# Patient Record
Sex: FEMALE | Race: WHITE | HISPANIC OR LATINO | Employment: OTHER | URBAN - METROPOLITAN AREA
[De-identification: names, ages, dates, MRNs, and addresses within clinical notes are randomized per-mention and may not be internally consistent; named-entity substitution may affect disease eponyms.]

---

## 2019-12-02 ENCOUNTER — OFFICE VISIT CONVERTED (OUTPATIENT)
Dept: FAMILY MEDICINE CLINIC | Facility: CLINIC | Age: 54
End: 2019-12-02
Attending: NURSE PRACTITIONER

## 2019-12-02 ENCOUNTER — HOSPITAL ENCOUNTER (OUTPATIENT)
Dept: LAB | Facility: HOSPITAL | Age: 54
Discharge: HOME | End: 2019-12-02
Attending: NURSE PRACTITIONER

## 2019-12-02 ENCOUNTER — CONVERSION ENCOUNTER (OUTPATIENT)
Dept: FAMILY MEDICINE CLINIC | Facility: CLINIC | Age: 54
End: 2019-12-02

## 2019-12-02 LAB
EST. AVERAGE GLUCOSE BLD GHB EST-MCNC: 128 MG/DL
HBA1C MFR BLD: 6.1 % (ref 3.5–5.7)
VIT B12 SERPL-MCNC: 1150 PG/ML (ref 211–911)

## 2019-12-03 LAB
25(OH)D3 SERPL-MCNC: 43.1 NG/ML (ref 30–100)
ALBUMIN SERPL-MCNC: 4.9 G/DL (ref 3.5–5)
ALBUMIN/GLOB SERPL: 1.6 {RATIO} (ref 1.4–2.6)
ALP SERPL-CCNC: 98 U/L (ref 53–141)
ALT SERPL-CCNC: 27 U/L (ref 10–40)
ANION GAP SERPL CALC-SCNC: 19 MMOL/L (ref 8–19)
AST SERPL-CCNC: 26 U/L (ref 15–50)
BASOPHILS # BLD AUTO: 0.04 10*3/UL (ref 0–0.2)
BASOPHILS NFR BLD AUTO: 0.6 % (ref 0–3)
BILIRUB SERPL-MCNC: 0.33 MG/DL (ref 0.2–1.3)
BUN SERPL-MCNC: 14 MG/DL (ref 5–25)
BUN/CREAT SERPL: 16 {RATIO} (ref 6–20)
CALCIUM SERPL-MCNC: 10.1 MG/DL (ref 8.7–10.4)
CANCER AG125 SERPL-ACNC: 6.2 U/ML (ref 0–38.1)
CHLORIDE SERPL-SCNC: 100 MMOL/L (ref 99–111)
CHOLEST SERPL-MCNC: 191 MG/DL (ref 107–200)
CHOLEST/HDLC SERPL: 2.9 {RATIO} (ref 3–6)
CONV ABS IMM GRAN: 0.01 10*3/UL (ref 0–0.2)
CONV CO2: 26 MMOL/L (ref 22–32)
CONV IMMATURE GRAN: 0.1 % (ref 0–1.8)
CONV TOTAL PROTEIN: 8 G/DL (ref 6.3–8.2)
CREAT UR-MCNC: 0.88 MG/DL (ref 0.5–0.9)
DEPRECATED RDW RBC AUTO: 43.3 FL (ref 36.4–46.3)
EOSINOPHIL # BLD AUTO: 0.27 10*3/UL (ref 0–0.7)
EOSINOPHIL # BLD AUTO: 3.9 % (ref 0–7)
ERYTHROCYTE [DISTWIDTH] IN BLOOD BY AUTOMATED COUNT: 13.1 % (ref 11.7–14.4)
GFR SERPLBLD BASED ON 1.73 SQ M-ARVRAT: >60 ML/MIN/{1.73_M2}
GLOBULIN UR ELPH-MCNC: 3.1 G/DL (ref 2–3.5)
GLUCOSE SERPL-MCNC: 90 MG/DL (ref 65–99)
HCT VFR BLD AUTO: 41.9 % (ref 37–47)
HDLC SERPL-MCNC: 65 MG/DL (ref 40–60)
HGB BLD-MCNC: 13.4 G/DL (ref 12–16)
LDLC SERPL CALC-MCNC: 107 MG/DL (ref 70–100)
LYMPHOCYTES # BLD AUTO: 1.72 10*3/UL (ref 1–5)
LYMPHOCYTES NFR BLD AUTO: 25 % (ref 20–45)
MCH RBC QN AUTO: 28.5 PG (ref 27–31)
MCHC RBC AUTO-ENTMCNC: 32 G/DL (ref 33–37)
MCV RBC AUTO: 89.1 FL (ref 81–99)
MONOCYTES # BLD AUTO: 0.55 10*3/UL (ref 0.2–1.2)
MONOCYTES NFR BLD AUTO: 8 % (ref 3–10)
NEUTROPHILS # BLD AUTO: 4.29 10*3/UL (ref 2–8)
NEUTROPHILS NFR BLD AUTO: 62.4 % (ref 30–85)
NRBC CBCN: 0 % (ref 0–0.7)
OSMOLALITY SERPL CALC.SUM OF ELEC: 292 MOSM/KG (ref 273–304)
PLATELET # BLD AUTO: 331 10*3/UL (ref 130–400)
PMV BLD AUTO: 10.5 FL (ref 9.4–12.3)
POTASSIUM SERPL-SCNC: 3.7 MMOL/L (ref 3.5–5.3)
RBC # BLD AUTO: 4.7 10*6/UL (ref 4.2–5.4)
SODIUM SERPL-SCNC: 141 MMOL/L (ref 135–147)
TRIGL SERPL-MCNC: 93 MG/DL (ref 40–150)
TSH SERPL-ACNC: 4.72 M[IU]/L (ref 0.27–4.2)
VLDLC SERPL-MCNC: 19 MG/DL (ref 5–37)
WBC # BLD AUTO: 6.88 10*3/UL (ref 4.8–10.8)

## 2019-12-04 ENCOUNTER — HOSPITAL ENCOUNTER (OUTPATIENT)
Dept: GENERAL RADIOLOGY | Facility: HOSPITAL | Age: 54
Discharge: HOME | End: 2019-12-04
Attending: NURSE PRACTITIONER

## 2019-12-20 ENCOUNTER — OFFICE VISIT CONVERTED (OUTPATIENT)
Dept: ORTHOPEDIC SURGERY | Facility: CLINIC | Age: 54
End: 2019-12-20
Attending: ORTHOPAEDIC SURGERY

## 2020-01-06 ENCOUNTER — CONVERSION ENCOUNTER (OUTPATIENT)
Dept: FAMILY MEDICINE CLINIC | Facility: CLINIC | Age: 55
End: 2020-01-06

## 2020-01-06 ENCOUNTER — OFFICE VISIT CONVERTED (OUTPATIENT)
Dept: FAMILY MEDICINE CLINIC | Facility: CLINIC | Age: 55
End: 2020-01-06
Attending: NURSE PRACTITIONER

## 2020-01-10 ENCOUNTER — HOSPITAL ENCOUNTER (OUTPATIENT)
Dept: FAMILY MEDICINE CLINIC | Facility: CLINIC | Age: 55
Discharge: HOME | End: 2020-01-10
Attending: NURSE PRACTITIONER

## 2020-01-10 ENCOUNTER — HOSPITAL ENCOUNTER (OUTPATIENT)
Dept: GENERAL RADIOLOGY | Facility: HOSPITAL | Age: 55
Discharge: HOME | End: 2020-01-10
Attending: NURSE PRACTITIONER

## 2020-01-10 ENCOUNTER — OFFICE VISIT CONVERTED (OUTPATIENT)
Dept: FAMILY MEDICINE CLINIC | Facility: CLINIC | Age: 55
End: 2020-01-10
Attending: NURSE PRACTITIONER

## 2020-01-15 LAB
CONV LAST MENSTURAL PERIOD: NORMAL
PATHOLOGIST REVIEW: NORMAL
SPECIMEN SOURCE: NORMAL
SPECIMEN SOURCE: NORMAL
THIN PREP CVX: NORMAL

## 2020-01-20 LAB — HPV HYBRID CAPTURE HIGH RISK: NEGATIVE

## 2020-01-23 ENCOUNTER — HOSPITAL ENCOUNTER (OUTPATIENT)
Dept: PERIOP | Facility: HOSPITAL | Age: 55
Setting detail: HOSPITAL OUTPATIENT SURGERY
Discharge: HOME | End: 2020-01-23
Attending: ORTHOPAEDIC SURGERY

## 2020-02-05 ENCOUNTER — CONVERSION ENCOUNTER (OUTPATIENT)
Dept: ORTHOPEDIC SURGERY | Facility: CLINIC | Age: 55
End: 2020-02-05

## 2020-02-05 ENCOUNTER — OFFICE VISIT CONVERTED (OUTPATIENT)
Dept: ORTHOPEDIC SURGERY | Facility: CLINIC | Age: 55
End: 2020-02-05
Attending: PHYSICIAN ASSISTANT

## 2020-02-06 ENCOUNTER — OFFICE VISIT CONVERTED (OUTPATIENT)
Dept: FAMILY MEDICINE CLINIC | Facility: CLINIC | Age: 55
End: 2020-02-06
Attending: NURSE PRACTITIONER

## 2020-02-06 ENCOUNTER — HOSPITAL ENCOUNTER (OUTPATIENT)
Dept: LAB | Facility: HOSPITAL | Age: 55
Discharge: HOME | End: 2020-02-06
Attending: NURSE PRACTITIONER

## 2020-02-06 ENCOUNTER — CONVERSION ENCOUNTER (OUTPATIENT)
Dept: FAMILY MEDICINE CLINIC | Facility: CLINIC | Age: 55
End: 2020-02-06

## 2020-02-06 ENCOUNTER — HOSPITAL ENCOUNTER (OUTPATIENT)
Dept: GENERAL RADIOLOGY | Facility: HOSPITAL | Age: 55
Discharge: HOME | End: 2020-02-06
Attending: NURSE PRACTITIONER

## 2020-02-06 LAB — TSH SERPL-ACNC: 6.1 M[IU]/L (ref 0.27–4.2)

## 2020-02-27 ENCOUNTER — HOSPITAL ENCOUNTER (OUTPATIENT)
Dept: OTHER | Facility: HOSPITAL | Age: 55
Discharge: HOME | End: 2020-02-27
Attending: OBSTETRICS & GYNECOLOGY

## 2020-03-04 ENCOUNTER — CONVERSION ENCOUNTER (OUTPATIENT)
Dept: ORTHOPEDIC SURGERY | Facility: CLINIC | Age: 55
End: 2020-03-04

## 2020-03-04 ENCOUNTER — OFFICE VISIT CONVERTED (OUTPATIENT)
Dept: ORTHOPEDIC SURGERY | Facility: CLINIC | Age: 55
End: 2020-03-04
Attending: PHYSICIAN ASSISTANT

## 2020-06-19 ENCOUNTER — HOSPITAL ENCOUNTER (OUTPATIENT)
Dept: LAB | Facility: HOSPITAL | Age: 55
Discharge: HOME | End: 2020-06-19
Attending: NURSE PRACTITIONER

## 2020-06-19 ENCOUNTER — OFFICE VISIT CONVERTED (OUTPATIENT)
Dept: FAMILY MEDICINE CLINIC | Facility: CLINIC | Age: 55
End: 2020-06-19
Attending: NURSE PRACTITIONER

## 2020-06-19 ENCOUNTER — CONVERSION ENCOUNTER (OUTPATIENT)
Dept: FAMILY MEDICINE CLINIC | Facility: CLINIC | Age: 55
End: 2020-06-19

## 2020-06-19 LAB — TSH SERPL-ACNC: 2.75 M[IU]/L (ref 0.27–4.2)

## 2020-07-31 ENCOUNTER — OFFICE VISIT CONVERTED (OUTPATIENT)
Dept: FAMILY MEDICINE CLINIC | Facility: CLINIC | Age: 55
End: 2020-07-31
Attending: NURSE PRACTITIONER

## 2020-07-31 ENCOUNTER — CONVERSION ENCOUNTER (OUTPATIENT)
Dept: FAMILY MEDICINE CLINIC | Facility: CLINIC | Age: 55
End: 2020-07-31

## 2020-11-02 ENCOUNTER — OFFICE VISIT CONVERTED (OUTPATIENT)
Dept: FAMILY MEDICINE CLINIC | Facility: CLINIC | Age: 55
End: 2020-11-02
Attending: NURSE PRACTITIONER

## 2020-12-02 ENCOUNTER — HOSPITAL ENCOUNTER (OUTPATIENT)
Dept: MRI IMAGING | Facility: HOSPITAL | Age: 55
Discharge: HOME | End: 2020-12-02
Attending: NURSE PRACTITIONER

## 2021-02-03 ENCOUNTER — OFFICE VISIT CONVERTED (OUTPATIENT)
Dept: FAMILY MEDICINE CLINIC | Facility: CLINIC | Age: 56
End: 2021-02-03
Attending: NURSE PRACTITIONER

## 2021-04-08 ENCOUNTER — HOSPITAL ENCOUNTER (OUTPATIENT)
Dept: VACCINE CLINIC | Facility: HOSPITAL | Age: 56
Discharge: HOME | End: 2021-04-08
Attending: INTERNAL MEDICINE

## 2021-04-08 ENCOUNTER — HOSPITAL ENCOUNTER (OUTPATIENT)
Dept: LAB | Facility: HOSPITAL | Age: 56
Discharge: HOME | End: 2021-04-08
Attending: NURSE PRACTITIONER

## 2021-04-08 LAB
25(OH)D3 SERPL-MCNC: 35.2 NG/ML (ref 30–100)
ALBUMIN SERPL-MCNC: 4.4 G/DL (ref 3.5–5)
ALBUMIN/GLOB SERPL: 1.5 {RATIO} (ref 1.4–2.6)
ALP SERPL-CCNC: 104 U/L (ref 53–141)
ALT SERPL-CCNC: 21 U/L (ref 10–40)
ANION GAP SERPL CALC-SCNC: 14 MMOL/L (ref 8–19)
AST SERPL-CCNC: 19 U/L (ref 15–50)
BASOPHILS # BLD AUTO: 0.05 10*3/UL (ref 0–0.2)
BASOPHILS NFR BLD AUTO: 0.8 % (ref 0–3)
BILIRUB SERPL-MCNC: 0.31 MG/DL (ref 0.2–1.3)
BUN SERPL-MCNC: 16 MG/DL (ref 5–25)
BUN/CREAT SERPL: 18 {RATIO} (ref 6–20)
CALCIUM SERPL-MCNC: 9.3 MG/DL (ref 8.7–10.4)
CHLORIDE SERPL-SCNC: 105 MMOL/L (ref 99–111)
CHOLEST SERPL-MCNC: 202 MG/DL (ref 107–200)
CHOLEST/HDLC SERPL: 2.7 {RATIO} (ref 3–6)
CONV ABS IMM GRAN: 0 10*3/UL (ref 0–0.2)
CONV CO2: 27 MMOL/L (ref 22–32)
CONV IMMATURE GRAN: 0 % (ref 0–1.8)
CONV TOTAL PROTEIN: 7.3 G/DL (ref 6.3–8.2)
CREAT UR-MCNC: 0.91 MG/DL (ref 0.5–0.9)
DEPRECATED RDW RBC AUTO: 42.8 FL (ref 36.4–46.3)
EOSINOPHIL # BLD AUTO: 0.43 10*3/UL (ref 0–0.7)
EOSINOPHIL # BLD AUTO: 6.8 % (ref 0–7)
ERYTHROCYTE [DISTWIDTH] IN BLOOD BY AUTOMATED COUNT: 13.2 % (ref 11.7–14.4)
GFR SERPLBLD BASED ON 1.73 SQ M-ARVRAT: >60 ML/MIN/{1.73_M2}
GLOBULIN UR ELPH-MCNC: 2.9 G/DL (ref 2–3.5)
GLUCOSE SERPL-MCNC: 102 MG/DL (ref 65–99)
HCT VFR BLD AUTO: 42.9 % (ref 37–47)
HDLC SERPL-MCNC: 75 MG/DL (ref 40–60)
HGB BLD-MCNC: 13.6 G/DL (ref 12–16)
LDLC SERPL CALC-MCNC: 109 MG/DL (ref 70–100)
LYMPHOCYTES # BLD AUTO: 1.41 10*3/UL (ref 1–5)
LYMPHOCYTES NFR BLD AUTO: 22.2 % (ref 20–45)
MCH RBC QN AUTO: 27.7 PG (ref 27–31)
MCHC RBC AUTO-ENTMCNC: 31.7 G/DL (ref 33–37)
MCV RBC AUTO: 87.4 FL (ref 81–99)
MONOCYTES # BLD AUTO: 0.35 10*3/UL (ref 0.2–1.2)
MONOCYTES NFR BLD AUTO: 5.5 % (ref 3–10)
NEUTROPHILS # BLD AUTO: 4.1 10*3/UL (ref 2–8)
NEUTROPHILS NFR BLD AUTO: 64.7 % (ref 30–85)
NRBC CBCN: 0 % (ref 0–0.7)
OSMOLALITY SERPL CALC.SUM OF ELEC: 295 MOSM/KG (ref 273–304)
PLATELET # BLD AUTO: 335 10*3/UL (ref 130–400)
PMV BLD AUTO: 10.2 FL (ref 9.4–12.3)
POTASSIUM SERPL-SCNC: 4 MMOL/L (ref 3.5–5.3)
RBC # BLD AUTO: 4.91 10*6/UL (ref 4.2–5.4)
SODIUM SERPL-SCNC: 142 MMOL/L (ref 135–147)
TRIGL SERPL-MCNC: 88 MG/DL (ref 40–150)
TSH SERPL-ACNC: 5.55 M[IU]/L (ref 0.27–4.2)
VIT B12 SERPL-MCNC: 1282 PG/ML (ref 211–911)
VLDLC SERPL-MCNC: 18 MG/DL (ref 5–37)
WBC # BLD AUTO: 6.34 10*3/UL (ref 4.8–10.8)

## 2021-04-29 ENCOUNTER — HOSPITAL ENCOUNTER (OUTPATIENT)
Dept: VACCINE CLINIC | Facility: HOSPITAL | Age: 56
Discharge: HOME | End: 2021-04-29
Attending: INTERNAL MEDICINE

## 2021-05-13 NOTE — PROGRESS NOTES
Progress Note      Patient Name: Dorina Basilio   Patient ID: [id number]   Sex: Female   YOB: 1965    Primary Care Provider: Rebecca SANDERS   Referring Provider: Rebecca SANDERS    Visit Date: July 31, 2020    Provider: MARILYN Santillan   Location: [facility]   Location Address: [address] [place] [address], [address]  [place], [place]  [id number]   Location Phone: [phone]          Chief Complaint  · 1 MONTH F/U      History Of Present Illness  Dorina Basilio is a 55 year old /White,  or  female who presents for evaluation and treatment of:      Patient is here today for 1 month follow up on migraine and depression.    Says Aimovig have been working for her migraines. She tried Ubrelvy and it didn't work-she thought the first time it did work but the next time it did not work. Admits throbbing in the left temporal upon waking. Admits light sensitivity and nausea. She is down to one cup of coffee.    depression-reports her mood is stable. Says Trintellix make her itchy but it works and she trying to see if itching will go away.    allergies-she has been off her allegra for awhile. She is taking otc Flonase and Claritin as needed.    reports she is going to start exercising and would like to go back to work.    [id number] at [facility]    hx of kidney stone 2008-she was given a strainer but never saw any stone. States the pain dwindled away. Admits the pain is back. Admits pain radiated on the left side. States she has been drinking cranberry juice for the last couple months. Denies urinary urgency or burning. Admits freq but admits she drinks a lot of fluid.       Past Medical History  Disease Name Date Onset Notes   Bladder Disorder --  --    Bone Density Screening NEVER --    Pap smear for cervical cancer screening 11/2018 NORMAL   Reflux --  --    Screening Mammogram 12/2018 NORMAL   Seasonal allergies --  --    Thyroid disorder --  --          Past  Surgical History  Procedure Name Date Notes   Ablation 2008 --    Carpal tunnel surgery 2016 --    Chromoendoscopy of upper or lower gastrointestinal tract 1992 --    Colonoscopy 2016 NORMAL   Cyst Removal 1979 throat   Gallbladder --  --    Gallbladder removal 2003 --    Ganglion cyst excision 2013 --    Tonsilectomy 1969 --    Tonsillectomy --  --    Tooth extraction, surgical 1978 --    Trigger Finger 2016 --    Tubal ligation 2004 --    Tuboplasty 1995 --    umbilical hernia repair 2008 --    Vulva surgery 1997 --          Medication List  Name Date Started Instructions   Aimovig Autoinjector 140 mg/mL subcutaneous auto-injector 07/31/2020 inject 140 mg by subcutaneous route once a month in the abdomen, thigh, or outer area of upper arm for 90 days   azelastine 0.05 % ophthalmic (eye) drops 06/19/2020 instill 1 drop into affected eye(s) by ophthalmic route 2 times per day   clindamycin phosphate/benzoyl perox 1.2 %/5% 06/19/2020 apply clindamycin phosphate/benzoyl peroxide bid   folic acid 400 mcg oral tablet 12/09/2019 take 1 tablet (0.4 mg) by oral route once daily   omeprazole 20 mg oral capsule,delayed release(DR/EC) 07/28/2020 TAKE 1 CAPSULE(20 MG) BY MOUTH EVERY DAY BEFORE A MEAL   Trintellix 10 mg oral tablet 07/21/2020 TAKE 1 TABLET(10 MG) BY MOUTH AT THE SAME TIME EVERY DAY         Allergy List  Allergen Name Date Reaction Notes   Feldene --  --  --          Family Medical History  Disease Name Relative/Age Notes   Stomach Neoplasm, Malignant Uncle/   --    Cancer, Unspecified Mother/   Mother   Diabetes, unspecified type Brother/  Mother/  Sister/   Mother; Sister; Brother   Ovarian Cancer, Family History Mother/   --    Diabetes Aunt/  Brother/  Grandfather (maternal)/  Grandmother (maternal)/  Mother/  Sister/  Uncle/   --    Osteoporosis Mother/   Mother   Family history of Arthritis Brother/  Mother/   Mother; Brother         Social History  Finding Status Start/Stop Quantity Notes   Alcohol Use --  " --/-- --  07/31/2020 - 06/19/2020 - 02/06/2020 - 01/10/2020 - rarely drinks, less than 1 drink per day   lives with spouse --  --/-- --  --     --  --/-- --  --    Recreational Drug Use Never --/-- --  no   Retired. --  --/-- --  --    Tobacco Never --/-- --  never smoker         Immunizations  NameDate Admin Mfg Trade Name Lot Number Route Inj VIS Given VIS Publication   Hepatitis A09/14/1998 NE Not Entered  NE NE 12/02/2019    Comments:    Hepatitis A03/12/1998 NE Not Entered  NE NE 12/02/2019    Comments:    Fbjhvkkdp15/02/2019 [facility] Fluzone Quadrivalent [id number] IM RD 12/02/2019    Comments: PATIENT TOLERATED WELL.   Hpnacpdo73/14/2014 NE Not Entered  NE NE 12/02/2019    Comments:    Tdap12/02/2019 [facility] BOOSTRIX [id number] IM LD 12/02/2019    Comments: PATIENT TOLERATED WELL.         Review of Systems  · Constitutional  o Denies  o : fever, fatigue, weight loss, weight gain  · HENT  o Admits  o : headaches  · Cardiovascular  o Denies  o : lower extremity edema, claudication, chest pressure, palpitations  · Respiratory  o Denies  o : shortness of breath, wheezing, cough, hemoptysis, dyspnea on exertion  · Gastrointestinal  o Denies  o : nausea, vomiting, diarrhea, constipation, abdominal pain  · Genitourinary  o Admits  o : frequency  o Denies  o : urgency, dysuria  · Musculoskeletal  o Admits  o : back pain      Vitals  Date Time BP Position Site L\R Cuff Size HR RR TEMP (F) WT  HT  BMI kg/m2 BSA m2 O2 Sat        07/31/2020 02:19 /69 Sitting    70 - R   188lbs 0oz 5'  2\" 34.39 1.93 98 %          Physical Examination  · Constitutional  o Appearance  o : alert, in no acute distress, well developed, well-nourished  · Neck  o Thyroid  o : no thyomegaly, no palpabale masses   · Respiratory  o Auscultation of Lungs  o : normal breath sounds throughout, no wheeze, rhonchi, or crackles  · Cardiovascular  o Heart  o : Regular rate and rhythm, Normal S1,S2 , No cardiac murmers, No S3 or S4 gallop or rubs  · Skin " and Subcutaneous Tissue  o General Inspection  o : no rashes, normal skin color, warm and dry  o Digits and Nails  o : no clubbing, cyanosis, deformities or edema present, normal appearing nails  · Neurologic  o Mental Status Examination  o : alert and oriented to time, place, and person. Gait and Station: normal gait, able to stand without difficulty  · Psychiatric  o Judgement and Insight  o : judgment and insight intact  o Mood and Affect  o : normal mood and affect  · Back  o Inspection  o : no gross deformities  o Palpation  o : no tenderness to palpation, no swelling  o Range of Motion  o : normal range of motion  o Reflexes  o : normal reflexes  o Gait  o : normal gait  o Special Tests  o : negative straight leg raise              Assessment  · Depression     311/F32.9  mood is stable on Trintellix. Complaint of side effect of pruritus but report is manageable and wishes to continue taking. Refill today.  · Migraines     346.90/G43.909  Admits decreased migraine since starting Aimovig. Admits throbbing in the left temporal upon waking. Admits light sensitivity and nausea. She has decreased her caffeine intake. Prescribed Nurtec for abortive therapy.   · Trigger finger, right     727.03/M65.30  right middle finger-referred to ortho.  · Seasonal allergies     477.9/J30.2  taking otc Flonase and Claritin as needed.  · History of kidney stones     V13.01/Z87.442  hx of kidney stone 2008-states the pain resolved on it own-she never passes a stone. Th e same symptoms have returned. Admits left sided radiating pain to the groin. States she has been drinking cranberry juice for the last couple months. Denies urinary urgency or burning. Admits freq but unchanged. Ordered U/A today.      Plan  · Orders  o ACO-39: Current medications updated and reviewed () - - 07/31/2020  o ACO-14: Influenza immunization administered or previously received () - - 07/31/2020  o ORTHOPEDIC CONSULTATION (ORTHO) - 727.03/M65.30 -  07/31/2020  · Medications  o Nurtec ODT 75 mg oral tablet,disintegrating   SIG: take 1 tablet (75 mg) and place on top of tongue, allow to dissolve then swallow by oral route once as needed for migraine; max 1 dose/24 hrs   DISP: (1) Box with 0 refills  Prescribed on 08/09/2020     o Aimovig Autoinjector 140 mg/mL subcutaneous auto-injector   SIG: inject 140 mg by subcutaneous route once a month in the abdomen, thigh, or outer area of upper arm for 90 days   DISP: (3) Auto-injectors with 2 refills  Adjusted on 07/31/2020     o Medications have been Reconciled  o Transition of Care or Provider Policy  · Instructions  o Take all medications as prescribed/directed.  o Patient was educated/instructed on their diagnosis, treatment and medications prior to discharge from the clinic today.  o Patient instructed to seek medical attention urgently for new or worsening symptoms.  o Call the office with any concerns or questions.  o Discussed Covid-19 precautions including, but not limited to, social distancing, avoid touching your face, and hand washing.   o 3 month follow up  · Disposition  o Call or Return if symptoms worsen or persist.            Electronically Signed by: MARILYN Santillan -Author on August 17, 2020 12:17:20 AM

## 2021-05-13 NOTE — PROGRESS NOTES
Progress Note      Patient Name: Dorina Basilio   Patient ID: [id number]   Sex: Female   YOB: 1965    Primary Care Provider: Rebecca SANDERS   Referring Provider: Rebecca SANDERS    Visit Date: November 2, 2020    Provider: MARILYN Santillan   Location: [facility]   Location Address: [address] [place] [address], [address]  [place], [place]  [id number]   Location Phone: [phone]          Chief Complaint  · 3 MONTHS F/U      History Of Present Illness  Dorina Basilio is a 55 year old /White,  or  female who presents for evaluation and treatment of:      Patient is here today for 3 months follow up.    Says she still has migraines every day. Wake up with the headache on one side of head and sometimes it is go away and most times it don't. Reports the pain is on the left side. Denies blurred vision normally but yesterday she did have light sensitivity, n/v and blurred vision yesterday w/migraine. She cut back her caffeine and is watching sodium, msg She took her aimovig on Halloween. Admits since starting the Aimovig her migraines are not as intense. She has had them for over 20 yrs-her  quit smoking which has helped because the cigarette smoke was a trigger. States the Nurtec has helped since starting-she states it worked within 2 hours.    depression-reports doing better on Trintellix-c/o itching initially but it has resolved.            Past Medical History  Disease Name Date Onset Notes   Bladder Disorder --  --    Bone Density Screening NEVER --    Pap smear for cervical cancer screening 01/2020 NORMAL   Reflux --  --    Screening Mammogram 01/2020 NORMAL   Seasonal allergies --  --    Thyroid disorder --  --          Past Surgical History  Procedure Name Date Notes   Ablation 2008 --    Carpal tunnel surgery 2016 --    Chromoendoscopy of upper or lower gastrointestinal tract 1992 --    Colonoscopy 2016 NORMAL   Cyst Removal 1979 throat    Gallbladder --  --    Gallbladder removal 2003 --    Ganglion cyst excision 2013 --    Tonsilectomy 1969 --    Tonsillectomy --  --    Tooth extraction, surgical 1978 --    Trigger Finger 2016 --    Tubal ligation 2004 --    Tuboplasty 1995 --    umbilical hernia repair 2008 --    Vulva surgery 1997 --          Medication List  Name Date Started Instructions   Aimovig Autoinjector 140 mg/mL subcutaneous auto-injector 11/02/2020 INJECT 140 MG UNDER THE SKIN ONCE A MONTH IN THE ABDOMEN, THIGH OR OUTER AREA OF UPPER ARM   azelastine 0.05 % ophthalmic (eye) drops 10/05/2020 INSTILL 1 DROP IN AFFECTED EYE(S) TWICE DAILY   clindamycin phosphate/benzoyl perox 1.2 %/5% 06/19/2020 apply clindamycin phosphate/benzoyl peroxide bid   folic acid 400 mcg oral tablet 12/09/2019 take 1 tablet (0.4 mg) by oral route once daily   Nurtec ODT 75 mg oral tablet,disintegrating 11/02/2020 take 1 tab(75 mg) on top of tongue, allow to dissolve then swallow PO once as needed for migraine; max 1 dose/24 hrs   omeprazole 20 mg oral capsule,delayed release(DR/EC) 07/28/2020 TAKE 1 CAPSULE(20 MG) BY MOUTH EVERY DAY BEFORE A MEAL   Trintellix 10 mg oral tablet 10/05/2020 TAKE 1 TABLET(10 MG) BY MOUTH AT THE SAME TIME EVERY DAY         Allergy List  Allergen Name Date Reaction Notes   Feldene --  --  --        Allergies Reconciled  Family Medical History  Disease Name Relative/Age Notes   Stomach Neoplasm, Malignant Uncle/   --    Cancer, Unspecified Mother/   Mother   Diabetes, unspecified type Brother/  Mother/  Sister/   Mother; Sister; Brother   Ovarian Cancer, Family History Mother/   --    Diabetes Aunt/  Brother/  Grandfather (maternal)/  Grandmother (maternal)/  Mother/  Sister/  Uncle/   --    Osteoporosis Mother/   Mother   Family history of Arthritis Brother/  Mother/   Mother; Brother         Social History  Finding Status Start/Stop Quantity Notes   Alcohol Use --  --/-- --  11/05/2020 - 11/02/2020 - 07/31/2020 - 06/19/2020 -  "02/06/2020 - 01/10/2020 - rarely drinks, less than 1 drink per day   lives with spouse --  --/-- --  --     --  --/-- --  --    Recreational Drug Use Never --/-- --  no   Retired. --  --/-- --  --    Tobacco Never --/-- --  never smoker         Immunizations  NameDate Admin Mfg Trade Name Lot Number Route Inj VIS Given VIS Publication   Hepatitis A09/14/1998 NE Not Entered  NE NE 12/02/2019    Comments:    Hepatitis A03/12/1998 NE Not Entered  NE NE 12/02/2019    Comments:    Jppddmgeb34/23/2020 [facility] Fluzone Quadrivalent [id number] IM LD 10/23/2020 08/15/2019   Comments: PATIENT TOLERATED WELL   Lowodwnr35/14/2014 NE Not Entered  NE NE 12/02/2019    Comments:    Tdap12/02/2019 [facility] BOOSTRIX [id number] IM LD 12/02/2019    Comments: PATIENT TOLERATED WELL.         Review of Systems  · Constitutional  o Denies  o : fever, fatigue, weight loss, weight gain  · HENT  o Admits  o : headaches  · Cardiovascular  o Denies  o : lower extremity edema, claudication, chest pressure, palpitations  · Respiratory  o Denies  o : shortness of breath, wheezing, cough, hemoptysis, dyspnea on exertion  · Gastrointestinal  o Denies  o : nausea, vomiting, diarrhea, constipation, abdominal pain  · Psychiatric  o Admits  o : depression      Vitals  Date Time BP Position Site L\R Cuff Size HR RR TEMP (F) WT  HT  BMI kg/m2 BSA m2 O2 Sat FR L/min FiO2        11/02/2020 01:52 /59 Sitting    72 - R   186lbs 4oz 5'  2\" 34.07 1.92 98 %            Physical Examination  · Constitutional  o Appearance  o : alert, in no acute distress, well developed, well-nourished  · Neck  o Thyroid  o : no thyomegaly, no palpabale masses   · Respiratory  o Auscultation of Lungs  o : normal breath sounds throughout, no wheeze, rhonchi, or crackles  · Cardiovascular  o Heart  o : Regular rate and rhythm, Normal S1,S2 , No cardiac murmers, No S3 or S4 gallop or rubs  · Skin and Subcutaneous Tissue  o General Inspection  o : no rashes, normal skin color, warm and " dry  o Digits and Nails  o : no clubbing, cyanosis, deformities or edema present, normal appearing nails  · Neurologic  o Mental Status Examination  o : alert and oriented to time, place, and person. Gait and Station: normal gait, able to stand without difficulty  · Psychiatric  o Judgement and Insight  o : judgment and insight intact  o Mood and Affect  o : normal mood and affect          Assessment  · Depression     311/F32.9  · Migraines     346.90/G43.909  c/o daily migraines-uncontrolled on current regimen. She request MRI brain-ordered today      Plan  · Orders  o ACO-39: Current medications updated and reviewed (1159F, ) - - 11/02/2020  o ACO-14: Influenza immunization administered or previously received [facility] () - - 11/02/2020  o MRI brain w/ contrast (30063) - - 11/02/2020  · Medications  o Flonase Allergy Relief 50 mcg/actuation nasal spray,suspension   SIG: spray 1 - 2 sprays (50 - 100 mcg) in each nostril by intranasal route once daily as needed for 90 days   DISP: (3) Puff with 2 refills  Prescribed on 11/02/2020     o Aimovig Autoinjector 140 mg/mL subcutaneous auto-injector   SIG: INJECT 140 MG UNDER THE SKIN ONCE A MONTH IN THE ABDOMEN, THIGH OR OUTER AREA OF UPPER ARM   DISP: (4) Pen Needle with 2 refills  Adjusted on 11/02/2020     o Nurtec ODT 75 mg oral tablet,disintegrating   SIG: take 1 tab(75 mg) on top of tongue, allow to dissolve then swallow PO once as needed for migraine; max 1 dose/24 hrs   DISP: (30) Box with 0 refills  Adjusted on 11/02/2020     o Medications have been Reconciled  o Transition of Care or Provider Policy  · Instructions  o Take all medications as prescribed/directed.  o Patient was educated/instructed on their diagnosis, treatment and medications prior to discharge from the clinic today.  o Patient instructed to seek medical attention urgently for new or worsening symptoms.  o Call the office with any concerns or questions.  o 3 month follow up  o Electronically  Identified Patient Education Materials Provided Electronically  · Disposition  o Call or Return if symptoms worsen or persist.            Electronically Signed by: MARILYN Santillan -Author on November 8, 2020 04:51:38 PM

## 2021-05-13 NOTE — PROGRESS NOTES
Progress Note      Patient Name: Dorina Basilio   Patient ID: [id number]   Sex: Female   YOB: 1965    Primary Care Provider: Rebecca SANDERS   Referring Provider: Rebecca SANDERS    Visit Date: June 19, 2020    Provider: MARILYN Santillan   Location: [facility]   Location Address: [address] [place] [address], [address]  [place], [place]  [id number]   Location Phone: [phone]          Chief Complaint  · MEDICATION F/U      History Of Present Illness  Dorina Basilio is a 54 year old /White,  or  female who presents for evaluation and treatment of:      Patient is here today for medication follow up.    Stopped taking all her medications due to COVID19. Would like to discuss headaches, her GERD, and would like to get referral to Dr. López for trigger R middle finger.    migraines-reports she is still having daily migraines. She is taking aimovig 70mg she would like to try the higher dose.    GERD-she has noticed an increase in GERD symptoms-admits she is getting burning in her throat. Admits to taking tums freq.    trigger finger right middle finger. She has had surgery on the 4th digit for trigger finger and carpal tunnel release. She wears a finger splint, warms under water, and massage but it is not helping. She would like a referral to .    depression-reports she tried Effexor but it did not help her symptoms.    hypothyroid-reports she did not do her last tsh-she has not been taking her Synthroid.       Past Medical History  Disease Name Date Onset Notes   Bladder Disorder --  --    Bone Density Screening NEVER --    Pap smear for cervical cancer screening 11/2018 NORMAL   Reflux --  --    Screening Mammogram 12/2018 NORMAL   Seasonal allergies --  --    Thyroid disorder --  --          Past Surgical History  Procedure Name Date Notes   Ablation 2008 --    Carpal tunnel surgery 2016 --    Chromoendoscopy of upper or lower gastrointestinal tract 1992 --     Colonoscopy 2016 NORMAL   Cyst Removal 1979 throat   Gallbladder --  --    Gallbladder removal 2003 --    Ganglion cyst excision 2013 --    Tonsilectomy 1969 --    Tonsillectomy --  --    Tooth extraction, surgical 1978 --    Trigger Finger 2016 --    Tubal ligation 2004 --    Tuboplasty 1995 --    umbilical hernia repair 2008 --    Vulva surgery 1997 --          Medication List  Name Date Started Instructions   azelastine 0.05 % ophthalmic (eye) drops 06/19/2020 instill 1 drop into affected eye(s) by ophthalmic route 2 times per day   Effexor XR 75 mg oral capsule,extended release 24hr 03/30/2020 take 1 capsule (75 mg) by oral route once daily with food for 90 days   folic acid 400 mcg oral tablet 12/09/2019 take 1 tablet (0.4 mg) by oral route once daily         Allergy List  Allergen Name Date Reaction Notes   Feldene --  --  --        Allergies Reconciled  Family Medical History  Disease Name Relative/Age Notes   Stomach Neoplasm, Malignant Uncle/   --    Cancer, Unspecified Mother/   Mother   Diabetes, unspecified type Brother/  Mother/  Sister/   Mother; Sister; Brother   Ovarian Cancer, Family History Mother/   --    Diabetes Aunt/  Brother/  Grandfather (maternal)/  Grandmother (maternal)/  Mother/  Sister/  Uncle/   --    Osteoporosis Mother/   Mother   Family history of Arthritis Brother/  Mother/   Mother; Brother         Social History  Finding Status Start/Stop Quantity Notes   Alcohol Use --  --/-- --  06/19/2020 - 02/06/2020 - 01/10/2020 - rarely drinks, less than 1 drink per day   lives with spouse --  --/-- --  --     --  --/-- --  --    Recreational Drug Use Never --/-- --  no   Retired. --  --/-- --  --    Tobacco Never --/-- --  never smoker         Immunizations  NameDate Admin Mfg Trade Name Lot Number Route Inj VIS Given VIS Publication   Hepatitis A09/14/1998 NE Not Entered  NE NE 12/02/2019    Comments:    Hepatitis A03/12/1998 NE Not Entered  NE NE 12/02/2019    Comments:     Hqrimvdbr67/02/2019 [facility] Fluzone Quadrivalent [id number] IM RD 12/02/2019    Comments: PATIENT TOLERATED WELL.   Xnikeiqo07/14/2014 NE Not Entered  NE NE 12/02/2019    Comments:    Tdap12/02/2019 [facility] BOOSTRIX [id number] IM LD 12/02/2019    Comments: PATIENT TOLERATED WELL.         Review of Systems  · Constitutional  o Denies  o : fever, fatigue, weight loss, weight gain  · Eyes  o Admits  o : discharge from eye, itchy, watery  · HENT  o Admits  o : headaches  · Cardiovascular  o Denies  o : lower extremity edema, claudication, chest pressure, palpitations  · Respiratory  o Denies  o : shortness of breath, wheezing, cough, hemoptysis, dyspnea on exertion  · Gastrointestinal  o Admits  o : GERD  o Denies  o : nausea, vomiting, diarrhea, constipation, abdominal pain  · Musculoskeletal  o Admits  o : limitation of motion, right 3rd digit trigger finger  · Psychiatric  o Admits  o : depression      Vitals  Date Time BP Position Site L\R Cuff Size HR RR TEMP (F) WT  HT  BMI kg/m2 BSA m2 O2 Sat        06/19/2020 02:09 /64 Sitting    69 - R   188lbs 0oz    98 %          Physical Examination  · Constitutional  o Appearance  o : alert, in no acute distress, well developed, well-nourished  · Neck  o Thyroid  o : no thyomegaly, no palpabale masses   · Respiratory  o Auscultation of Lungs  o : normal breath sounds throughout, no wheeze, rhonchi, or crackles  · Cardiovascular  o Heart  o : Regular rate and rhythm, Normal S1,S2 , No cardiac murmers, No S3 or S4 gallop or rubs  · Skin and Subcutaneous Tissue  o General Inspection  o : no rashes, normal skin color, warm and dry  o Digits and Nails  o : no clubbing, cyanosis, deformities or edema present, normal appearing nails  · Neurologic  o Mental Status Examination  o : alert and oriented to time, place, and person. Gait and Station: normal gait, able to stand without difficulty  · Psychiatric  o Judgement and Insight  o : judgment and insight intact  o Mood and Affect  o :  normal mood and affect          Assessment  · Allergic rhinitis due to allergen     477.9/J30.9  c/o eyes itching and watering due to allergies-prescribed Pataday.  · Depression     311/F32.9  tried and failed Effexor-prescribed Trintellix 10mg qd.  · GERD (gastroesophageal reflux disease)     530.81/K21.9  GERD symptoms increased-Admits to taking tums freq. Ordered omeprazole 20mg qd.  · Hypothyroidism     244.9/E03.9  reports she did get her lab to check her tsh-she has not been taking her Synthroid. Ordered tsh today.  · Trigger finger of right hand     727.03/M65.30  c/o trigger finger right middle finger. Hx of trigger finger release 4th digit and carpal tunnel release. She wears a finger splint, warms under water, and massage but it is not helping. Referral placed for .  · Migraines     346.90/G43.909  migraines uncontrolled on current dose of Aimovig-increased to 140mg sub q monthly.      Plan  · Orders  o TSH [facility] ([address]47) - 244.9/E03.9 - 06/19/2020  o ACO-39: Current medications updated and reviewed () - - 06/19/2020  o ACO-14: Influenza immunization administered or previously received () - - 06/19/2020  o ORTHOPEDIC CONSULTATION (ORTHO) - 727.03/M65.30 - 06/19/2020  · Medications  o Pataday 0.2 % ophthalmic (eye) drops   SIG: instill 1 drop into affected eye(s) by ophthalmic route once daily   DISP: (1) 2.5 ml drop btl with 0 refills  Prescribed on 06/19/2020     o Aimovig Autoinjector 140 mg/mL subcutaneous auto-injector   SIG: inject 140 mg by subcutaneous route once a month in the abdomen, thigh, or outer area of upper arm   DISP: (1) Auto-injector with 0 refills  Prescribed on 06/19/2020     o omeprazole 20 mg oral capsule,delayed release(DR/EC)   SIG: take 1 capsule (20 mg) by oral route once daily before a meal   DISP: (30) capsules with 0 refills  Prescribed on 06/19/2020     o Trintellix 10 mg oral tablet   SIG: take 1 tablet (10 mg) by oral route once daily at the same time each day    DISP: (30) tablets with 0 refills  Prescribed on 06/19/2020     o clindamycin phosphate/benzoyl perox 1.2 %/5%   SIG: apply clindamycin phosphate/benzoyl peroxide bid   DISP: (1) 45g with 0 refills  Prescribed on 06/19/2020     o Aimovig Autoinjector 70 mg/mL subcutaneous auto-injector   SIG: inject 1 milliliter (70 mg) by subcutaneous route once a month in the abdomen, thigh, or outer area of upper arm   DISP: (1) Auto-injector with 0 refills  Discontinued on 06/19/2020     o Pataday 0.2 % ophthalmic (eye) drops   SIG: instill 1 drop into affected eye(s) by ophthalmic route once daily   DISP: (3) 2.5 ml drop btl with 1 refills  Discontinued on 06/19/2020     o Medications have been Reconciled  o Transition of Care or Provider Policy  · Instructions  o Maintain a healthy weight. Avoid tight fitting clothes. Avoid fried, fatty foods, tomato sauce, chocolate, mint, garlic, onion, alcohol. caffeine. Eat smaller meals, dont lie down after a meal, dont smoke. Elevate the head of your bed 6-9 inches.  o Take all medications as prescribed/directed.  o Patient was educated/instructed on their diagnosis, treatment and medications prior to discharge from the clinic today.  o Patient instructed to seek medical attention urgently for new or worsening symptoms.  o Call the office with any concerns or questions.  o 1 month follow up  · Disposition  o Call or Return if symptoms worsen or persist.            Electronically Signed by: MARILYN Santillan -Author on June 23, 2020 10:11:00 PM

## 2021-05-14 VITALS
SYSTOLIC BLOOD PRESSURE: 128 MMHG | HEART RATE: 78 BPM | DIASTOLIC BLOOD PRESSURE: 79 MMHG | WEIGHT: 190 LBS | HEIGHT: 62 IN | BODY MASS INDEX: 34.96 KG/M2 | OXYGEN SATURATION: 97 %

## 2021-05-14 VITALS
DIASTOLIC BLOOD PRESSURE: 59 MMHG | HEART RATE: 72 BPM | WEIGHT: 186.25 LBS | OXYGEN SATURATION: 98 % | SYSTOLIC BLOOD PRESSURE: 138 MMHG | BODY MASS INDEX: 34.27 KG/M2 | HEIGHT: 62 IN

## 2021-05-14 NOTE — PROGRESS NOTES
Progress Note      Patient Name: Dorina Basilio   Patient ID: [id number]   Sex: Female   YOB: 1965    Primary Care Provider: Rebecca SANDERS   Referring Provider: Rebecca SANDERS    Visit Date: February 3, 2021    Provider: MARILYN Santillan   Location: [facility]   Location Address: [address] [place] [address], [address]  [place], [place]  [id number]   Location Phone: [phone]          Chief Complaint  · 3 MONTHS F/U      History Of Present Illness  Dorina Basilio is a 55 year old /White,  or  female who presents for evaluation and treatment of:      Patient is here today for 3 months follow up.    Says she doing good would like RX for Claritin for her allergies says it works the best for her itchy eyes-prescribed.     she had an abnormal pap here referred to -she performed an endometrial bx-States she has follow up with Dr. Steinberg tomorrow since she had biopsy 6 months ago it was normal.    Would like  referral to Dermatology for facial acne-referral placed.     Would like to schedule mammogram-scheduled today.    depression-states Trintellix is working well for her depression symptoms.    migraines-stable w/Aimovig.       Past Medical History  Disease Name Date Onset Notes   Bladder Disorder --  --    Bone Density Screening NEVER --    Pap smear for cervical cancer screening 01/2020 NORMAL   Reflux --  --    Screening Mammogram 01/2020 NORMAL   Seasonal allergies --  --    Thyroid disorder --  --          Past Surgical History  Procedure Name Date Notes   Ablation 2008 --    Carpal tunnel surgery 2016 --    Chromoendoscopy of upper or lower gastrointestinal tract 1992 --    Colonoscopy 2016 NORMAL   Cyst Removal 1979 throat   Gallbladder --  --    Gallbladder removal 2003 --    Ganglion cyst excision 2013 --    Tonsilectomy 1969 --    Tonsillectomy --  --    Tooth extraction, surgical 1978 --    Trigger Finger 2016 --    Tubal ligation  2004 --    Tuboplasty 1995 --    umbilical hernia repair 2008 --    Vulva surgery 1997 --          Medication List  Name Date Started Instructions   Aimovig Autoinjector 140 mg/mL subcutaneous auto-injector 11/02/2020 INJECT 140 MG UNDER THE SKIN ONCE A MONTH IN THE ABDOMEN, THIGH OR OUTER AREA OF UPPER ARM   azelastine 0.05 % ophthalmic (eye) drops 10/05/2020 INSTILL 1 DROP IN AFFECTED EYE(S) TWICE DAILY   Claritin 10 mg oral tablet 02/03/2021 take 1 tablet (10 mg) by oral route once daily for 90 days   Flonase Allergy Relief 50 mcg/actuation nasal spray,suspension 11/02/2020 spray 1 - 2 sprays (50 - 100 mcg) in each nostril by intranasal route once daily as needed for 90 days   folic acid 400 mcg oral tablet 02/03/2021 take 1 tablet (0.4 mg) by oral route once daily for 90 days   Nurtec ODT 75 mg oral tablet,disintegrating 02/03/2021 take 1 tab(75 mg) on top of tongue, allow to dissolve then swallow PO once as needed for migraine; max 1 dose/24 hrs   OMEPRAZOLE 20MG CAPSULES 12/04/2020 TAKE 1 CAPSULE(20 MG) BY MOUTH EVERY DAY BEFORE A MEAL   Trintellix 10 mg oral tablet 10/05/2020 TAKE 1 TABLET(10 MG) BY MOUTH AT THE SAME TIME EVERY DAY         Allergy List  Allergen Name Date Reaction Notes   Feldene --  --  --          Family Medical History  Disease Name Relative/Age Notes   Stomach Neoplasm, Malignant Uncle/   --    Cancer, Unspecified Mother/   Mother   Diabetes, unspecified type Brother/  Mother/  Sister/   Mother; Sister; Brother   Ovarian Cancer, Family History Mother/   --    Diabetes Aunt/  Brother/  Grandfather (maternal)/  Grandmother (maternal)/  Mother/  Sister/  Uncle/   --    Osteoporosis Mother/   Mother   Family history of Arthritis Brother/  Mother/   Mother; Brother         Social History  Finding Status Start/Stop Quantity Notes   Alcohol Use --  --/-- --  02/03/2021 - 11/05/2020 - 11/02/2020 - 07/31/2020 - 06/19/2020 - 02/06/2020 - 01/10/2020 - rarely drinks, less than 1 drink per day   lives  "with spouse --  --/-- --  --     --  --/-- --  --    Recreational Drug Use Never --/-- --  no   Retired. --  --/-- --  --    Tobacco Never --/-- --  never smoker         Immunizations  NameDate Admin Mfg Trade Name Lot Number Route Inj VIS Given VIS Publication   Hepatitis A09/14/1998 NE Not Entered  NE NE 12/02/2019    Comments:    Hepatitis A03/12/1998 NE Not Entered  NE NE 12/02/2019    Comments:    Grupoimoz16/03/2021 [facility] Fluzone Quadrivalent [id number] IM RD 02/03/2021 08/15/2019   Comments:    Hertrsmp91/14/2014 NE Not Entered  NE NE 12/02/2019    Comments:    Tdap12/02/2019 [facility] BOOSTRIX [id number] IM LD 12/02/2019    Comments: PATIENT TOLERATED WELL.         Review of Systems  · Constitutional  o Denies  o : fever, fatigue, weight loss, weight gain  · Cardiovascular  o Denies  o : lower extremity edema, claudication, chest pressure, palpitations  · Respiratory  o Denies  o : shortness of breath, wheezing, cough, hemoptysis, dyspnea on exertion  · Gastrointestinal  o Denies  o : nausea, vomiting, diarrhea, constipation, abdominal pain  · Psychiatric  o Admits  o : depression  · Allergic-Immunologic  o Admits  o : seasonal alleriges  o Denies  o : frequent illnesses      Vitals  Date Time BP Position Site L\R Cuff Size HR RR TEMP (F) WT  HT  BMI kg/m2 BSA m2 O2 Sat FR L/min FiO2        02/03/2021 01:49 /79 Sitting    78 - R   190lbs 0oz 5'  2\" 34.75 1.94 97 %            Physical Examination  · Constitutional  o Appearance  o : alert, in no acute distress, well developed, well-nourished  · Neck  o Thyroid  o : no thyomegaly, no palpabale masses   · Respiratory  o Auscultation of Lungs  o : normal breath sounds throughout, no wheeze, rhonchi, or crackles  · Cardiovascular  o Heart  o : Regular rate and rhythm, Normal S1,S2 , No cardiac murmers, No S3 or S4 gallop or rubs  · Skin and Subcutaneous Tissue  o General Inspection  o : no rashes, normal skin color, warm and dry  o Digits and Nails  o : no " clubbing, cyanosis, deformities or edema present, normal appearing nails  · Neurologic  o Mental Status Examination  o : alert and oriented to time, place, and person. Gait and Station: normal gait, able to stand without difficulty  · Psychiatric  o Judgement and Insight  o : judgment and insight intact  o Mood and Affect  o : normal mood and affect          Assessment  · Allergic rhinitis due to allergen     477.9/J30.9  · Depression     311/F32.9  · Fatigue     780.79/R53.83  · GERD (gastroesophageal reflux disease)     530.81/K21.9  · Screening for depression     V79.0/Z13.89  · Need for influenza vaccination     V04.81/Z23  · Screening for lipid disorders     V77.91/Z13.220  · Visit for screening mammogram     V76.12/Z12.31  · Migraines     346.90/G43.909  · Acne     706.1/L70.9  · Screening for thyroid disorder     V77.0/Z13.29  · Routine lab draw     V72.60/Z01.89      Plan  · Orders  o Immunization Admin Fee (Single) ([facility]) (96942) - V04.81/Z23 - 02/03/2021  o Fluzone Quadrivalent Vaccine, age 6 months + (13225) - V04.81/Z23 - 02/03/2021   Vaccine - Influenza; Dose: 0.5; Site: Right Deltoid; Route: Intramuscular; Date: 02/03/2021 14:36:00; Exp: 06/30/2021; Lot: [id number]; Mfg: sanofi pasteur; TradeName: Fluzone Quadrivalent; Administered By: Betsy Godinez MA; Comment: N/A  o Screening Mammography; Bilateral 3D (91003, 70631, ) - V76.12/Z12.31 - 02/03/2021  o Physical, Primary Care Panel (CBC, CMP, Lipid, TSH) [facility] (34125, 58537, 78161, 13280) - V77.0/Z13.29, V77.91/Z13.220, 780.79/R53.83, V72.60/Z01.89 - 02/03/2021  o Vitamin D (25-Hydroxy) Level (87240) - 780.79/R53.83, V72.60/Z01.89 - 02/03/2021  o ACO-14: Influenza immunization administered or previously received [facility] () - - 02/03/2021  o ACO-19: Colorectal cancer screening results documented and reviewed (3017F) - - 01/01/2016  o ACO-39: Current medications updated and reviewed (1159F, ) - - 02/03/2021  o DERMATOLOGY CONSULTATION (DERMA) -  706.1/L70.9 - 02/03/2021  o Vitamin B12 level (31688) - 780.79/R53.83, V72.60/Z01.89 - 02/03/2021  · Medications  o loratadine 10 mg oral tablet   SIG: take 1 tablet (10 mg) by oral route once daily for 90 days   DISP: (90) Tablet with 1 refills  Prescribed on 02/06/2021     o Medications have been Reconciled  o Transition of Care or Provider Policy  · Instructions  o Depression Screen completed and scanned into the EMR under the designated folder within the patient's documents.  o Today's PHQ-9 result is _3__  o Take all medications as prescribed/directed.  o Patient was educated/instructed on their diagnosis, treatment and medications prior to discharge from the clinic today.  o Patient instructed to seek medical attention urgently for new or worsening symptoms.  o Call the office with any concerns or questions.  o 6 month follow up  o Electronically Identified Patient Education Materials Provided Electronically  · Disposition  o Call or Return if symptoms worsen or persist.            Electronically Signed by: MARILYN Santillan -Author on February 13, 2021 12:17:18 AM

## 2021-05-15 VITALS
DIASTOLIC BLOOD PRESSURE: 64 MMHG | OXYGEN SATURATION: 97 % | SYSTOLIC BLOOD PRESSURE: 121 MMHG | HEIGHT: 62 IN | BODY MASS INDEX: 33.77 KG/M2 | HEART RATE: 65 BPM | WEIGHT: 183.5 LBS

## 2021-05-15 VITALS
OXYGEN SATURATION: 98 % | DIASTOLIC BLOOD PRESSURE: 64 MMHG | HEART RATE: 69 BPM | SYSTOLIC BLOOD PRESSURE: 128 MMHG | WEIGHT: 188 LBS

## 2021-05-15 VITALS
BODY MASS INDEX: 32.8 KG/M2 | HEIGHT: 63 IN | WEIGHT: 185.12 LBS | DIASTOLIC BLOOD PRESSURE: 70 MMHG | OXYGEN SATURATION: 97 % | SYSTOLIC BLOOD PRESSURE: 140 MMHG | HEART RATE: 68 BPM

## 2021-05-15 VITALS — WEIGHT: 185 LBS | HEIGHT: 63 IN | HEART RATE: 69 BPM | BODY MASS INDEX: 32.78 KG/M2 | OXYGEN SATURATION: 98 %

## 2021-05-15 VITALS
BODY MASS INDEX: 34.6 KG/M2 | DIASTOLIC BLOOD PRESSURE: 69 MMHG | OXYGEN SATURATION: 98 % | SYSTOLIC BLOOD PRESSURE: 129 MMHG | HEART RATE: 70 BPM | HEIGHT: 62 IN | WEIGHT: 188 LBS

## 2021-05-15 VITALS — WEIGHT: 185.25 LBS | BODY MASS INDEX: 32.82 KG/M2 | OXYGEN SATURATION: 99 % | HEART RATE: 68 BPM | HEIGHT: 63 IN

## 2021-05-15 VITALS — HEIGHT: 62 IN | HEART RATE: 67 BPM | WEIGHT: 183 LBS | OXYGEN SATURATION: 99 % | BODY MASS INDEX: 33.68 KG/M2

## 2021-05-15 VITALS
BODY MASS INDEX: 32.31 KG/M2 | HEART RATE: 69 BPM | DIASTOLIC BLOOD PRESSURE: 67 MMHG | SYSTOLIC BLOOD PRESSURE: 114 MMHG | HEIGHT: 63 IN | OXYGEN SATURATION: 97 % | WEIGHT: 182.37 LBS

## 2021-05-15 VITALS
BODY MASS INDEX: 32.43 KG/M2 | HEART RATE: 67 BPM | DIASTOLIC BLOOD PRESSURE: 66 MMHG | HEIGHT: 63 IN | OXYGEN SATURATION: 97 % | SYSTOLIC BLOOD PRESSURE: 111 MMHG | WEIGHT: 183 LBS

## 2021-05-22 ENCOUNTER — TRANSCRIBE ORDERS (OUTPATIENT)
Dept: ADMINISTRATIVE | Facility: HOSPITAL | Age: 56
End: 2021-05-22

## 2021-05-22 DIAGNOSIS — Z12.31 SCREENING MAMMOGRAM, ENCOUNTER FOR: Primary | ICD-10-CM

## 2021-06-28 DIAGNOSIS — J30.9 ALLERGIC RHINITIS, UNSPECIFIED SEASONALITY, UNSPECIFIED TRIGGER: Primary | ICD-10-CM

## 2021-06-29 RX ORDER — FLUTICASONE PROPIONATE 50 MCG
SPRAY, SUSPENSION (ML) NASAL
COMMUNITY
Start: 2021-04-15 | End: 2022-02-14

## 2021-06-29 RX ORDER — OMEPRAZOLE 20 MG/1
CAPSULE, DELAYED RELEASE ORAL
COMMUNITY
Start: 2021-03-04 | End: 2022-08-18 | Stop reason: SDUPTHER

## 2021-06-29 RX ORDER — LORATADINE 10 MG/1
TABLET ORAL
COMMUNITY
Start: 2021-04-11 | End: 2021-06-29 | Stop reason: SDUPTHER

## 2021-06-29 RX ORDER — LEVOTHYROXINE SODIUM 0.03 MG/1
TABLET ORAL
COMMUNITY
Start: 2021-04-08 | End: 2021-07-12

## 2021-06-29 RX ORDER — ERENUMAB-AOOE 140 MG/ML
INJECTION, SOLUTION SUBCUTANEOUS
COMMUNITY
Start: 2021-04-15 | End: 2022-02-18

## 2021-06-29 RX ORDER — LANOLIN ALCOHOL/MO/W.PET/CERES
CREAM (GRAM) TOPICAL
COMMUNITY
Start: 2021-02-03 | End: 2022-02-18

## 2021-06-29 RX ORDER — LORATADINE 10 MG/1
TABLET ORAL
Qty: 90 TABLET | Refills: 3 | Status: SHIPPED | OUTPATIENT
Start: 2021-06-29 | End: 2021-08-18 | Stop reason: SDUPTHER

## 2021-07-09 ENCOUNTER — TELEPHONE (OUTPATIENT)
Dept: FAMILY MEDICINE CLINIC | Facility: CLINIC | Age: 56
End: 2021-07-09

## 2021-07-11 DIAGNOSIS — E03.9 HYPOTHYROIDISM, UNSPECIFIED TYPE: Primary | ICD-10-CM

## 2021-07-13 RX ORDER — LEVOTHYROXINE SODIUM 0.03 MG/1
25 TABLET ORAL DAILY
Qty: 90 TABLET | Refills: 0 | Status: SHIPPED | OUTPATIENT
Start: 2021-07-13 | End: 2021-08-20 | Stop reason: SDUPTHER

## 2021-08-02 ENCOUNTER — HOSPITAL ENCOUNTER (OUTPATIENT)
Dept: MAMMOGRAPHY | Facility: HOSPITAL | Age: 56
Discharge: HOME | End: 2021-08-02
Admitting: OBSTETRICS & GYNECOLOGY

## 2021-08-02 DIAGNOSIS — Z12.31 SCREENING MAMMOGRAM, ENCOUNTER FOR: ICD-10-CM

## 2021-08-02 PROCEDURE — 77063 BREAST TOMOSYNTHESIS BI: CPT

## 2021-08-02 PROCEDURE — 77067 SCR MAMMO BI INCL CAD: CPT

## 2021-08-18 ENCOUNTER — LAB (OUTPATIENT)
Dept: LAB | Facility: HOSPITAL | Age: 56
End: 2021-08-18

## 2021-08-18 ENCOUNTER — OFFICE VISIT (OUTPATIENT)
Dept: FAMILY MEDICINE CLINIC | Facility: CLINIC | Age: 56
End: 2021-08-18

## 2021-08-18 VITALS
RESPIRATION RATE: 18 BRPM | DIASTOLIC BLOOD PRESSURE: 77 MMHG | TEMPERATURE: 97.9 F | OXYGEN SATURATION: 98 % | WEIGHT: 192.8 LBS | BODY MASS INDEX: 34.16 KG/M2 | HEART RATE: 81 BPM | HEIGHT: 63 IN | SYSTOLIC BLOOD PRESSURE: 139 MMHG

## 2021-08-18 DIAGNOSIS — J30.9 ALLERGIC RHINITIS, UNSPECIFIED SEASONALITY, UNSPECIFIED TRIGGER: ICD-10-CM

## 2021-08-18 DIAGNOSIS — E03.9 HYPOTHYROIDISM, UNSPECIFIED TYPE: ICD-10-CM

## 2021-08-18 DIAGNOSIS — G43.809 OTHER MIGRAINE WITHOUT STATUS MIGRAINOSUS, NOT INTRACTABLE: Primary | ICD-10-CM

## 2021-08-18 PROBLEM — E07.9 THYROID DISORDER: Status: ACTIVE | Noted: 2021-08-18

## 2021-08-18 PROBLEM — J30.2 SEASONAL ALLERGIC RHINITIS: Status: ACTIVE | Noted: 2021-08-18

## 2021-08-18 PROBLEM — N32.9 BLADDER DISORDER: Status: ACTIVE | Noted: 2021-08-18

## 2021-08-18 LAB — TSH SERPL DL<=0.05 MIU/L-ACNC: 2.5 UIU/ML (ref 0.27–4.2)

## 2021-08-18 PROCEDURE — 36415 COLL VENOUS BLD VENIPUNCTURE: CPT

## 2021-08-18 PROCEDURE — 84443 ASSAY THYROID STIM HORMONE: CPT

## 2021-08-18 PROCEDURE — 99213 OFFICE O/P EST LOW 20 MIN: CPT | Performed by: NURSE PRACTITIONER

## 2021-08-18 RX ORDER — MULTIPLE VITAMINS W/ MINERALS TAB 9MG-400MCG
1 TAB ORAL DAILY
COMMUNITY
End: 2022-08-18

## 2021-08-18 RX ORDER — AZELASTINE HYDROCHLORIDE 0.5 MG/ML
SOLUTION/ DROPS OPHTHALMIC
COMMUNITY
Start: 2021-06-28 | End: 2022-02-14

## 2021-08-18 RX ORDER — RIMEGEPANT SULFATE 75 MG/75MG
75 TABLET, ORALLY DISINTEGRATING ORAL DAILY PRN
Qty: 30 TABLET | Refills: 2 | Status: SHIPPED | OUTPATIENT
Start: 2021-08-18 | End: 2022-02-18

## 2021-08-18 RX ORDER — RIMEGEPANT SULFATE 75 MG/75MG
TABLET, ORALLY DISINTEGRATING ORAL
COMMUNITY
Start: 2021-02-03 | End: 2021-08-18 | Stop reason: SDUPTHER

## 2021-08-18 RX ORDER — VORTIOXETINE 10 MG/1
TABLET, FILM COATED ORAL
COMMUNITY
End: 2022-02-18

## 2021-08-18 RX ORDER — LORATADINE 10 MG/1
10 TABLET ORAL DAILY
Qty: 90 TABLET | Refills: 3 | Status: SHIPPED | OUTPATIENT
Start: 2021-08-18 | End: 2022-08-18 | Stop reason: SDUPTHER

## 2021-08-18 NOTE — PROGRESS NOTES
"Chief Complaint  Hypothyroidism (3 month follow-up)    Subjective          Dorina Basilio presents to [facility]  History of Present Illness    She  has a past medical history of Acid reflux, Allergic, Bladder disorder, Hypothyroidism, Pap smear for cervical cancer screening (01/2020), Seasonal allergies, and Thyroid disorder.     Hypothyroid-last tsh 5.55 she is taking synthroid 25mcg qd. States some days she has more energy than others. She cannot tell a big difference since starting the synthroid. Ordered repeat tsh.     Depression-reports her mood is good on Trintellix. States she missed a couple days of trintellix and she was irritable.     Migraines-reports she has 4 migraines days per month compared to daily.       Objective   Vital Signs:   /77 (BP Location: Left arm, Patient Position: Sitting, Cuff Size: Adult)   Pulse 81   Temp 97.9 °F (36.6 °C) (Oral)   Resp 18   Ht 160 cm (63\")   Wt 87.5 kg (192 lb 12.8 oz)   SpO2 98%   BMI 34.15 kg/m²     Physical Exam  Constitutional:       Appearance: Normal appearance.   Neck:      Thyroid: No thyroid mass, thyromegaly or thyroid tenderness.   Cardiovascular:      Rate and Rhythm: Normal rate and regular rhythm.      Pulses: Normal pulses.      Heart sounds: Normal heart sounds.   Pulmonary:      Effort: Pulmonary effort is normal.      Breath sounds: Normal breath sounds.   Musculoskeletal:      Right lower leg: No edema.      Left lower leg: No edema.   Neurological:      Mental Status: She is alert.        Result Review :     TSH    TSH 4/8/21   TSH 5.550 (A)   (A) Abnormal value                   Assessment and Plan    Diagnoses and all orders for this visit:    1. Other migraine without status migrainosus, not intractable (Primary)  Comments:  Migraines-reports she has 4 migraines days per month compared to daily.   Orders:  -     Rimegepant Sulfate (Nurtec) 75 MG tablet dispersible tablet; Take 1 tablet by mouth " Daily As Needed (migraines).  Dispense: 30 tablet; Refill: 2    2. Hypothyroidism, unspecified type  Comments:  last tsh 5.55 she is taking synthroid 25mcg qd. States some days she has more energy than others. She cannot tell a big difference since starting the synthroid.  Orders:  -     TSH; Future    3. Allergic rhinitis, unspecified seasonality, unspecified trigger  Comments:  reports her allergies are much better w/additon of claritin  Orders:  -     loratadine (CLARITIN) 10 MG tablet; Take 1 tablet by mouth Daily.  Dispense: 90 tablet; Refill: 3        Follow Up   Return in about 6 months (around 2/18/2022).  Patient was given instructions and counseling regarding her condition or for health maintenance advice. Please see specific information pulled into the AVS if appropriate.     Dorina Basilio  reports that she has never smoked. She has never used smokeless tobacco..

## 2021-08-20 ENCOUNTER — TELEPHONE (OUTPATIENT)
Dept: FAMILY MEDICINE CLINIC | Facility: CLINIC | Age: 56
End: 2021-08-20

## 2021-08-20 DIAGNOSIS — E03.9 HYPOTHYROIDISM, UNSPECIFIED TYPE: ICD-10-CM

## 2021-08-20 RX ORDER — LEVOTHYROXINE SODIUM 0.03 MG/1
25 TABLET ORAL DAILY
Qty: 90 TABLET | Refills: 0 | Status: SHIPPED | OUTPATIENT
Start: 2021-08-20 | End: 2021-10-13 | Stop reason: SDUPTHER

## 2021-08-20 NOTE — TELEPHONE ENCOUNTER
Caller: Dorina Basilio    Relationship: Self    Best call back number: [phone]    Medication needed:   Requested Prescriptions     Pending Prescriptions Disp Refills   • levothyroxine (SYNTHROID, LEVOTHROID) 25 MCG tablet 90 tablet 0     Sig: Take 1 tablet by mouth Daily for 90 days.     omeprazole (priLOSEC) 20 MG capsule    When do you need the refill by: 8/20/21    Does the patient have less than a 3 day supply:  [x] Yes  [] No    What is the patient's preferred pharmacy: [id number] [place] [address] AT [facility] [address] - [phone] [facility] [phone] FX

## 2021-08-20 NOTE — TELEPHONE ENCOUNTER
----- Message from MARILYN Diallo sent at 8/19/2021  9:15 PM EDT -----  Tsh level normal-continue synthroid 25mcg qd

## 2021-09-22 ENCOUNTER — OFFICE VISIT (OUTPATIENT)
Dept: ORTHOPEDIC SURGERY | Facility: CLINIC | Age: 56
End: 2021-09-22

## 2021-09-22 VITALS — BODY MASS INDEX: 33.66 KG/M2 | HEIGHT: 63 IN | HEART RATE: 76 BPM | OXYGEN SATURATION: 98 % | WEIGHT: 190 LBS

## 2021-09-22 DIAGNOSIS — M65.331 TRIGGER MIDDLE FINGER OF RIGHT HAND: Primary | ICD-10-CM

## 2021-09-22 PROCEDURE — 20550 NJX 1 TENDON SHEATH/LIGAMENT: CPT | Performed by: ORTHOPAEDIC SURGERY

## 2021-09-22 PROCEDURE — 99203 OFFICE O/P NEW LOW 30 MIN: CPT | Performed by: ORTHOPAEDIC SURGERY

## 2021-09-22 RX ORDER — METHYLPREDNISOLONE ACETATE 80 MG/ML
80 INJECTION, SUSPENSION INTRA-ARTICULAR; INTRALESIONAL; INTRAMUSCULAR; SOFT TISSUE
Status: COMPLETED | OUTPATIENT
Start: 2021-09-22 | End: 2021-09-22

## 2021-09-22 RX ORDER — LIDOCAINE HYDROCHLORIDE 10 MG/ML
1 INJECTION, SOLUTION INFILTRATION; PERINEURAL
Status: COMPLETED | OUTPATIENT
Start: 2021-09-22 | End: 2021-09-22

## 2021-09-22 RX ADMIN — LIDOCAINE HYDROCHLORIDE 1 ML: 10 INJECTION, SOLUTION INFILTRATION; PERINEURAL at 08:21

## 2021-09-22 RX ADMIN — METHYLPREDNISOLONE ACETATE 80 MG: 80 INJECTION, SUSPENSION INTRA-ARTICULAR; INTRALESIONAL; INTRAMUSCULAR; SOFT TISSUE at 08:21

## 2021-09-22 NOTE — PROGRESS NOTES
"Chief Complaint  Initial Evaluation of the Right Hand     Subjective      Dorina Basilio presents to [facility] for an evaluation of right hand. Patient states that her middle finger is locking and catching on her for the last 1-2 months. She denies any numbness and tingling at this time. She states locking and catching does cause her significant pain.     Allergies   Allergen Reactions   • Piroxicam Rash        Social History     Socioeconomic History   • Marital status:      Spouse name: Not on file   • Number of children: Not on file   • Years of education: Not on file   • Highest education level: Not on file   Tobacco Use   • Smoking status: Never Smoker   • Smokeless tobacco: Never Used   Vaping Use   • Vaping Use: Never used   Substance and Sexual Activity   • Alcohol use: Yes     Comment: Rarely drinks, less than 1 drink per day   • Drug use: Never   • Sexual activity: Defer        Review of Systems     Objective   Vital Signs:   Pulse 76   Ht 160 cm (63\")   Wt 86.2 kg (190 lb)   SpO2 98%   BMI 33.66 kg/m²       Physical Exam  Constitutional:       Appearance: Normal appearance. Patient is well-developed and normal weight.   HENT:      Head: Normocephalic.      Right Ear: Hearing and external ear normal.      Left Ear: Hearing and external ear normal.      Nose: Nose normal.   Eyes:      Conjunctiva/sclera: Conjunctivae normal.   Cardiovascular:      Rate and Rhythm: Normal rate.   Pulmonary:      Effort: Pulmonary effort is normal.      Breath sounds: No wheezing or rales.   Abdominal:      Palpations: Abdomen is soft.      Tenderness: There is no abdominal tenderness.   Musculoskeletal:      Cervical back: Normal range of motion.   Skin:     Findings: No rash.   Neurological:      Mental Status: Patient  is alert and oriented to person, place, and time.   Psychiatric:         Mood and Affect: Mood and affect normal.         Judgment: Judgment normal.       Ortho " Exam      RIGHT HAND: Tender A1 pulley. Positive triggering of the middle finger. Neurovascular intact. Sensation grossly intact. No swelling, atrophy, and skin discoloration. Skin intact. Full ROM. Patient able to wiggle fingers and make a fist. Full wrist extension, full wrist flexion, full , full thumb opposition, full PIP flexors, full DIP flexors, full PIP extensors, full finger adduction, full finger abduction. Radial pulse 2+, ulnar pulse 2+. Negative Tinels.       Small Joint Arthrocentesis  Consent given by: patient  Site marked: site marked  Timeout: Immediately prior to procedure a time out was called to verify the correct patient, procedure, equipment, support staff and site/side marked as required   Procedure Details  Location: right middle trigger finger.  Preparation: Patient was prepped and draped in the usual sterile fashion  Needle gauge: 23G.  Medications administered: 80 mg methylPREDNISolone acetate 80 MG/ML; 1 mL lidocaine 1 %  Patient tolerance: patient tolerated the procedure well with no immediate complications              Imaging Results (Most Recent)     None           Result Review :       No results found.          Assessment and Plan     DX: Right middle trigger finger     Trigger finger release vs injection discussed in office today. Elective surgeries are cancelled at this time and patient opted for an injection. She was given a trigger finger injection and tolerated this well. If she wishes to proceed with a trigger finger release, she will give us a call back.     Discussed surgery., Risks/benefits discussed with patient including, but not limited to: infection, bleeding, neurovascular damage, re-rupture, aesthetic deformity, need for further surgery, and death. and Call or return if worsening symptoms.    Follow Up     Patient will call back to schedule surgery if she wishes to proceed.       Patient was given instructions and counseling regarding her condition or for health  maintenance advice. Please see specific information pulled into the AVS if appropriate.     Scribed for Vladimir López MD by Vicky Garvey.  09/22/21   08:12 EDT    I have personally performed the services described in this document as scribed by the above individual and it is both accurate and complete. Vladimir López MD 09/22/21

## 2021-10-13 DIAGNOSIS — E03.9 HYPOTHYROIDISM, UNSPECIFIED TYPE: ICD-10-CM

## 2021-10-13 RX ORDER — LEVOTHYROXINE SODIUM 0.03 MG/1
25 TABLET ORAL DAILY
Qty: 90 TABLET | Refills: 1 | Status: SHIPPED | OUTPATIENT
Start: 2021-10-13 | End: 2021-12-14

## 2021-10-27 ENCOUNTER — FLU SHOT (OUTPATIENT)
Dept: FAMILY MEDICINE CLINIC | Facility: CLINIC | Age: 56
End: 2021-10-27

## 2021-10-27 DIAGNOSIS — Z23 FLU VACCINE NEED: Primary | ICD-10-CM

## 2021-10-27 PROCEDURE — 90471 IMMUNIZATION ADMIN: CPT | Performed by: NURSE PRACTITIONER

## 2021-10-27 PROCEDURE — 90686 IIV4 VACC NO PRSV 0.5 ML IM: CPT | Performed by: NURSE PRACTITIONER

## 2021-12-14 DIAGNOSIS — E03.9 HYPOTHYROIDISM, UNSPECIFIED TYPE: ICD-10-CM

## 2021-12-14 RX ORDER — LEVOTHYROXINE SODIUM 0.03 MG/1
TABLET ORAL
Qty: 90 TABLET | Refills: 1 | Status: SHIPPED | OUTPATIENT
Start: 2021-12-14 | End: 2022-06-06

## 2021-12-22 ENCOUNTER — IMMUNIZATION (OUTPATIENT)
Dept: VACCINE CLINIC | Facility: HOSPITAL | Age: 56
End: 2021-12-22

## 2021-12-22 PROCEDURE — 91300 HC SARSCOV02 VAC 30MCG/0.3ML IM: CPT | Performed by: INTERNAL MEDICINE

## 2021-12-22 PROCEDURE — 0004A HC ADM SARSCOV2 30MCG/0.3ML BOOSTER: CPT | Performed by: INTERNAL MEDICINE

## 2022-02-14 DIAGNOSIS — J30.89 SEASONAL ALLERGIC RHINITIS DUE TO OTHER ALLERGIC TRIGGER: Primary | ICD-10-CM

## 2022-02-14 RX ORDER — AZELASTINE HYDROCHLORIDE 0.5 MG/ML
SOLUTION/ DROPS OPHTHALMIC
Qty: 18 ML | Refills: 4 | Status: SHIPPED | OUTPATIENT
Start: 2022-02-14 | End: 2023-02-21 | Stop reason: SDUPTHER

## 2022-02-14 RX ORDER — FLUTICASONE PROPIONATE 50 MCG
SPRAY, SUSPENSION (ML) NASAL
Qty: 48 G | Refills: 3 | Status: SHIPPED | OUTPATIENT
Start: 2022-02-14 | End: 2023-02-21 | Stop reason: SDUPTHER

## 2022-02-18 ENCOUNTER — OFFICE VISIT (OUTPATIENT)
Dept: FAMILY MEDICINE CLINIC | Facility: CLINIC | Age: 57
End: 2022-02-18

## 2022-02-18 VITALS
BODY MASS INDEX: 34.02 KG/M2 | DIASTOLIC BLOOD PRESSURE: 75 MMHG | SYSTOLIC BLOOD PRESSURE: 139 MMHG | HEIGHT: 63 IN | WEIGHT: 192 LBS | HEART RATE: 65 BPM | OXYGEN SATURATION: 97 %

## 2022-02-18 DIAGNOSIS — E03.9 HYPOTHYROIDISM, UNSPECIFIED TYPE: Primary | ICD-10-CM

## 2022-02-18 DIAGNOSIS — G43.809 OTHER MIGRAINE WITHOUT STATUS MIGRAINOSUS, NOT INTRACTABLE: ICD-10-CM

## 2022-02-18 DIAGNOSIS — Z01.89 ROUTINE LAB DRAW: ICD-10-CM

## 2022-02-18 DIAGNOSIS — E78.5 HYPERLIPIDEMIA, UNSPECIFIED HYPERLIPIDEMIA TYPE: ICD-10-CM

## 2022-02-18 PROCEDURE — 99213 OFFICE O/P EST LOW 20 MIN: CPT | Performed by: NURSE PRACTITIONER

## 2022-02-18 NOTE — PROGRESS NOTES
"Chief Complaint  Hypothyroidism    Subjective          Dorina Basilio presents to [facility]  Pt here today for 6 month follow up for hypothyroidism.    Pt takes Synthroid 25mg.    Pt due labs.    Pt declines needing refills at this time.     Mammo 08/02/21  Pap scheduled 03/02/22 with MARILYN Day  Pt reports Colonoscopy done at [place] in 2016    Reports in [place] she did a medication dump-states she restarted the allergy medications and chose to stay off the Trintellix because she did not geel it was working.     Migraines-reports she has had a few migraines over the last month. States she has had some stress headaches. States she kept a diary and was waking up in the am with a left temporal lobe headache. States she was making sure she was getting enough water. States she never gets enough sleep. She stopped the Aimovig. She is going to try magnesium and COQ10    States she has been drinking detox tea and that has helped her bloating and feels better overall.    States she is considering going back to work but unsure due to her husbands memory. He has hx of TBI.       She  has a past medical history of Acid reflux, Allergic, Bladder disorder, Hypothyroidism, Pap smear for cervical cancer screening (01/2020), Seasonal allergies, and Thyroid disorder.     Objective   Vital Signs:   /75   Pulse 65   Ht 160 cm (63\")   Wt 87.1 kg (192 lb)   SpO2 97%   BMI 34.01 kg/m²     Physical Exam  Constitutional:       Appearance: Normal appearance.   Neck:      Thyroid: No thyroid mass, thyromegaly or thyroid tenderness.      Vascular: No carotid bruit.   Cardiovascular:      Rate and Rhythm: Normal rate and regular rhythm.      Pulses: Normal pulses.      Heart sounds: Normal heart sounds.   Pulmonary:      Effort: Pulmonary effort is normal.      Breath sounds: Normal breath sounds.   Musculoskeletal:      Right lower leg: No edema.      Left lower leg: No edema.   Skin:     " General: Skin is warm and dry.   Neurological:      General: No focal deficit present.      Mental Status: She is alert and oriented to person, place, and time.   Psychiatric:         Behavior: Behavior normal.        Result Review :   The following data was reviewed by: MARILYN Diallo on 02/18/2022:  CMP    CMP 4/8/21   Glucose 102 (A)   BUN 16   Creatinine 0.91 (A)   Sodium 142   Potassium 4.0   Chloride 105   Calcium 9.3   Albumin 4.4   Total Bilirubin 0.31   Alkaline Phosphatase 104   AST (SGOT) 19   ALT (SGPT) 21   (A) Abnormal value            CBC    CBC 4/8/21   WBC 6.34   RBC 4.91   Hemoglobin 13.6   Hematocrit 42.9   MCV 87.4   MCH 27.7   MCHC 31.7 (A)   RDW 13.2   Platelets 335   (A) Abnormal value            Lipid Panel    Lipid Panel 4/8/21   Total Cholesterol 202 (A)   Triglycerides 88   HDL Cholesterol 75 (A)   VLDL Cholesterol 18   LDL Cholesterol  109 (A)   (A) Abnormal value       Comments are available for some flowsheets but are not being displayed.           TSH    TSH 4/8/21 8/18/21   TSH 5.550 (A) 2.500   (A) Abnormal value                     Past Surgical History:   Procedure Laterality Date   • CARPAL TUNNEL RELEASE  2016   • COLONOSCOPY  2016    NORMAL   • CYST REMOVAL  1979    throat   • CYST REMOVAL  2013    ganglion   • GALLBLADDER SURGERY  2003   • HAND SURGERY  2016   • LASER ABLATION  2008   • OTHER SURGICAL HISTORY  1992    Chromoendoscopy of upper or lower gastrointestinal tract   • OTHER SURGICAL HISTORY  1995    tuboplasty   • TONSILLECTOMY  1969   • TOOTH EXTRACTION  1978   • TRIGGER FINGER RELEASE  2016   • TUBAL ABDOMINAL LIGATION  2004   • UMBILICAL HERNIA REPAIR  2008   • VULVA SURGERY  1997      Family History   Problem Relation Age of Onset   • Diabetes Mother    • Ovarian cancer Mother    • Osteoporosis Mother    • Arthritis Mother    • Cancer Mother    • Diabetes Sister    • Diabetes Brother    • Arthritis Brother    • Diabetes Maternal Grandmother    •  Diabetes Maternal Grandfather    • Stomach cancer Other         uncle   • Diabetes Other         Aunt, uncle        Current Outpatient Medications:   •  azelastine (OPTIVAR) 0.05 % ophthalmic solution, INSTILL 1 DROP INTO AFFECTED EYE(S) TWICE A DAY, Disp: 18 mL, Rfl: 4  •  fluticasone (FLONASE) 50 MCG/ACT nasal spray, USE 1 TO 2 SPRAYS IN EACH NOSTRIL ONCE DAILY AS NEEDED, Disp: 48 g, Rfl: 3  •  hydrocortisone 2.5 % ointment, APPLY TO LESIONS ON FACE TWICE DAILY AS NEEDED, Disp: , Rfl:   •  levothyroxine (SYNTHROID, LEVOTHROID) 25 MCG tablet, TAKE 1 TABLET BY MOUTH DAILY, Disp: 90 tablet, Rfl: 1  •  loratadine (CLARITIN) 10 MG tablet, Take 1 tablet by mouth Daily., Disp: 90 tablet, Rfl: 3  •  multivitamin with minerals (MULTIVITAMIN ADULT PO), Take 1 tablet by mouth Daily., Disp: , Rfl:   •  omeprazole (priLOSEC) 20 MG capsule, OMEPRAZOLE 20MG CAPSULES TAKE 1 CAPSULE(20 MG) BY MOUTH EVERY DAY BEFORE A MEAL 3/4/2021  Active, Disp: , Rfl:    Assessment and Plan    Diagnoses and all orders for this visit:    1. Hypothyroidism, unspecified type (Primary)  Comments:  last tsh WNL-ordered repeat tsh. Refilled Synthroid  Orders:  -     TSH; Future  -     T4, free; Future    2. Hyperlipidemia, unspecified hyperlipidemia type  -     Lipid panel; Future    3. Routine lab draw  -     CBC w AUTO Differential; Future  -     Comprehensive metabolic panel; Future  -     Urinalysis With Culture If Indicated -; Future    4. Other migraine without status migrainosus, not intractable  Comments:  she discontinued her Aimovig-she wishes to try a more natural approach for treatment-she is going to start oct magnesium and COQ10.       Follow Up   Return in about 6 months (around 8/18/2022).  Patient was given instructions and counseling regarding her condition or for health maintenance advice. Please see specific information pulled into the AVS if appropriate.     Dorina Basilio  reports that she has never smoked. She has never used  smokeless tobacco..

## 2022-03-01 ENCOUNTER — LAB (OUTPATIENT)
Dept: LAB | Facility: HOSPITAL | Age: 57
End: 2022-03-01

## 2022-03-01 DIAGNOSIS — E78.5 HYPERLIPIDEMIA, UNSPECIFIED HYPERLIPIDEMIA TYPE: ICD-10-CM

## 2022-03-01 DIAGNOSIS — Z01.89 ROUTINE LAB DRAW: ICD-10-CM

## 2022-03-01 DIAGNOSIS — E03.9 HYPOTHYROIDISM, UNSPECIFIED TYPE: ICD-10-CM

## 2022-03-01 LAB
ALBUMIN SERPL-MCNC: 4.6 G/DL (ref 3.5–5.2)
ALBUMIN/GLOB SERPL: 1.7 G/DL
ALP SERPL-CCNC: 94 U/L (ref 39–117)
ALT SERPL W P-5'-P-CCNC: 21 U/L (ref 1–33)
ANION GAP SERPL CALCULATED.3IONS-SCNC: 13 MMOL/L (ref 5–15)
AST SERPL-CCNC: 15 U/L (ref 1–32)
BACTERIA UR QL AUTO: ABNORMAL /HPF
BASOPHILS # BLD AUTO: 0.04 10*3/MM3 (ref 0–0.2)
BASOPHILS NFR BLD AUTO: 0.8 % (ref 0–1.5)
BILIRUB SERPL-MCNC: 0.3 MG/DL (ref 0–1.2)
BILIRUB UR QL STRIP: NEGATIVE
BUN SERPL-MCNC: 15 MG/DL (ref 6–20)
BUN/CREAT SERPL: 14.6 (ref 7–25)
CALCIUM SPEC-SCNC: 9.9 MG/DL (ref 8.6–10.5)
CHLORIDE SERPL-SCNC: 105 MMOL/L (ref 98–107)
CHOLEST SERPL-MCNC: 188 MG/DL (ref 0–200)
CLARITY UR: CLEAR
CO2 SERPL-SCNC: 24 MMOL/L (ref 22–29)
COLOR UR: YELLOW
CREAT SERPL-MCNC: 1.03 MG/DL (ref 0.57–1)
DEPRECATED RDW RBC AUTO: 38.7 FL (ref 37–54)
EGFRCR SERPLBLD CKD-EPI 2021: 63.2 ML/MIN/1.73
EOSINOPHIL # BLD AUTO: 0.17 10*3/MM3 (ref 0–0.4)
EOSINOPHIL NFR BLD AUTO: 3.5 % (ref 0.3–6.2)
ERYTHROCYTE [DISTWIDTH] IN BLOOD BY AUTOMATED COUNT: 12.5 % (ref 12.3–15.4)
GLOBULIN UR ELPH-MCNC: 2.7 GM/DL
GLUCOSE SERPL-MCNC: 110 MG/DL (ref 65–99)
GLUCOSE UR STRIP-MCNC: NEGATIVE MG/DL
HCT VFR BLD AUTO: 41.6 % (ref 34–46.6)
HDLC SERPL-MCNC: 58 MG/DL (ref 40–60)
HGB BLD-MCNC: 13.7 G/DL (ref 12–15.9)
HGB UR QL STRIP.AUTO: NEGATIVE
HYALINE CASTS UR QL AUTO: ABNORMAL /LPF
IMM GRANULOCYTES # BLD AUTO: 0 10*3/MM3 (ref 0–0.05)
IMM GRANULOCYTES NFR BLD AUTO: 0 % (ref 0–0.5)
KETONES UR QL STRIP: NEGATIVE
LDLC SERPL CALC-MCNC: 114 MG/DL (ref 0–100)
LDLC/HDLC SERPL: 1.94 {RATIO}
LEUKOCYTE ESTERASE UR QL STRIP.AUTO: ABNORMAL
LYMPHOCYTES # BLD AUTO: 1.36 10*3/MM3 (ref 0.7–3.1)
LYMPHOCYTES NFR BLD AUTO: 27.9 % (ref 19.6–45.3)
MCH RBC QN AUTO: 28 PG (ref 26.6–33)
MCHC RBC AUTO-ENTMCNC: 32.9 G/DL (ref 31.5–35.7)
MCV RBC AUTO: 85.1 FL (ref 79–97)
MONOCYTES # BLD AUTO: 0.41 10*3/MM3 (ref 0.1–0.9)
MONOCYTES NFR BLD AUTO: 8.4 % (ref 5–12)
NEUTROPHILS NFR BLD AUTO: 2.89 10*3/MM3 (ref 1.7–7)
NEUTROPHILS NFR BLD AUTO: 59.4 % (ref 42.7–76)
NITRITE UR QL STRIP: NEGATIVE
NRBC BLD AUTO-RTO: 0 /100 WBC (ref 0–0.2)
PH UR STRIP.AUTO: 7 [PH] (ref 5–8)
PLATELET # BLD AUTO: 345 10*3/MM3 (ref 140–450)
PMV BLD AUTO: 10.4 FL (ref 6–12)
POTASSIUM SERPL-SCNC: 4.8 MMOL/L (ref 3.5–5.2)
PROT SERPL-MCNC: 7.3 G/DL (ref 6–8.5)
PROT UR QL STRIP: NEGATIVE
RBC # BLD AUTO: 4.89 10*6/MM3 (ref 3.77–5.28)
RBC # UR STRIP: ABNORMAL /HPF
REF LAB TEST METHOD: ABNORMAL
SODIUM SERPL-SCNC: 142 MMOL/L (ref 136–145)
SP GR UR STRIP: 1.02 (ref 1–1.03)
SQUAMOUS #/AREA URNS HPF: ABNORMAL /HPF
T4 FREE SERPL-MCNC: 1.09 NG/DL (ref 0.93–1.7)
TRIGL SERPL-MCNC: 87 MG/DL (ref 0–150)
TSH SERPL DL<=0.05 MIU/L-ACNC: 2.5 UIU/ML (ref 0.27–4.2)
UROBILINOGEN UR QL STRIP: ABNORMAL
VLDLC SERPL-MCNC: 16 MG/DL (ref 5–40)
WBC # UR STRIP: ABNORMAL /HPF
WBC NRBC COR # BLD: 4.87 10*3/MM3 (ref 3.4–10.8)

## 2022-03-01 PROCEDURE — 36415 COLL VENOUS BLD VENIPUNCTURE: CPT

## 2022-03-01 PROCEDURE — 81001 URINALYSIS AUTO W/SCOPE: CPT

## 2022-03-01 PROCEDURE — 84443 ASSAY THYROID STIM HORMONE: CPT

## 2022-03-01 PROCEDURE — 85025 COMPLETE CBC W/AUTO DIFF WBC: CPT

## 2022-03-01 PROCEDURE — 80061 LIPID PANEL: CPT

## 2022-03-01 PROCEDURE — 84439 ASSAY OF FREE THYROXINE: CPT

## 2022-03-01 PROCEDURE — 80053 COMPREHEN METABOLIC PANEL: CPT

## 2022-03-02 ENCOUNTER — OFFICE VISIT (OUTPATIENT)
Dept: OBSTETRICS AND GYNECOLOGY | Facility: CLINIC | Age: 57
End: 2022-03-02

## 2022-03-02 ENCOUNTER — TELEPHONE (OUTPATIENT)
Dept: FAMILY MEDICINE CLINIC | Facility: CLINIC | Age: 57
End: 2022-03-02

## 2022-03-02 VITALS
DIASTOLIC BLOOD PRESSURE: 80 MMHG | BODY MASS INDEX: 34.02 KG/M2 | SYSTOLIC BLOOD PRESSURE: 138 MMHG | HEIGHT: 63 IN | HEART RATE: 60 BPM | WEIGHT: 192 LBS

## 2022-03-02 DIAGNOSIS — N30.00 ACUTE CYSTITIS WITHOUT HEMATURIA: ICD-10-CM

## 2022-03-02 DIAGNOSIS — Z01.411 ENCOUNTER FOR GYNECOLOGICAL EXAMINATION WITH ABNORMAL FINDING: Primary | ICD-10-CM

## 2022-03-02 DIAGNOSIS — R73.09 ELEVATED GLUCOSE: Primary | ICD-10-CM

## 2022-03-02 PROCEDURE — 99396 PREV VISIT EST AGE 40-64: CPT | Performed by: OBSTETRICS & GYNECOLOGY

## 2022-03-02 PROCEDURE — 99213 OFFICE O/P EST LOW 20 MIN: CPT | Performed by: OBSTETRICS & GYNECOLOGY

## 2022-03-02 PROCEDURE — G0123 SCREEN CERV/VAG THIN LAYER: HCPCS | Performed by: OBSTETRICS & GYNECOLOGY

## 2022-03-02 RX ORDER — NITROFURANTOIN 25; 75 MG/1; MG/1
100 CAPSULE ORAL 2 TIMES DAILY
Qty: 10 CAPSULE | Refills: 0 | Status: SHIPPED | OUTPATIENT
Start: 2022-03-02 | End: 2022-03-07

## 2022-03-02 NOTE — PROGRESS NOTES
"Well Woman Visit    CC: Annual well woman exam       HPI:   56 y.o.No obstetric history on file. Contraception or HRT: Contraception:  Tubal ligation  Menses: none  Pain:  None  Incontinence concerns: No  Hx of abnormal pap:  No  Pt has concerns she would like to discuss. saw pcp yesterday and they checked her urine. Brought in report.       History: PMHx, Meds, Allergies, PSHx, Social Hx, and POBHx all reviewed and updated.      PHYSICAL EXAM:  /80   Pulse 60   Ht 160 cm (63\")   Wt 87.1 kg (192 lb)   BMI 34.01 kg/m²   General- NAD, alert and oriented, appropriate  Psych- Normal mood, good memory  Neck- No masses, no thyroid enlargement  CV- Regular rhythm, no murnurs  Resp- CTA to bases, no wheezes  Abdomen- Soft, non distended, non tender, no masses    Breast left-  Bilaterally symmetrical, no masses, non tender, no nipple discharge  Breast right- Bilaterally symmetrical, no masses, non tender, no nipple discharge    External genitalia- Normal female, no lesions  Urethra/meatus- Normal, no masses, non tender, no prolapse  Bladder- Normal, no masses, non tender, no prolapse  Vagina- Normal, no atrophy, no lesions, no discharge, no prolapse  Cvx- Normal, no lesions, no discharge, No cervical motion tenderness  Uterus- Normal size, shape & consistency.  Non tender, mobile, & no prolapse  Adnexa- No mass, non tender  Anus/Rectum/Perineum- Small hemorrhoids, non thrombosed, Hemoccult neg    Lymphatic- No palpable neck, axillary, or groin nodes  Ext- No edema, no cyanosis    Skin- No lesions, no rashes, no acanthosis nigricans        ASSESSMENT and PLAN:  WWE and Problem Visit    Diagnoses and all orders for this visit:    1. Encounter for gynecological examination with abnormal finding (Primary)  -     IGP,rfx Aptima HPV All Pth    2. Acute cystitis without hematuria  -     nitrofurantoin, macrocrystal-monohydrate, (Macrobid) 100 MG capsule; Take 1 capsule by mouth 2 (Two) Times a Day for 5 days.  Dispense: " 10 capsule; Refill: 0    reviewed urine test done yesterday: trace bacteria and leuk. Start Macrobid. Increase fluids. rto prn.     Domestic violence/abuse screen: negative    Depression screen: no SI    Preventative:   BREAST HEALTH- Monthly self breast exam importance and how to reviewed. MMG and/or MRI (prn) reviewed per society guidelines and her individual history. Mammo/MRI screen: Already up to date.  CERVICAL CANCER Screening- Reviewed current ASCCP guidelines for screening w and wo cotest HPV, age specific.  Screen: Updated today.  COLON CANCER Screening- Reviewed current medical society guidelines and options.  Colonoscopy screen:  Already up to date.  SEXUAL HEALTH: Declines STD screening.  VACCINATIONS Recommended: Flu annually, Zoster (51yo and older).  Importance discussed, risk being unvaccinated reviewed.  Questions answered  Smoking status- NON SMOKER.  Importance of avoiding second hand smoke.  Follow up PCP/Specialist PMHx and Labs  Myriad: already done      She understands the importance of having any ordered tests to be performed in a timely fashion.  She is encouraged to review her results online and/or contact or office if she has questions.     Follow Up:  Return if symptoms worsen or fail to improve.      Candelaria Nelson, APRN  03/02/2022

## 2022-03-02 NOTE — TELEPHONE ENCOUNTER
----- Message from MARILYN Diallo sent at 3/2/2022  7:11 AM EST -----  Trace leukocytes-is she having any symptoms?

## 2022-03-02 NOTE — TELEPHONE ENCOUNTER
----- Message from MARILYN Diallo sent at 3/1/2022  9:21 PM EST -----  LDL sl elevated-recommend low chol diet-rest of lipid panel normal. CMP shows elevated glucose-add a1c. Creatinine sl elevated increase fluid intake. Thyroid profile WNL. CBC WNL

## 2022-03-07 LAB
CONV .: NORMAL
CYTOLOGIST CVX/VAG CYTO: NORMAL
CYTOLOGY CVX/VAG DOC CYTO: NORMAL
CYTOLOGY CVX/VAG DOC THIN PREP: NORMAL
DX ICD CODE: NORMAL
HIV 1 & 2 AB SER-IMP: NORMAL
OTHER STN SPEC: NORMAL
STAT OF ADQ CVX/VAG CYTO-IMP: NORMAL

## 2022-04-18 ENCOUNTER — PREP FOR SURGERY (OUTPATIENT)
Dept: OTHER | Facility: HOSPITAL | Age: 57
End: 2022-04-18

## 2022-04-18 ENCOUNTER — OFFICE VISIT (OUTPATIENT)
Dept: ORTHOPEDIC SURGERY | Facility: CLINIC | Age: 57
End: 2022-04-18

## 2022-04-18 VITALS — WEIGHT: 192 LBS | BODY MASS INDEX: 34.02 KG/M2 | HEIGHT: 63 IN

## 2022-04-18 DIAGNOSIS — M65.331 TRIGGER MIDDLE FINGER OF RIGHT HAND: Primary | ICD-10-CM

## 2022-04-18 PROCEDURE — 99213 OFFICE O/P EST LOW 20 MIN: CPT | Performed by: PHYSICIAN ASSISTANT

## 2022-04-18 RX ORDER — CEFAZOLIN SODIUM 2 G/100ML
2 INJECTION, SOLUTION INTRAVENOUS ONCE
Status: CANCELLED | OUTPATIENT
Start: 2022-04-18 | End: 2022-04-18

## 2022-04-18 RX ORDER — CEFAZOLIN SODIUM IN 0.9 % NACL 3 G/100 ML
3 INTRAVENOUS SOLUTION, PIGGYBACK (ML) INTRAVENOUS ONCE
Status: CANCELLED | OUTPATIENT
Start: 2022-04-18 | End: 2022-04-18

## 2022-04-18 NOTE — PROGRESS NOTES
"Chief Complaint  Follow-up of the Right Hand    Subjective          Dorina Basilio presents to [facility] for follow-up of right hand, right long trigger finger.  Patient was last evaluated clinic on 09/22/2021, which time she discussed surgery versus injection with Dr. López.  Patient discussed operative management with Dr. López and had plan to schedule surgery once elective surgeries were back.  She states her injection helped for several months, however she has pain and triggering of her right middle finger once again.  She states pain often disrupts her sleep.  She has history of fourth trigger finger release and carpal tunnel release performed at [place] in the past.  Patient would like to discuss operative care at this time.    Objective   Allergies   Allergen Reactions   • Piroxicam Rash       Vital Signs:   Ht 160 cm (63\")   Wt 87.1 kg (192 lb)   BMI 34.01 kg/m²       Physical Exam  Constitutional:       Appearance: Normal appearance. Patient is well-developed and normal weight.   HENT:      Head: Normocephalic.      Right Ear: Hearing and external ear normal.      Left Ear: Hearing and external ear normal.      Nose: Nose normal.   Eyes:      Conjunctiva/sclera: Conjunctivae normal.   Cardiovascular:      Rate and Rhythm: Normal rate.   Pulmonary:      Effort: Pulmonary effort is normal.      Breath sounds: No wheezing or rales.   Abdominal:      Palpations: Abdomen is soft.      Tenderness: There is no abdominal tenderness.   Musculoskeletal:      Cervical back: Normal range of motion.   Skin:     Findings: No rash.   Neurological:      Mental Status: Patient is alert and oriented to person, place, and time.   Psychiatric:         Mood and Affect: Mood and affect normal.         Judgment: Judgment normal.     Ortho Exam  Right hand: Tenderness over the third A1 pulley, no discoloration or atrophy.  No swelling.  Scars present, well-healed.  Triggering of the middle finger " with range of motion.  Full finger abduction/adduction.  Full PIP/DIP flexion and extension.  Good muscle tone of wrist flexors and extensors.  Sensation is intact.  Neurovascular intact.  Radial and ulnar pulse are 2+.      Result Review :   The following data was reviewed by: STEFANI Ziegler on 04/18/2022:         Imaging Results (Most Recent)     None                Assessment and Plan    Problem List Items Addressed This Visit        Musculoskeletal and Injuries    Trigger middle finger of right hand - Primary           Discussed surgery., Risks/benefits discussed with patient including, but not limited to: infection, bleeding, neurovascular damage, re-rupture, aesthetic deformity, need for further surgery, and death.  Patient wishes to proceed with scheduling right long finger trigger finger release Dr. López.  She will follow up 2 weeks postoperatively.      Follow Up   Return for post op.  Patient Instructions   Patient would like to proceed with scheduling third finger, TFR today.     Follow up 2 weeks post operatively.     Patient was given instructions and counseling regarding her condition or for health maintenance advice. Please see specific information pulled into the AVS if appropriate.

## 2022-04-18 NOTE — PATIENT INSTRUCTIONS
Patient would like to proceed with scheduling third finger, TFR today.     Follow up 2 weeks post operatively.

## 2022-04-27 ENCOUNTER — ANESTHESIA EVENT (OUTPATIENT)
Dept: PERIOP | Facility: HOSPITAL | Age: 57
End: 2022-04-27

## 2022-04-28 ENCOUNTER — HOSPITAL ENCOUNTER (OUTPATIENT)
Facility: HOSPITAL | Age: 57
Setting detail: HOSPITAL OUTPATIENT SURGERY
Discharge: HOME | End: 2022-04-28
Attending: ORTHOPAEDIC SURGERY | Admitting: ORTHOPAEDIC SURGERY

## 2022-04-28 ENCOUNTER — ANESTHESIA (OUTPATIENT)
Dept: PERIOP | Facility: HOSPITAL | Age: 57
End: 2022-04-28

## 2022-04-28 VITALS
SYSTOLIC BLOOD PRESSURE: 99 MMHG | TEMPERATURE: 97 F | RESPIRATION RATE: 20 BRPM | HEART RATE: 68 BPM | OXYGEN SATURATION: 97 % | WEIGHT: 187.61 LBS | BODY MASS INDEX: 33.24 KG/M2 | DIASTOLIC BLOOD PRESSURE: 78 MMHG | HEIGHT: 63 IN

## 2022-04-28 DIAGNOSIS — M65.331 TRIGGER MIDDLE FINGER OF RIGHT HAND: Primary | ICD-10-CM

## 2022-04-28 PROCEDURE — 25010000002 PROPOFOL 10 MG/ML EMULSION: Performed by: NURSE ANESTHETIST, CERTIFIED REGISTERED

## 2022-04-28 PROCEDURE — 25010000002 MIDAZOLAM PER 1 MG: Performed by: ANESTHESIOLOGY

## 2022-04-28 PROCEDURE — 26055 INCISE FINGER TENDON SHEATH: CPT | Performed by: ORTHOPAEDIC SURGERY

## 2022-04-28 PROCEDURE — 25010000002 ONDANSETRON PER 1 MG: Performed by: NURSE ANESTHETIST, CERTIFIED REGISTERED

## 2022-04-28 PROCEDURE — 25010000002 CEFAZOLIN IN DEXTROSE 2-4 GM/100ML-% SOLUTION: Performed by: PHYSICIAN ASSISTANT

## 2022-04-28 PROCEDURE — 25010000002 FENTANYL CITRATE (PF) 50 MCG/ML SOLUTION: Performed by: NURSE ANESTHETIST, CERTIFIED REGISTERED

## 2022-04-28 RX ORDER — MAGNESIUM HYDROXIDE 1200 MG/15ML
LIQUID ORAL AS NEEDED
Status: DISCONTINUED | OUTPATIENT
Start: 2022-04-28 | End: 2022-04-28 | Stop reason: HOSPADM

## 2022-04-28 RX ORDER — ONDANSETRON 2 MG/ML
INJECTION INTRAMUSCULAR; INTRAVENOUS AS NEEDED
Status: DISCONTINUED | OUTPATIENT
Start: 2022-04-28 | End: 2022-04-28 | Stop reason: SURG

## 2022-04-28 RX ORDER — MEPERIDINE HYDROCHLORIDE 25 MG/ML
12.5 INJECTION INTRAMUSCULAR; INTRAVENOUS; SUBCUTANEOUS
Status: DISCONTINUED | OUTPATIENT
Start: 2022-04-28 | End: 2022-04-28 | Stop reason: HOSPADM

## 2022-04-28 RX ORDER — ACETAMINOPHEN 500 MG
1000 TABLET ORAL ONCE
Status: COMPLETED | OUTPATIENT
Start: 2022-04-28 | End: 2022-04-28

## 2022-04-28 RX ORDER — LIDOCAINE HYDROCHLORIDE 20 MG/ML
INJECTION, SOLUTION EPIDURAL; INFILTRATION; INTRACAUDAL; PERINEURAL AS NEEDED
Status: DISCONTINUED | OUTPATIENT
Start: 2022-04-28 | End: 2022-04-28 | Stop reason: SURG

## 2022-04-28 RX ORDER — BUPIVACAINE HYDROCHLORIDE 5 MG/ML
INJECTION, SOLUTION EPIDURAL; INTRACAUDAL AS NEEDED
Status: DISCONTINUED | OUTPATIENT
Start: 2022-04-28 | End: 2022-04-28 | Stop reason: HOSPADM

## 2022-04-28 RX ORDER — PROPOFOL 10 MG/ML
VIAL (ML) INTRAVENOUS AS NEEDED
Status: DISCONTINUED | OUTPATIENT
Start: 2022-04-28 | End: 2022-04-28 | Stop reason: SURG

## 2022-04-28 RX ORDER — CEFAZOLIN SODIUM IN 0.9 % NACL 3 G/100 ML
3 INTRAVENOUS SOLUTION, PIGGYBACK (ML) INTRAVENOUS ONCE
Status: DISCONTINUED | OUTPATIENT
Start: 2022-04-28 | End: 2022-04-28

## 2022-04-28 RX ORDER — FENTANYL CITRATE 50 UG/ML
INJECTION, SOLUTION INTRAMUSCULAR; INTRAVENOUS AS NEEDED
Status: DISCONTINUED | OUTPATIENT
Start: 2022-04-28 | End: 2022-04-28 | Stop reason: SURG

## 2022-04-28 RX ORDER — ONDANSETRON 2 MG/ML
4 INJECTION INTRAMUSCULAR; INTRAVENOUS ONCE AS NEEDED
Status: DISCONTINUED | OUTPATIENT
Start: 2022-04-28 | End: 2022-04-28 | Stop reason: HOSPADM

## 2022-04-28 RX ORDER — PROMETHAZINE HYDROCHLORIDE 25 MG/1
25 SUPPOSITORY RECTAL ONCE AS NEEDED
Status: DISCONTINUED | OUTPATIENT
Start: 2022-04-28 | End: 2022-04-28 | Stop reason: HOSPADM

## 2022-04-28 RX ORDER — HYDROCODONE BITARTRATE AND ACETAMINOPHEN 7.5; 325 MG/1; MG/1
1 TABLET ORAL EVERY 4 HOURS PRN
Qty: 12 TABLET | Refills: 0 | Status: SHIPPED | OUTPATIENT
Start: 2022-04-28 | End: 2022-08-18

## 2022-04-28 RX ORDER — SODIUM CHLORIDE, SODIUM LACTATE, POTASSIUM CHLORIDE, CALCIUM CHLORIDE 600; 310; 30; 20 MG/100ML; MG/100ML; MG/100ML; MG/100ML
9 INJECTION, SOLUTION INTRAVENOUS CONTINUOUS PRN
Status: DISCONTINUED | OUTPATIENT
Start: 2022-04-28 | End: 2022-04-28 | Stop reason: HOSPADM

## 2022-04-28 RX ORDER — MIDAZOLAM HYDROCHLORIDE 1 MG/ML
2 INJECTION INTRAMUSCULAR; INTRAVENOUS ONCE
Status: COMPLETED | OUTPATIENT
Start: 2022-04-28 | End: 2022-04-28

## 2022-04-28 RX ORDER — CEFAZOLIN SODIUM 2 G/100ML
2 INJECTION, SOLUTION INTRAVENOUS ONCE
Status: COMPLETED | OUTPATIENT
Start: 2022-04-28 | End: 2022-04-28

## 2022-04-28 RX ORDER — OXYCODONE HYDROCHLORIDE 5 MG/1
5 TABLET ORAL
Status: DISCONTINUED | OUTPATIENT
Start: 2022-04-28 | End: 2022-04-28 | Stop reason: HOSPADM

## 2022-04-28 RX ORDER — PROMETHAZINE HYDROCHLORIDE 12.5 MG/1
25 TABLET ORAL ONCE AS NEEDED
Status: DISCONTINUED | OUTPATIENT
Start: 2022-04-28 | End: 2022-04-28 | Stop reason: HOSPADM

## 2022-04-28 RX ADMIN — MIDAZOLAM HYDROCHLORIDE 2 MG: 1 INJECTION, SOLUTION INTRAMUSCULAR; INTRAVENOUS at 06:50

## 2022-04-28 RX ADMIN — FENTANYL CITRATE 50 MCG: 50 INJECTION, SOLUTION INTRAMUSCULAR; INTRAVENOUS at 07:05

## 2022-04-28 RX ADMIN — ACETAMINOPHEN 1000 MG: 500 TABLET ORAL at 06:48

## 2022-04-28 RX ADMIN — PROPOFOL 100 MCG/KG/MIN: 10 INJECTION, EMULSION INTRAVENOUS at 07:06

## 2022-04-28 RX ADMIN — ONDANSETRON 4 MG: 2 INJECTION INTRAMUSCULAR; INTRAVENOUS at 07:25

## 2022-04-28 RX ADMIN — CEFAZOLIN SODIUM 2 G: 2 INJECTION, SOLUTION INTRAVENOUS at 06:50

## 2022-04-28 RX ADMIN — FENTANYL CITRATE 50 MCG: 50 INJECTION, SOLUTION INTRAMUSCULAR; INTRAVENOUS at 07:11

## 2022-04-28 RX ADMIN — PROPOFOL 50 MG: 10 INJECTION, EMULSION INTRAVENOUS at 07:06

## 2022-04-28 RX ADMIN — LIDOCAINE HYDROCHLORIDE 100 MG: 20 INJECTION, SOLUTION EPIDURAL; INFILTRATION; INTRACAUDAL; PERINEURAL at 07:06

## 2022-04-28 RX ADMIN — SODIUM CHLORIDE, POTASSIUM CHLORIDE, SODIUM LACTATE AND CALCIUM CHLORIDE 9 ML/HR: 600; 310; 30; 20 INJECTION, SOLUTION INTRAVENOUS at 06:50

## 2022-04-28 NOTE — ANESTHESIA POSTPROCEDURE EVALUATION
Patient: Dorina Basilio    Procedure Summary     Date: 04/28/22 Room / Location: [facility] OR  / [facility]    Anesthesia Start: 0704 Anesthesia Stop: 0743    Procedure: RIGHT TRIGGER FINGER  RELEASE (long/middle finger) (Right Fingers) Diagnosis:       Trigger middle finger of right hand      (Trigger middle finger of right hand [M65.331])    Surgeons: Vladimir López MD Provider: Sohan Oh MD    Anesthesia Type: general ASA Status: 2          Anesthesia Type: general    Vitals  Vitals Value Taken Time   BP 99/78 04/28/22 0802   Temp 36.1 °C (97 °F) 04/28/22 0756   Pulse 68 04/28/22 0802   Resp 20 04/28/22 0802   SpO2 97 % 04/28/22 0802           Post Anesthesia Care and Evaluation    Patient location during evaluation: bedside  Patient participation: complete - patient participated  Level of consciousness: awake, responsive to verbal stimuli, responsive to light touch, responsive to noxious stimuli, responsive to painful stimuli and responsive to physical stimuli  Pain score: 2  Pain management: adequate  Airway patency: patent  Anesthetic complications: No anesthetic complications  PONV Status: none  Cardiovascular status: acceptable and stable  Respiratory status: acceptable and nasal cannula  Hydration status: acceptable    Comments: An Anesthesiologist personally participated in the most demanding procedures (including induction and emergence if applicable) in the anesthesia plan, monitored the course of anesthesia administration at frequent intervals and remained physically present and available for immediate diagnosis and treatment of emergencies.

## 2022-04-28 NOTE — ANESTHESIA PREPROCEDURE EVALUATION
Anesthesia Evaluation     Patient summary reviewed and Nursing notes reviewed   no history of anesthetic complications:  NPO Solid Status: > 8 hours  NPO Liquid Status: > 2 hours           Airway   Mallampati: II  TM distance: >3 FB  Neck ROM: full  No difficulty expected  Dental      Pulmonary - negative pulmonary ROS and normal exam    breath sounds clear to auscultation  Cardiovascular - negative cardio ROS and normal exam  Exercise tolerance: good (4-7 METS)    Rhythm: regular  Rate: normal        Neuro/Psych  (+) headaches,    GI/Hepatic/Renal/Endo    (+)  GERD,  thyroid problem hypothyroidism    Musculoskeletal (-) negative ROS    Abdominal    Substance History - negative use     OB/GYN negative ob/gyn ROS         Other - negative ROS                       Anesthesia Plan    ASA 2     general   (Patient understands anesthesia not responsible for dental damage.    No rosalinda block, ga/heavy sedation by anesthesia local by Been)  intravenous induction     Anesthetic plan, all risks, benefits, and alternatives have been provided, discussed and informed consent has been obtained with: patient.  Use of blood products discussed with patient .   Plan discussed with CRNA.        CODE STATUS:

## 2022-05-11 ENCOUNTER — OFFICE VISIT (OUTPATIENT)
Dept: ORTHOPEDIC SURGERY | Facility: CLINIC | Age: 57
End: 2022-05-11

## 2022-05-11 VITALS — BODY MASS INDEX: 33.35 KG/M2 | WEIGHT: 188.2 LBS | HEART RATE: 73 BPM | HEIGHT: 63 IN | OXYGEN SATURATION: 98 %

## 2022-05-11 DIAGNOSIS — Z47.89 AFTERCARE FOLLOWING SURGERY OF THE MUSCULOSKELETAL SYSTEM: Primary | ICD-10-CM

## 2022-05-11 PROCEDURE — 99024 POSTOP FOLLOW-UP VISIT: CPT | Performed by: PHYSICIAN ASSISTANT

## 2022-05-11 NOTE — PROGRESS NOTES
"Chief Complaint  Follow-up of the Right Hand and Suture / Staple Removal    Subjective          Dorina Basilio presents to [facility] for s/p right trigger finger release of the middle finger performed on 04-28-22 by Dr. López. Patient states she has been working on motion of her hand. She states she has no pain, numbness or tingling. She states she has improvement of finger motion post operatively.     Objective   Allergies   Allergen Reactions   • Piroxicam Rash       Vital Signs:   Pulse 73   Ht 160 cm (63\")   Wt 85.4 kg (188 lb 3.2 oz)   SpO2 98%   BMI 33.34 kg/m²       Physical Exam  Constitutional:       Appearance: Normal appearance. Patient is well-developed and normal weight.   HENT:      Head: Normocephalic.      Right Ear: Hearing and external ear normal.      Left Ear: Hearing and external ear normal.      Nose: Nose normal.   Eyes:      Conjunctiva/sclera: Conjunctivae normal.   Cardiovascular:      Rate and Rhythm: Normal rate.   Pulmonary:      Effort: Pulmonary effort is normal.      Breath sounds: No wheezing or rales.   Abdominal:      Palpations: Abdomen is soft.      Tenderness: There is no abdominal tenderness.   Musculoskeletal:      Cervical back: Normal range of motion.   Skin:     Findings: No rash.   Neurological:      Mental Status: Patient is alert and oriented to person, place, and time.   Psychiatric:         Mood and Affect: Mood and affect normal.         Judgment: Judgment normal.     Ortho Exam  RIGHT HAND: Surgical incision is well-healing, no surrounding erythema or active drainage, sutures removed. Full finger abduction/adduction, full DIP/PIP flexion and extension, able to form full fist, good thumb opposition. Good wrist flexion and extension, supination and pronation. Sensation is intact, neurovascular intact distally.     Result Review :   The following data was reviewed by: STEFANI Ziegler on 05/11/2022:         Imaging Results (Most " Recent)     None                Assessment and Plan    Problem List Items Addressed This Visit        Musculoskeletal and Injuries    Aftercare following long trigger finger release - Primary      Patient deferred hand therapy today, therefore home exercise list was provided.  Patient like to continue with home exercises.  Discussed incision care to avoid incision being submerged in water, pat dry after doing dishes and showering.  Follow-up as needed.    Follow Up   Return if symptoms worsen or fail to improve.  Patient Instructions   Discussed incision care, do not submerge hand in water until the incision is fully healed up. Continue with home exercises, list provided today.       Follow up as needed.     Patient was given instructions and counseling regarding her condition or for health maintenance advice. Please see specific information pulled into the AVS if appropriate.

## 2022-05-11 NOTE — PATIENT INSTRUCTIONS
Discussed incision care, do not submerge hand in water until the incision is fully healed up. Continue with home exercises, list provided today.       Follow up as needed.

## 2022-06-06 DIAGNOSIS — E03.9 HYPOTHYROIDISM, UNSPECIFIED TYPE: ICD-10-CM

## 2022-06-06 RX ORDER — LEVOTHYROXINE SODIUM 0.03 MG/1
25 TABLET ORAL
Qty: 90 TABLET | Refills: 1 | Status: SHIPPED | OUTPATIENT
Start: 2022-06-06 | End: 2022-12-07

## 2022-08-18 ENCOUNTER — OFFICE VISIT (OUTPATIENT)
Dept: FAMILY MEDICINE CLINIC | Facility: CLINIC | Age: 57
End: 2022-08-18

## 2022-08-18 ENCOUNTER — LAB (OUTPATIENT)
Dept: LAB | Facility: HOSPITAL | Age: 57
End: 2022-08-18

## 2022-08-18 VITALS
SYSTOLIC BLOOD PRESSURE: 133 MMHG | DIASTOLIC BLOOD PRESSURE: 71 MMHG | BODY MASS INDEX: 32.67 KG/M2 | HEIGHT: 63 IN | HEART RATE: 62 BPM | WEIGHT: 184.4 LBS | OXYGEN SATURATION: 96 %

## 2022-08-18 DIAGNOSIS — E53.8 VITAMIN B12 DEFICIENCY: ICD-10-CM

## 2022-08-18 DIAGNOSIS — J30.9 ALLERGIC RHINITIS, UNSPECIFIED SEASONALITY, UNSPECIFIED TRIGGER: ICD-10-CM

## 2022-08-18 DIAGNOSIS — R73.09 ELEVATED GLUCOSE: ICD-10-CM

## 2022-08-18 DIAGNOSIS — Z23 NEED FOR SHINGLES VACCINE: Primary | ICD-10-CM

## 2022-08-18 DIAGNOSIS — K21.9 GASTROESOPHAGEAL REFLUX DISEASE, UNSPECIFIED WHETHER ESOPHAGITIS PRESENT: ICD-10-CM

## 2022-08-18 DIAGNOSIS — Z00.00 HEALTHCARE MAINTENANCE: ICD-10-CM

## 2022-08-18 DIAGNOSIS — Z11.59 ENCOUNTER FOR HEPATITIS C SCREENING TEST FOR LOW RISK PATIENT: ICD-10-CM

## 2022-08-18 LAB
HBA1C MFR BLD: 6.1 % (ref 4.8–5.6)
HCV AB SER DONR QL: NORMAL

## 2022-08-18 PROCEDURE — 36415 COLL VENOUS BLD VENIPUNCTURE: CPT

## 2022-08-18 PROCEDURE — 83036 HEMOGLOBIN GLYCOSYLATED A1C: CPT

## 2022-08-18 PROCEDURE — 99214 OFFICE O/P EST MOD 30 MIN: CPT | Performed by: NURSE PRACTITIONER

## 2022-08-18 PROCEDURE — 82607 VITAMIN B-12: CPT

## 2022-08-18 PROCEDURE — 86803 HEPATITIS C AB TEST: CPT

## 2022-08-18 RX ORDER — LORATADINE 10 MG/1
10 TABLET ORAL DAILY
Qty: 90 TABLET | Refills: 3 | Status: SHIPPED | OUTPATIENT
Start: 2022-08-18 | End: 2022-08-18 | Stop reason: SDUPTHER

## 2022-08-18 RX ORDER — LORATADINE 10 MG/1
10 TABLET ORAL DAILY
Qty: 90 TABLET | Refills: 1 | Status: SHIPPED | OUTPATIENT
Start: 2022-08-18 | End: 2023-02-21 | Stop reason: SDUPTHER

## 2022-08-18 RX ORDER — OMEPRAZOLE 20 MG/1
20 CAPSULE, DELAYED RELEASE ORAL DAILY
Qty: 90 CAPSULE | Refills: 1 | Status: SHIPPED | OUTPATIENT
Start: 2022-08-18 | End: 2023-02-21 | Stop reason: SDUPTHER

## 2022-08-18 NOTE — PROGRESS NOTES
5Chief Complaint  Hypothyroidism, Allergies, and Heartburn    SUBJECTIVE  Dorina Basilio presents to [facility]     Dorina Basilio is a 56-year-old female who presents today for a 6-month follow-up. She is accompanied by her  today.    Allergies  The patient reports her allergies have been bothering her a lot because she has been working outside in the yard a lot. She has been using eye drops, nasal spray, and Claritin, which helps minimize the symptoms, but her eyes are still itchy. She does not feel she needs to see an allergist.    Acid reflux  The patient reports her reflux is under good control as long as she takes the omeprazole as needed. She mentions she watches what she eats.    Trigger finger  The patient reports she had a trigger finger release done by Dr. Ruiz. She mentions she is still working on it. She does not have physical therapy for it, but she does her own exercises. She mentions she can at least make a fist now.    Mental health  The patient reports she has come off a lot of medications. She mentions her mental health is better. She is only concerned about her right now, which is secondary to her 's history. She is no longer taking Trintellix or Aimovig. She reports she has to find a natural holistic way to work with this. She mentions she stopped everything other than her thyroid. She reports she was drinking Gatorade Zero daily, but she did not realize it had so much sugar. She has lost 4 pounds since 05/2022. She states mentions does help her. She reports when she feels it really bad, she will drink a Mountain Dew Zero.    History of Present Illness  Past Medical History:   Diagnosis Date   • Acid reflux    • Bladder disorder     THEODORA/URGENCY   • Hypothyroidism    • Migraines    • Pap smear for cervical cancer screening 01/2020    normal   • Seasonal allergies       Family History   Problem Relation Age of Onset   • Diabetes Mother    •  "Ovarian cancer Mother    • Osteoporosis Mother    • Arthritis Mother    • Cancer Mother    • Diabetes Sister    • Diabetes Brother    • Arthritis Brother    • Diabetes Maternal Grandmother    • Diabetes Maternal Grandfather    • Stomach cancer Other         uncle   • Diabetes Other         Aunt, uncle   • Malig Hyperthermia Neg Hx       Past Surgical History:   Procedure Laterality Date   • CARPAL TUNNEL RELEASE Right 2016   • COLONOSCOPY  2016    NORMAL   • CYST REMOVAL  1979    throat   • CYST REMOVAL Right 2013    ganglion   • DIAGNOSTIC LAPAROSCOPY     • ENDOMETRIAL ABLATION     • GALLBLADDER SURGERY  2003   • KNEE ARTHROSCOPY Right    • LAPAROSCOPIC TUBAL LIGATION      FULGERATION   • OTHER SURGICAL HISTORY  1992    Chromoendoscopy of upper or lower gastrointestinal tract   • TONSILLECTOMY  1969   • TOOTH EXTRACTION  1978   • TRIGGER FINGER RELEASE Right 2016    4TH FINGER   • TRIGGER FINGER RELEASE Right 4/28/2022    Procedure: RIGHT TRIGGER FINGER  RELEASE (long/middle finger);  Surgeon: Vladimir López MD;  Location: [facility] OR [facility] [place];  Service: Orthopedics;  Laterality: Right;   • UMBILICAL HERNIA REPAIR  2008   • VULVA SURGERY  1997        Current Outpatient Medications:   •  azelastine (OPTIVAR) 0.05 % ophthalmic solution, INSTILL 1 DROP INTO AFFECTED EYE(S) TWICE A DAY, Disp: 18 mL, Rfl: 4  •  fluticasone (FLONASE) 50 MCG/ACT nasal spray, USE 1 TO 2 SPRAYS IN EACH NOSTRIL ONCE DAILY AS NEEDED, Disp: 48 g, Rfl: 3  •  levothyroxine (SYNTHROID, LEVOTHROID) 25 MCG tablet, Take 1 tablet by mouth Every Morning., Disp: 90 tablet, Rfl: 1  •  loratadine (CLARITIN) 10 MG tablet, Take 1 tablet by mouth Daily., Disp: 90 tablet, Rfl: 1  •  omeprazole (priLOSEC) 20 MG capsule, Take 1 capsule by mouth Daily., Disp: 90 capsule, Rfl: 1    OBJECTIVE  Vital Signs:   /71 (BP Location: Left arm, Patient Position: Sitting, Cuff Size: Adult)   Pulse 62   Ht 160 cm (63\")   Wt 83.6 kg (184 lb 6.4 oz)   SpO2 96%   " "Breastfeeding No   BMI 32.66 kg/m²    Estimated body mass index is 32.66 kg/m² as calculated from the following:    Height as of this encounter: 160 cm (63\").    Weight as of this encounter: 83.6 kg (184 lb 6.4 oz).     Wt Readings from Last 3 Encounters:   08/18/22 83.6 kg (184 lb 6.4 oz)   05/11/22 85.4 kg (188 lb 3.2 oz)   04/28/22 85.1 kg (187 lb 9.8 oz)     BP Readings from Last 3 Encounters:   08/18/22 133/71   04/28/22 99/78   03/02/22 138/80       Physical Exam  Vitals reviewed.   Constitutional:       Appearance: Normal appearance. She is well-developed.   Neck:      Thyroid: No thyromegaly.      Vascular: No carotid bruit.   Cardiovascular:      Rate and Rhythm: Normal rate and regular rhythm.      Heart sounds: No murmur heard.    No friction rub. No gallop.   Pulmonary:      Effort: Pulmonary effort is normal.      Breath sounds: Normal breath sounds. No wheezing or rhonchi.   Musculoskeletal:      Right lower leg: No edema.      Left lower leg: No edema.   Neurological:      Mental Status: She is alert and oriented to person, place, and time.      Cranial Nerves: No cranial nerve deficit.   Psychiatric:         Mood and Affect: Mood and affect normal.         Behavior: Behavior normal.         Thought Content: Thought content normal.         Judgment: Judgment normal.          Result Review    CMP    CMP 3/1/22   Glucose 110 (A)   BUN 15   Creatinine 1.03 (A)   Sodium 142   Potassium 4.8   Chloride 105   Calcium 9.9   Albumin 4.60   Total Bilirubin 0.3   Alkaline Phosphatase 94   AST (SGOT) 15   ALT (SGPT) 21   (A) Abnormal value            CBC    CBC 3/1/22   WBC 4.87   RBC 4.89   Hemoglobin 13.7   Hematocrit 41.6   MCV 85.1   MCH 28.0   MCHC 32.9   RDW 12.5   Platelets 345           CBC w/diff    CBC w/Diff 3/1/22   WBC 4.87   RBC 4.89   Hemoglobin 13.7   Hematocrit 41.6   MCV 85.1   MCH 28.0   MCHC 32.9   RDW 12.5   Platelets 345   Neutrophil Rel % 59.4   Immature Granulocyte Rel % 0.0   Lymphocyte " Rel % 27.9   Monocyte Rel % 8.4   Eosinophil Rel % 3.5   Basophil Rel % 0.8           Lipid Panel    Lipid Panel 3/1/22   Total Cholesterol 188   Triglycerides 87   HDL Cholesterol 58   VLDL Cholesterol 16   LDL Cholesterol  114 (A)   LDL/HDL Ratio 1.94   (A) Abnormal value            TSH    TSH 3/1/22   TSH 2.500           Most Recent A1C    HGBA1C Most Recent 8/18/22   Hemoglobin A1C 6.10 (A)   (A) Abnormal value              No Images in the past 120 days found..  .result    The above data has been reviewed by MARILYN Diallo 08/18/2022 08:15 EDT.          Patient Care Team:  Rebecca Marshall APRN as PCP - General (Nurse Practitioner)    BMI is >= 30 and <35. (Class 1 Obesity). The following options were offered after discussion;: exercise counseling/recommendations and nutrition counseling/recommendations       ASSESSMENT & PLAN    Diagnoses and all orders for this visit:    1. Need for shingles vaccine (Primary)  Comments:  gave pt script for Shingrex  Orders:  -     Shingrix Vaccine; Future    2. Allergic rhinitis, unspecified seasonality, unspecified trigger  Comments:  reports her allergies are much better w/additon of claritin. - I have refilled her Claritin.  Orders:  -     Discontinue: loratadine (CLARITIN) 10 MG tablet; Take 1 tablet by mouth Daily.  Dispense: 90 tablet; Refill: 3  -     loratadine (CLARITIN) 10 MG tablet; Take 1 tablet by mouth Daily.  Dispense: 90 tablet; Refill: 1    3. Gastroesophageal reflux disease, unspecified whether esophagitis present  Comments:  well controlled-- I have refilled her omeprazole.  Orders:  -     omeprazole (priLOSEC) 20 MG capsule; Take 1 capsule by mouth Daily.  Dispense: 90 capsule; Refill: 1    4. Encounter for hepatitis C screening test for low risk patient  -     Hepatitis C antibody; Future    5. Healthcare maintenance  Comments:  - I have ordered a hepatitis C antibody.             Tobacco Use: Low Risk    • Smoking Tobacco Use: Never  Smoker   • Smokeless Tobacco Use: Never Used       Follow Up     Return in about 6 months (around 2/18/2023).      Patient was given instructions and counseling regarding her condition or for health maintenance advice. Please see specific information pulled into the AVS if appropriate.   I have reviewed information obtained and documented by others and I have confirmed the accuracy of this documented note.    MARILYN Diallo      Transcribed from ambient dictation for MARILYN Diallo by VALERIE VERDUZCO.  08/18/22   10:40 EDT    Patient verbalized consent to the visit recording.

## 2022-08-19 DIAGNOSIS — E53.8 VITAMIN B12 DEFICIENCY: Primary | ICD-10-CM

## 2022-08-19 LAB — VIT B12 BLD-MCNC: 1017 PG/ML (ref 211–946)

## 2022-09-13 DIAGNOSIS — L98.9 SKIN LESION OF FACE: Primary | ICD-10-CM

## 2022-09-23 ENCOUNTER — TRANSCRIBE ORDERS (OUTPATIENT)
Dept: ADMINISTRATIVE | Facility: HOSPITAL | Age: 57
End: 2022-09-23

## 2022-09-23 DIAGNOSIS — Z12.31 SCREENING MAMMOGRAM FOR BREAST CANCER: Primary | ICD-10-CM

## 2022-09-26 ENCOUNTER — TELEPHONE (OUTPATIENT)
Dept: FAMILY MEDICINE CLINIC | Facility: CLINIC | Age: 57
End: 2022-09-26

## 2022-09-26 DIAGNOSIS — Z13.220 SCREENING FOR LIPID DISORDERS: ICD-10-CM

## 2022-09-26 DIAGNOSIS — E03.9 HYPOTHYROIDISM, UNSPECIFIED TYPE: Primary | ICD-10-CM

## 2022-09-26 NOTE — TELEPHONE ENCOUNTER
LAB CALLED, PATIENT WAS AT [facility] WANTING TO DO A1C AND LIPID BEFORE APPT. NEXT APPT IS 2/2023.  EXPLAINED THAT [facility] WAS NOT IN ON MONDAYS  BUT I WOULD PUT REQUEST IN.

## 2022-10-06 ENCOUNTER — TELEPHONE (OUTPATIENT)
Dept: FAMILY MEDICINE CLINIC | Facility: CLINIC | Age: 57
End: 2022-10-06

## 2022-10-07 ENCOUNTER — LAB (OUTPATIENT)
Dept: LAB | Facility: HOSPITAL | Age: 57
End: 2022-10-07

## 2022-10-07 DIAGNOSIS — E03.9 HYPOTHYROIDISM, UNSPECIFIED TYPE: ICD-10-CM

## 2022-10-07 DIAGNOSIS — Z13.220 SCREENING FOR LIPID DISORDERS: ICD-10-CM

## 2022-10-07 LAB
CHOLEST SERPL-MCNC: 178 MG/DL (ref 0–200)
HDLC SERPL-MCNC: 65 MG/DL (ref 40–60)
LDLC SERPL CALC-MCNC: 100 MG/DL (ref 0–100)
LDLC/HDLC SERPL: 1.53 {RATIO}
TRIGL SERPL-MCNC: 69 MG/DL (ref 0–150)
TSH SERPL DL<=0.05 MIU/L-ACNC: 4.91 UIU/ML (ref 0.27–4.2)
VLDLC SERPL-MCNC: 13 MG/DL (ref 5–40)

## 2022-10-07 PROCEDURE — 84443 ASSAY THYROID STIM HORMONE: CPT

## 2022-10-07 PROCEDURE — 80061 LIPID PANEL: CPT

## 2022-10-07 PROCEDURE — 36415 COLL VENOUS BLD VENIPUNCTURE: CPT

## 2022-10-11 DIAGNOSIS — E03.9 HYPOTHYROIDISM, UNSPECIFIED TYPE: Primary | ICD-10-CM

## 2022-10-26 ENCOUNTER — OFFICE VISIT (OUTPATIENT)
Dept: ORTHOPEDIC SURGERY | Facility: CLINIC | Age: 57
End: 2022-10-26

## 2022-10-26 VITALS — BODY MASS INDEX: 32.25 KG/M2 | HEIGHT: 63 IN | WEIGHT: 182 LBS | HEART RATE: 72 BPM | OXYGEN SATURATION: 98 %

## 2022-10-26 DIAGNOSIS — Z47.89 AFTERCARE FOLLOWING SURGERY OF THE MUSCULOSKELETAL SYSTEM: Primary | ICD-10-CM

## 2022-10-26 DIAGNOSIS — M65.331 TRIGGER MIDDLE FINGER OF RIGHT HAND: ICD-10-CM

## 2022-10-26 PROCEDURE — 20550 NJX 1 TENDON SHEATH/LIGAMENT: CPT | Performed by: ORTHOPAEDIC SURGERY

## 2022-10-26 PROCEDURE — 99213 OFFICE O/P EST LOW 20 MIN: CPT | Performed by: ORTHOPAEDIC SURGERY

## 2022-10-26 RX ORDER — TRIAMCINOLONE ACETONIDE 40 MG/ML
40 INJECTION, SUSPENSION INTRA-ARTICULAR; INTRAMUSCULAR
Status: COMPLETED | OUTPATIENT
Start: 2022-10-26 | End: 2022-10-26

## 2022-10-26 RX ORDER — MINOCYCLINE HYDROCHLORIDE 100 MG/1
CAPSULE ORAL
COMMUNITY
Start: 2022-10-19

## 2022-10-26 RX ORDER — LIDOCAINE HYDROCHLORIDE 10 MG/ML
1 INJECTION, SOLUTION INFILTRATION; PERINEURAL
Status: COMPLETED | OUTPATIENT
Start: 2022-10-26 | End: 2022-10-26

## 2022-10-26 RX ADMIN — LIDOCAINE HYDROCHLORIDE 1 ML: 10 INJECTION, SOLUTION INFILTRATION; PERINEURAL at 14:07

## 2022-10-26 RX ADMIN — TRIAMCINOLONE ACETONIDE 40 MG: 40 INJECTION, SUSPENSION INTRA-ARTICULAR; INTRAMUSCULAR at 14:07

## 2022-10-26 NOTE — PROGRESS NOTES
"Chief Complaint  Initial Evaluation of the Right Hand     Subjective      Dorina Basilio presents to [facility] for right hand. Patient has a history of right trigger finger release of the middle finger performed on 04-28-22 by Dr. López. Patient states she has been working on motion of her hand. She states she has no pain, numbness or tingling. She states she has improvement of finger motion post operatively. Patient has lump at times of 4th digit.  Patient has had locking and catching of the right middle finger.     Allergies   Allergen Reactions   • Piroxicam Rash        Social History     Socioeconomic History   • Marital status:    Tobacco Use   • Smoking status: Never   • Smokeless tobacco: Never   Vaping Use   • Vaping Use: Never used   Substance and Sexual Activity   • Alcohol use: Yes     Comment: Rarely drinks, less than 1 drink per day   • Drug use: Never   • Sexual activity: Defer     Partners: Male     Birth control/protection: Surgical        Review of Systems     Objective   Vital Signs:   Pulse 72   Ht 160 cm (63\")   Wt 82.6 kg (182 lb)   SpO2 98%   BMI 32.24 kg/m²       Physical Exam  Constitutional:       Appearance: Normal appearance. Patient is well-developed and normal weight.   HENT:      Head: Normocephalic.      Right Ear: Hearing and external ear normal.      Left Ear: Hearing and external ear normal.      Nose: Nose normal.   Eyes:      Conjunctiva/sclera: Conjunctivae normal.   Cardiovascular:      Rate and Rhythm: Normal rate.   Pulmonary:      Effort: Pulmonary effort is normal.      Breath sounds: No wheezing or rales.   Abdominal:      Palpations: Abdomen is soft.      Tenderness: There is no abdominal tenderness.   Musculoskeletal:      Cervical back: Normal range of motion.   Skin:     Findings: No rash.   Neurological:      Mental Status: Patient  is alert and oriented to person, place, and time.   Psychiatric:         Mood and Affect: Mood " and affect normal.         Judgment: Judgment normal.       Ortho Exam      RIGHT HAND: Surgical incision is well-healing , no surrounding erythema or active drainage, sutures removed. Full finger abduction/adduction, full DIP/PIP flexion and extension, able to form full fist, good thumb opposition. Good wrist flexion and extension, supination and pronation. Sensation is intact, neurovascular intact distally. Positive triggering of the middle finger. Tender A1 pulley of the 3rd digit.          Small Joint Arthrocentesis  Consent given by: patient  Site marked: site marked  Timeout: Immediately prior to procedure a time out was called to verify the correct patient, procedure, equipment, support staff and site/side marked as required   Supporting Documentation  Indications: pain   Procedure Details  Location: right trigger finger.  Preparation: Patient was prepped and draped in the usual sterile fashion  Needle gauge: 23G.  Medications administered: 1 mL lidocaine 1 %; 40 mg triamcinolone acetonide 40 MG/ML  Patient tolerance: patient tolerated the procedure well with no immediate complications              Imaging Results (Most Recent)     None           Result Review :               Assessment and Plan     Diagnoses and all orders for this visit:    1. Aftercare following long trigger finger release (Primary)    2. Trigger middle finger of right hand        Discussed conservative measures as exercises, anti-inflammatory and injection. She wishes to try middle finger trigger finger injection. Patient tolerated this injection well.     Call or return if worsening symptoms.    Follow Up     PRN      Patient was given instructions and counseling regarding her condition or for health maintenance advice. Please see specific information pulled into the AVS if appropriate.     Scribed for Vladimir López MD by Coral Enciso MA.  10/26/22   13:55 EDT      I have personally performed the services described in this document  as scribed by the above individual and it is both accurate and complete. Vladimir López MD 10/26/22

## 2022-11-03 ENCOUNTER — CLINICAL SUPPORT (OUTPATIENT)
Dept: FAMILY MEDICINE CLINIC | Facility: CLINIC | Age: 57
End: 2022-11-03

## 2022-11-03 DIAGNOSIS — Z23 NEED FOR INFLUENZA VACCINATION: Primary | ICD-10-CM

## 2022-11-03 PROCEDURE — 90686 IIV4 VACC NO PRSV 0.5 ML IM: CPT | Performed by: NURSE PRACTITIONER

## 2022-11-03 PROCEDURE — 90471 IMMUNIZATION ADMIN: CPT | Performed by: NURSE PRACTITIONER

## 2022-11-29 ENCOUNTER — APPOINTMENT (OUTPATIENT)
Dept: MAMMOGRAPHY | Facility: HOSPITAL | Age: 57
End: 2022-11-29

## 2022-12-01 ENCOUNTER — TELEPHONE (OUTPATIENT)
Dept: FAMILY MEDICINE CLINIC | Facility: CLINIC | Age: 57
End: 2022-12-01

## 2022-12-06 ENCOUNTER — LAB (OUTPATIENT)
Dept: LAB | Facility: HOSPITAL | Age: 57
End: 2022-12-06

## 2022-12-06 DIAGNOSIS — E03.9 HYPOTHYROIDISM, UNSPECIFIED TYPE: ICD-10-CM

## 2022-12-06 LAB
T4 FREE SERPL-MCNC: 1.02 NG/DL (ref 0.93–1.7)
TSH SERPL DL<=0.05 MIU/L-ACNC: 3.85 UIU/ML (ref 0.27–4.2)

## 2022-12-06 PROCEDURE — 84439 ASSAY OF FREE THYROXINE: CPT

## 2022-12-06 PROCEDURE — 84443 ASSAY THYROID STIM HORMONE: CPT

## 2022-12-06 PROCEDURE — 36415 COLL VENOUS BLD VENIPUNCTURE: CPT

## 2022-12-07 DIAGNOSIS — E03.9 HYPOTHYROIDISM, UNSPECIFIED TYPE: ICD-10-CM

## 2022-12-07 RX ORDER — LEVOTHYROXINE SODIUM 0.03 MG/1
TABLET ORAL
Qty: 90 TABLET | Refills: 0 | Status: SHIPPED | OUTPATIENT
Start: 2022-12-07 | End: 2023-02-21 | Stop reason: SDUPTHER

## 2022-12-14 ENCOUNTER — APPOINTMENT (OUTPATIENT)
Dept: MAMMOGRAPHY | Facility: HOSPITAL | Age: 57
End: 2022-12-14

## 2023-01-31 ENCOUNTER — CLINICAL SUPPORT (OUTPATIENT)
Dept: FAMILY MEDICINE CLINIC | Facility: CLINIC | Age: 58
End: 2023-01-31
Payer: OTHER GOVERNMENT

## 2023-01-31 DIAGNOSIS — Z23 NEED FOR COVID-19 VACCINE: Primary | ICD-10-CM

## 2023-01-31 PROCEDURE — 0124A PR ADM SARSCOV2 30MCG/0.3ML BST: CPT | Performed by: NURSE PRACTITIONER

## 2023-01-31 PROCEDURE — 91312 COVID-19 (PFIZER) BIVALENT BOOSTER 12+YRS: CPT | Performed by: NURSE PRACTITIONER

## 2023-02-21 ENCOUNTER — OFFICE VISIT (OUTPATIENT)
Dept: FAMILY MEDICINE CLINIC | Facility: CLINIC | Age: 58
End: 2023-02-21
Payer: OTHER GOVERNMENT

## 2023-02-21 ENCOUNTER — LAB (OUTPATIENT)
Dept: LAB | Facility: HOSPITAL | Age: 58
End: 2023-02-21
Payer: OTHER GOVERNMENT

## 2023-02-21 VITALS
OXYGEN SATURATION: 97 % | SYSTOLIC BLOOD PRESSURE: 131 MMHG | HEIGHT: 63 IN | BODY MASS INDEX: 32.11 KG/M2 | DIASTOLIC BLOOD PRESSURE: 83 MMHG | WEIGHT: 181.2 LBS | HEART RATE: 74 BPM

## 2023-02-21 DIAGNOSIS — Z00.00 ROUTINE GENERAL MEDICAL EXAMINATION AT A HEALTH CARE FACILITY: Primary | ICD-10-CM

## 2023-02-21 DIAGNOSIS — E55.9 VITAMIN D DEFICIENCY: ICD-10-CM

## 2023-02-21 DIAGNOSIS — E03.9 HYPOTHYROIDISM, UNSPECIFIED TYPE: ICD-10-CM

## 2023-02-21 DIAGNOSIS — Z00.00 ROUTINE GENERAL MEDICAL EXAMINATION AT A HEALTH CARE FACILITY: ICD-10-CM

## 2023-02-21 DIAGNOSIS — J30.9 ALLERGIC RHINITIS, UNSPECIFIED SEASONALITY, UNSPECIFIED TRIGGER: ICD-10-CM

## 2023-02-21 DIAGNOSIS — G43.819 OTHER MIGRAINE WITHOUT STATUS MIGRAINOSUS, INTRACTABLE: ICD-10-CM

## 2023-02-21 DIAGNOSIS — M65.331 TRIGGER MIDDLE FINGER OF RIGHT HAND: ICD-10-CM

## 2023-02-21 DIAGNOSIS — K21.9 GASTROESOPHAGEAL REFLUX DISEASE, UNSPECIFIED WHETHER ESOPHAGITIS PRESENT: ICD-10-CM

## 2023-02-21 DIAGNOSIS — J30.89 SEASONAL ALLERGIC RHINITIS DUE TO OTHER ALLERGIC TRIGGER: ICD-10-CM

## 2023-02-21 LAB
25(OH)D3 SERPL-MCNC: 33.5 NG/ML (ref 30–100)
ALBUMIN SERPL-MCNC: 4.7 G/DL (ref 3.5–5.2)
ALBUMIN/GLOB SERPL: 1.8 G/DL
ALP SERPL-CCNC: 93 U/L (ref 39–117)
ALT SERPL W P-5'-P-CCNC: 15 U/L (ref 1–33)
ANION GAP SERPL CALCULATED.3IONS-SCNC: 10.6 MMOL/L (ref 5–15)
AST SERPL-CCNC: 12 U/L (ref 1–32)
BACTERIA UR QL AUTO: ABNORMAL /HPF
BASOPHILS # BLD AUTO: 0.04 10*3/MM3 (ref 0–0.2)
BASOPHILS NFR BLD AUTO: 0.7 % (ref 0–1.5)
BILIRUB SERPL-MCNC: 0.2 MG/DL (ref 0–1.2)
BILIRUB UR QL STRIP: NEGATIVE
BUN SERPL-MCNC: 11 MG/DL (ref 6–20)
BUN/CREAT SERPL: 12.8 (ref 7–25)
CALCIUM SPEC-SCNC: 9.7 MG/DL (ref 8.6–10.5)
CHLORIDE SERPL-SCNC: 106 MMOL/L (ref 98–107)
CHOLEST SERPL-MCNC: 206 MG/DL (ref 0–200)
CLARITY UR: CLEAR
CO2 SERPL-SCNC: 25.4 MMOL/L (ref 22–29)
COLOR UR: YELLOW
CREAT SERPL-MCNC: 0.86 MG/DL (ref 0.57–1)
DEPRECATED RDW RBC AUTO: 39.5 FL (ref 37–54)
EGFRCR SERPLBLD CKD-EPI 2021: 77.9 ML/MIN/1.73
EOSINOPHIL # BLD AUTO: 0.22 10*3/MM3 (ref 0–0.4)
EOSINOPHIL NFR BLD AUTO: 4 % (ref 0.3–6.2)
ERYTHROCYTE [DISTWIDTH] IN BLOOD BY AUTOMATED COUNT: 12.8 % (ref 12.3–15.4)
GLOBULIN UR ELPH-MCNC: 2.6 GM/DL
GLUCOSE SERPL-MCNC: 98 MG/DL (ref 65–99)
GLUCOSE UR STRIP-MCNC: NEGATIVE MG/DL
HCT VFR BLD AUTO: 41.3 % (ref 34–46.6)
HDLC SERPL-MCNC: 67 MG/DL (ref 40–60)
HGB BLD-MCNC: 13.5 G/DL (ref 12–15.9)
HGB UR QL STRIP.AUTO: ABNORMAL
HYALINE CASTS UR QL AUTO: ABNORMAL /LPF
IMM GRANULOCYTES # BLD AUTO: 0.01 10*3/MM3 (ref 0–0.05)
IMM GRANULOCYTES NFR BLD AUTO: 0.2 % (ref 0–0.5)
KETONES UR QL STRIP: NEGATIVE
LDLC SERPL CALC-MCNC: 114 MG/DL (ref 0–100)
LDLC/HDLC SERPL: 1.64 {RATIO}
LEUKOCYTE ESTERASE UR QL STRIP.AUTO: ABNORMAL
LYMPHOCYTES # BLD AUTO: 1.36 10*3/MM3 (ref 0.7–3.1)
LYMPHOCYTES NFR BLD AUTO: 24.6 % (ref 19.6–45.3)
MCH RBC QN AUTO: 28 PG (ref 26.6–33)
MCHC RBC AUTO-ENTMCNC: 32.7 G/DL (ref 31.5–35.7)
MCV RBC AUTO: 85.5 FL (ref 79–97)
MONOCYTES # BLD AUTO: 0.29 10*3/MM3 (ref 0.1–0.9)
MONOCYTES NFR BLD AUTO: 5.2 % (ref 5–12)
NEUTROPHILS NFR BLD AUTO: 3.61 10*3/MM3 (ref 1.7–7)
NEUTROPHILS NFR BLD AUTO: 65.3 % (ref 42.7–76)
NITRITE UR QL STRIP: NEGATIVE
NRBC BLD AUTO-RTO: 0 /100 WBC (ref 0–0.2)
PH UR STRIP.AUTO: 6.5 [PH] (ref 5–8)
PLATELET # BLD AUTO: 264 10*3/MM3 (ref 140–450)
PMV BLD AUTO: 11.1 FL (ref 6–12)
POTASSIUM SERPL-SCNC: 3.9 MMOL/L (ref 3.5–5.2)
PROT SERPL-MCNC: 7.3 G/DL (ref 6–8.5)
PROT UR QL STRIP: NEGATIVE
RBC # BLD AUTO: 4.83 10*6/MM3 (ref 3.77–5.28)
RBC # UR STRIP: ABNORMAL /HPF
REF LAB TEST METHOD: ABNORMAL
SODIUM SERPL-SCNC: 142 MMOL/L (ref 136–145)
SP GR UR STRIP: 1.02 (ref 1–1.03)
SQUAMOUS #/AREA URNS HPF: ABNORMAL /HPF
TRIGL SERPL-MCNC: 145 MG/DL (ref 0–150)
TSH SERPL DL<=0.05 MIU/L-ACNC: 2.49 UIU/ML (ref 0.27–4.2)
UROBILINOGEN UR QL STRIP: ABNORMAL
VIT B12 BLD-MCNC: 1138 PG/ML (ref 211–946)
VLDLC SERPL-MCNC: 25 MG/DL (ref 5–40)
WBC # UR STRIP: ABNORMAL /HPF
WBC NRBC COR # BLD: 5.53 10*3/MM3 (ref 3.4–10.8)

## 2023-02-21 PROCEDURE — 82607 VITAMIN B-12: CPT

## 2023-02-21 PROCEDURE — 82306 VITAMIN D 25 HYDROXY: CPT

## 2023-02-21 PROCEDURE — 99396 PREV VISIT EST AGE 40-64: CPT | Performed by: NURSE PRACTITIONER

## 2023-02-21 PROCEDURE — 80061 LIPID PANEL: CPT

## 2023-02-21 PROCEDURE — 80053 COMPREHEN METABOLIC PANEL: CPT

## 2023-02-21 PROCEDURE — 84443 ASSAY THYROID STIM HORMONE: CPT

## 2023-02-21 PROCEDURE — 36415 COLL VENOUS BLD VENIPUNCTURE: CPT

## 2023-02-21 PROCEDURE — 85025 COMPLETE CBC W/AUTO DIFF WBC: CPT

## 2023-02-21 PROCEDURE — 81001 URINALYSIS AUTO W/SCOPE: CPT

## 2023-02-21 RX ORDER — LEVOTHYROXINE SODIUM 0.03 MG/1
25 TABLET ORAL EVERY MORNING
Qty: 90 TABLET | Refills: 1 | Status: SHIPPED | OUTPATIENT
Start: 2023-02-21

## 2023-02-21 RX ORDER — OMEPRAZOLE 20 MG/1
20 CAPSULE, DELAYED RELEASE ORAL DAILY
Qty: 90 CAPSULE | Refills: 1 | Status: SHIPPED | OUTPATIENT
Start: 2023-02-21

## 2023-02-21 RX ORDER — FLUTICASONE PROPIONATE 50 MCG
2 SPRAY, SUSPENSION (ML) NASAL DAILY
Qty: 48 G | Refills: 1 | Status: SHIPPED | OUTPATIENT
Start: 2023-02-21

## 2023-02-21 RX ORDER — LORATADINE 10 MG/1
10 TABLET ORAL DAILY
Qty: 90 TABLET | Refills: 1 | Status: SHIPPED | OUTPATIENT
Start: 2023-02-21

## 2023-02-21 RX ORDER — AZELASTINE HYDROCHLORIDE 0.5 MG/ML
1 SOLUTION/ DROPS OPHTHALMIC 2 TIMES DAILY
Qty: 18 ML | Refills: 1 | Status: SHIPPED | OUTPATIENT
Start: 2023-02-21

## 2023-02-21 NOTE — PROGRESS NOTES
Subjective   Dorina Basilio is a 57 y.o. female and is here for a comprehensive physical exam. The patient reports problems - migraines.    The patient presents today for a physical.    Migraines - The patient states she had a migraine the other day. She states she drank coffee and laid on the couch. The patient states she is not taking anything for the migraines. She states she has tried Nurtec and Ubrelvy in the past. The patient states she got frustrated and stopped. She denies taking any over the counter supplements. The patient states she does not take aspirin.    Menopause - The patient states she had a tubal ligation. She states she had an ablation. The patient states her last Pap smear was in 2023. She states she has had an abnormal Pap smear twice. The patient states she had a colposcopy. She states she has had 5 pregnancies. The patient states she gets migraines daily.    Sinusitis - The patient states she gets sinusitis. She states she takes Flonase, Claritin, and eye drops. The patient states if she does not continuously use the eye drops, she notices a difference.    Acne - The patient states she takes minocycline for flares.    Do you take any herbs or supplements that were not prescribed by a doctor? no  Are you taking calcium supplements? no  Are you taking aspirin daily? no     History:  LMP: No LMP recorded. (Menstrual status: Tubal ligation).  Menopause year unknown  Last pap date: 3/2/22  Abnormal pap? yes x 2 -she had a colposcopy -denies abnormal since. she had an ablation due to heavy menses  : 5  Para: 4 She lost a daughter at 5 months pregnant due to intrauterine fetal distress    The following portions of the patient's history were reviewed and updated as appropriate: allergies, current medications, past family history, past medical history, past social history, past surgical history and problem list.    Review of Systems  Do you have pain that bothers you in your daily life?  "no  A comprehensive review of systems was negative.    Objective   /83 (BP Location: Left arm, Patient Position: Sitting, Cuff Size: Adult)   Pulse 74   Ht 160 cm (63\")   Wt 82.2 kg (181 lb 3.2 oz)   SpO2 97%   BMI 32.10 kg/m²     General Appearance:    Alert, cooperative, no distress, appears stated age   Head:    Normocephalic, without obvious abnormality, atraumatic   Eyes:    PERRL, conjunctiva/corneas clear, EOM's intact, fundi     benign, both eyes   Ears:    Normal TM's and external ear canals, both ears   Nose:   Nares normal, septum midline, mucosa normal, no drainage    or sinus tenderness   Throat:   Lips, mucosa, and tongue normal; teeth and gums normal   Neck:   Supple, symmetrical, trachea midline, no adenopathy;     thyroid:  no enlargement/tenderness/nodules; no carotid    bruit or JVD   Back:     Symmetric, no curvature, ROM normal, no CVA tenderness   Lungs:     Clear to auscultation bilaterally, respirations unlabored   Chest Wall:    No tenderness or deformity    Heart:    Regular rate and rhythm, S1 and S2 normal, no murmur, rub   or gallop   Breast Exam:   deferred-mammogram scheduled tomorrow   Abdomen:     Soft, non-tender, bowel sounds active all four quadrants,     no masses, no organomegaly   Genitalia:  deferred   Rectal:  deferred   Extremities:   Extremities normal, atraumatic, no cyanosis or edema   Pulses:   2+ and symmetric all extremities   Skin:   Skin color, texture, turgor normal, no rashes or lesions   Lymph nodes:   Cervical, supraclavicular, and axillary nodes normal   Neurologic:   CNII-XII intact, normal strength, sensation and reflexes     throughout           Past Surgical History:   Procedure Laterality Date   • CARPAL TUNNEL RELEASE Right 2016   • COLONOSCOPY  2016    NORMAL   • CYST REMOVAL  1979    throat   • CYST REMOVAL Right 2013    ganglion   • DIAGNOSTIC LAPAROSCOPY     • ENDOMETRIAL ABLATION     • GALLBLADDER SURGERY  2003   • KNEE ARTHROSCOPY Right  "   • LAPAROSCOPIC TUBAL LIGATION      FULGERATION   • OTHER SURGICAL HISTORY  1992    Chromoendoscopy of upper or lower gastrointestinal tract   • TONSILLECTOMY  1969   • TOOTH EXTRACTION  1978   • TRIGGER FINGER RELEASE Right 2016    4TH FINGER   • TRIGGER FINGER RELEASE Right 4/28/2022    Procedure: RIGHT TRIGGER FINGER  RELEASE (long/middle finger);  Surgeon: Vladimir López MD;  [place] [facility] [place] [facility] [place]  Service: Orthopedics;  Laterality: Right;   • UMBILICAL HERNIA REPAIR  2008   • VULVA SURGERY  1997      Family History   Problem Relation Age of Onset   • Diabetes Mother    • Ovarian cancer Mother    • Osteoporosis Mother    • Arthritis Mother    • Cancer Mother    • Diabetes Sister    • Diabetes Brother    • Arthritis Brother    • Diabetes Maternal Grandmother    • Diabetes Maternal Grandfather    • Stomach cancer Other         uncle   • Diabetes Other         Aunt, uncle   • Malig Hyperthermia Neg Hx         Current Outpatient Medications:   •  azelastine (OPTIVAR) 0.05 % ophthalmic solution, Administer 1 drop to both eyes 2 (Two) Times a Day., Disp: 18 mL, Rfl: 1  •  fluticasone (FLONASE) 50 MCG/ACT nasal spray, 2 sprays into the nostril(s) as directed by provider Daily., Disp: 48 g, Rfl: 1  •  levothyroxine (SYNTHROID, LEVOTHROID) 25 MCG tablet, Take 1 tablet by mouth Every Morning., Disp: 90 tablet, Rfl: 1  •  loratadine (CLARITIN) 10 MG tablet, Take 1 tablet by mouth Daily., Disp: 90 tablet, Rfl: 1  •  omeprazole (priLOSEC) 20 MG capsule, Take 1 capsule by mouth Daily., Disp: 90 capsule, Rfl: 1  •  minocycline (MINOCIN,DYNACIN) 100 MG capsule, TAKE 1 CAPSULE BY MOUTH EVERY DAY AS NEEDED FOR FLARES, Disp: , Rfl:    Assessment and Plan  Diagnoses and all orders for this visit:    1. Routine general medical examination at a health care facility (Primary)  -     CBC & Differential; Future  -     Vitamin B12; Future  -     Lipid Panel; Future  -     TSH; Future  -     Comprehensive Metabolic Panel; Future  -      Urinalysis With Culture If Indicated -; Future    2. Vitamin D deficiency  -     Vitamin D,25-Hydroxy; Future    3. Seasonal allergic rhinitis due to other allergic trigger  -     azelastine (OPTIVAR) 0.05 % ophthalmic solution; Administer 1 drop to both eyes 2 (Two) Times a Day.  Dispense: 18 mL; Refill: 1  -     fluticasone (FLONASE) 50 MCG/ACT nasal spray; 2 sprays into the nostril(s) as directed by provider Daily.  Dispense: 48 g; Refill: 1    4. Hypothyroidism, unspecified type  -     levothyroxine (SYNTHROID, LEVOTHROID) 25 MCG tablet; Take 1 tablet by mouth Every Morning.  Dispense: 90 tablet; Refill: 1    5. Allergic rhinitis, unspecified seasonality, unspecified trigger  Comments:  reports her allergies are much better w/additon of claritin. - I have refilled her Claritin.  Orders:  -     loratadine (CLARITIN) 10 MG tablet; Take 1 tablet by mouth Daily.  Dispense: 90 tablet; Refill: 1    6. Gastroesophageal reflux disease, unspecified whether esophagitis present  Comments:  well controlled-- I have refilled her omeprazole.  Orders:  -     omeprazole (priLOSEC) 20 MG capsule; Take 1 capsule by mouth Daily.  Dispense: 90 capsule; Refill: 1    7. Other migraine without status migrainosus, intractable  Comments:  she has tried several meds for migraines but her insurance will not approve them-discussed otc magnesium.      Healthy female exam.   Routine general medical examination at a health care facility [Z00.00]     1. Labs ordered  2. Patient Counseling:  --Nutrition: Stressed importance of moderation in sodium/caffeine intake, saturated fat and cholesterol, caloric balance, sufficient intake of fresh fruits, vegetables, fiber, calcium, iron, and 1 mg of folate supplement per day (for females capable of pregnancy).  --Discussed the issue of estrogen replacement, calcium supplement, and the daily use of baby aspirin.  --Exercise: Stressed the importance of regular exercise.   --Substance Abuse: Discussed  cessation/primary prevention of tobacco, alcohol, or other drug use; driving or other dangerous activities under the influence; availability of treatment for abuse.    --Sexuality: Discussed sexually transmitted diseases, partner selection, use of condoms, avoidance of unintended pregnancy  and contraceptive alternatives.   --Injury prevention: Discussed safety belts, safety helmets, smoke detector, smoking near bedding or upholstery.   --Dental health: Discussed importance of regular tooth brushing, flossing, and dental visits.  --Immunizations reviewed.  --Discussed benefits of screening colonoscopy.  --After hours service discussed with patient    3. Discussed the patient's BMI with her.  The BMI is above average; BMI management plan is completed  4. Follow up in one year      The patient is advised to attempt to lose weight, reduce exposure to stress, continue current medications, continue current healthy lifestyle patterns and return for routine annual checkups.   Transcribed from ambient dictation for MARILYN Diallo by Tyra Chavez.  02/21/23   10:13 EST    Patient or patient representative verbalized consent to the visit recording.  I have personally performed the services described in this document as transcribed by the above individual, and it is both accurate and complete.

## 2023-02-22 ENCOUNTER — TELEPHONE (OUTPATIENT)
Dept: FAMILY MEDICINE CLINIC | Facility: CLINIC | Age: 58
End: 2023-02-22

## 2023-03-06 ENCOUNTER — TELEPHONE (OUTPATIENT)
Dept: FAMILY MEDICINE CLINIC | Facility: CLINIC | Age: 58
End: 2023-03-06
Payer: OTHER GOVERNMENT

## 2023-03-06 ENCOUNTER — OFFICE VISIT (OUTPATIENT)
Dept: OBSTETRICS AND GYNECOLOGY | Facility: CLINIC | Age: 58
End: 2023-03-06
Payer: OTHER GOVERNMENT

## 2023-03-06 VITALS
SYSTOLIC BLOOD PRESSURE: 132 MMHG | WEIGHT: 184.2 LBS | HEIGHT: 63 IN | DIASTOLIC BLOOD PRESSURE: 84 MMHG | BODY MASS INDEX: 32.64 KG/M2 | HEART RATE: 61 BPM

## 2023-03-06 DIAGNOSIS — Z01.419 ENCOUNTER FOR GYNECOLOGICAL EXAMINATION WITHOUT ABNORMAL FINDING: Primary | ICD-10-CM

## 2023-03-06 LAB
DEVELOPER EXPIRATION DATE: NORMAL
DEVELOPER LOT NUMBER: NORMAL
EXPIRATION DATE: NORMAL
FECAL OCCULT BLOOD SCREEN, POC: NEGATIVE
Lab: NORMAL
NEGATIVE CONTROL: NEGATIVE
POSITIVE CONTROL: POSITIVE

## 2023-03-06 PROCEDURE — G0123 SCREEN CERV/VAG THIN LAYER: HCPCS | Performed by: OBSTETRICS & GYNECOLOGY

## 2023-03-06 PROCEDURE — 82274 ASSAY TEST FOR BLOOD FECAL: CPT | Performed by: OBSTETRICS & GYNECOLOGY

## 2023-03-06 PROCEDURE — 99396 PREV VISIT EST AGE 40-64: CPT | Performed by: OBSTETRICS & GYNECOLOGY

## 2023-03-06 NOTE — TELEPHONE ENCOUNTER
Pt requesting call back about UA & culture completed 02/21/23 - pts phone number: [phone].

## 2023-03-06 NOTE — PROGRESS NOTES
"Well Woman Visit    CC: Annual well woman exam       HPI:   57 y.o. Contraception or HRT: Post menopausal, using no HRT  Menses:  none  Pain:  None  Incontinence concerns: No  Hx of abnormal pap:  No  Pt has no complaints today.      History: PMHx, Meds, Allergies, PSHx, Social Hx, and POBHx all reviewed and updated.      PHYSICAL EXAM:  /84   Pulse 61   Ht 160 cm (63\")   Wt 83.6 kg (184 lb 3.2 oz)   BMI 32.63 kg/m²   General- NAD, alert and oriented, appropriate  Psych- Normal mood, good memory  Neck- No masses, no thyroid enlargement  CV- Regular rhythm, no murnurs  Resp- CTA to bases, no wheezes  Abdomen- Soft, non distended, non tender, no masses    Breast left-  Bilaterally symmetrical, no masses, non tender, no nipple discharge  Breast right- Bilaterally symmetrical, no masses, non tender, no nipple discharge    External genitalia- Normal female, no lesions  Urethra/meatus- Normal, no masses, non tender, no prolapse  Bladder- Normal, no masses, non tender, no prolapse  Vagina- Normal, no atrophy, no lesions, no discharge, no prolapse  Cvx- Normal, no lesions, no discharge, No cervical motion tenderness  Uterus- Normal size, shape & consistency.  Non tender, mobile, & no prolapse  Adnexa- No mass, non tender  Anus/Rectum/Perineum- Small hemorrhoids, non thrombosed, Hemoccult neg    Lymphatic- No palpable neck, axillary, or groin nodes  Ext- No edema, no cyanosis    Skin- No lesions, no rashes, no acanthosis nigricans        ASSESSMENT and PLAN:  WWE    Diagnoses and all orders for this visit:    1. Encounter for gynecological examination without abnormal finding (Primary)  -     POC Occult Blood Stool  -     IGP,rfx Aptima HPV All Pth        Domestic violence/abuse screen: negative    Depression screen: no SI    Preventative:   BREAST HEALTH- Monthly self breast exam importance and how to reviewed. MMG and/or MRI (prn) reviewed per society guidelines and her individual history. Mammo/MRI " screen: Already up to date.  CERVICAL CANCER Screening- Reviewed current ASCCP guidelines for screening w and wo cotest HPV, age specific.  Screen: Updated today.  COLON CANCER Screening- Reviewed current medical society guidelines and options.  Colonoscopy screen:  Already up to date.  SEXUAL HEALTH: Declines STD screening.  VACCINATIONS Recommended: Flu annually, Zoster (49yo and older).  Importance discussed, risk being unvaccinated reviewed.  Questions answered  Smoking status- NON SMOKER.  Importance of avoiding second hand smoke.  Follow up PCP/Specialist PMHx and Labs  Myriad: negative testing in the past.      She understands the importance of having any ordered tests to be performed in a timely fashion.  She is encouraged to review her results online and/or contact or office if she has questions.     Follow Up:  Return if symptoms worsen or fail to improve.      Candelaria Nelson, APRN  03/06/2023

## 2023-03-07 ENCOUNTER — OFFICE VISIT (OUTPATIENT)
Dept: FAMILY MEDICINE CLINIC | Facility: CLINIC | Age: 58
End: 2023-03-07
Payer: OTHER GOVERNMENT

## 2023-03-07 VITALS
SYSTOLIC BLOOD PRESSURE: 152 MMHG | OXYGEN SATURATION: 98 % | HEART RATE: 59 BPM | BODY MASS INDEX: 32 KG/M2 | DIASTOLIC BLOOD PRESSURE: 73 MMHG | HEIGHT: 63 IN | WEIGHT: 180.6 LBS

## 2023-03-07 DIAGNOSIS — R31.9 HEMATURIA, UNSPECIFIED TYPE: Primary | ICD-10-CM

## 2023-03-07 LAB
BILIRUB BLD-MCNC: NEGATIVE MG/DL
CLARITY, POC: CLEAR
COLOR UR: YELLOW
EXPIRATION DATE: ABNORMAL
GLUCOSE UR STRIP-MCNC: NEGATIVE MG/DL
KETONES UR QL: NEGATIVE
LEUKOCYTE EST, POC: NEGATIVE
Lab: ABNORMAL
NITRITE UR-MCNC: NEGATIVE MG/ML
PH UR: 6 [PH] (ref 5–8)
PROT UR STRIP-MCNC: NEGATIVE MG/DL
RBC # UR STRIP: ABNORMAL /UL
SP GR UR: 1.02 (ref 1–1.03)
UROBILINOGEN UR QL: ABNORMAL

## 2023-03-07 PROCEDURE — 81003 URINALYSIS AUTO W/O SCOPE: CPT | Performed by: NURSE PRACTITIONER

## 2023-03-07 PROCEDURE — 99213 OFFICE O/P EST LOW 20 MIN: CPT | Performed by: NURSE PRACTITIONER

## 2023-03-07 NOTE — PROGRESS NOTES
"Chief Complaint  Blood in Urine    Subjective      Dorina Basilio is a 57 year old female that presents to [facility] with c/o blood in urine. She was seen in this office on 2/21/23 and had a trace amount of blood on UA.  Yesterday she had an appointment with gynecology and was told she still had blood in her urine.  She was instructed to follow up with pcp.  She denies urinary symptoms. She has not seen any gross hematuria or clots.  Previous history of a kidney stone. She had a pap smear yesterday and has had a small amount of spotting when wiping today.    She does note that she has a vaginal mesh that was placed in 2016 and she is having issues controlling her bladder again. This has been going on for \"a while\" but she has been putting it off.    Mother had ovarian cancer.                    History of Present Illness    Current Outpatient Medications   Medication Instructions   • azelastine (OPTIVAR) 0.05 % ophthalmic solution 1 drop, Both Eyes, 2 Times Daily   • fluticasone (FLONASE) 50 MCG/ACT nasal spray 2 sprays, Nasal, Daily   • levothyroxine (SYNTHROID, LEVOTHROID) 25 mcg, Oral, Every Morning   • loratadine (CLARITIN) 10 mg, Oral, Daily   • minocycline (MINOCIN,DYNACIN) 100 MG capsule TAKE 1 CAPSULE BY MOUTH EVERY DAY AS NEEDED FOR FLARES   • omeprazole (PRILOSEC) 20 mg, Oral, Daily       The following portions of the patient's history were reviewed and updated as appropriate: allergies, current medications, past family history, past medical history, past social history, past surgical history, and problem list.    Objective   Vital Signs:   /73 (BP Location: Right arm, Patient Position: Sitting, Cuff Size: Large Adult)   Pulse 59   Ht 160 cm (63\")   Wt 81.9 kg (180 lb 9.6 oz)   SpO2 98%   BMI 31.99 kg/m²     Wt Readings from Last 3 Encounters:   03/07/23 81.9 kg (180 lb 9.6 oz)   03/06/23 83.6 kg (184 lb 3.2 oz)   02/21/23 82.2 kg (181 lb 3.2 oz)     BP " Readings from Last 3 Encounters:   03/07/23 152/73   03/06/23 132/84   02/21/23 131/83       Physical Exam  Vitals reviewed.   Constitutional:       Appearance: Normal appearance. She is well-developed. She is not ill-appearing.   HENT:      Head: Normocephalic and atraumatic.   Pulmonary:      Effort: Pulmonary effort is normal.      Breath sounds: Normal breath sounds.   Skin:     General: Skin is warm and dry.   Neurological:      Mental Status: She is alert and oriented to person, place, and time.   Psychiatric:         Mood and Affect: Affect normal.          Result Review :    Brief Urine Lab Results  (Last result in the past 365 days)      Color   Clarity   Blood   Leuk Est   Nitrite   Protein   CREAT   Urine HCG        03/07/23 0831 Yellow   Clear   Small   Negative   Negative   Negative               UA    Urinalysis 2/21/23 2/21/23 3/7/23    0924 0924    Squamous Epithelial Cells, UA  0-2    Specific Gravity, UA 1.016     Ketones, UA Negative  Negative   Blood, UA Trace (A)     Leukocytes, UA Trace (A)  Negative   Nitrite, UA Negative     RBC, UA  6-12 (A)    WBC, UA  0-2    Bacteria, UA  None Seen    (A) Abnormal value              The following data was reviewed by: MARILYN Brewer on 03/07/2023:          No Images in the past 120 days found..        Procedures        Assessment and Plan   Diagnoses and all orders for this visit:    1. Hematuria, unspecified type (Primary)  Comments:  Recurrent.  Refer to urology for further evaluation  Orders:  -     POCT urinalysis dipstick, automated  -     Ambulatory Referral to Urology          There are no discontinued medications.       Follow Up     No follow-ups on file.  Patient was given instructions and counseling regarding her condition or for health maintenance advice. Please see specific information pulled into the AVS if appropriate.       MARILYN Brewer  03/07/23  08:51 EST            Answers for HPI/ROS submitted by the patient on  3/6/2023  Please describe your symptoms.: Lab results show blood in urine  Have you had these symptoms before?: Yes  How long have you been having these symptoms?: Greater than 2 weeks  Please list any medications you are currently taking for this condition.: none  Please describe any probable cause for these symptoms. : possible uti  What is the primary reason for your visit?: Other

## 2023-03-13 LAB
CONV .: NORMAL
CYTOLOGIST CVX/VAG CYTO: NORMAL
CYTOLOGY CVX/VAG DOC CYTO: NORMAL
CYTOLOGY CVX/VAG DOC THIN PREP: NORMAL
DX ICD CODE: NORMAL
HIV 1 & 2 AB SER-IMP: NORMAL
Lab: NORMAL
OTHER STN SPEC: NORMAL
STAT OF ADQ CVX/VAG CYTO-IMP: NORMAL

## 2023-03-30 NOTE — PROGRESS NOTES
"Chief Complaint: Blood in Urine    Subjective         History of Present Illness  Dorina Basilio is a 57 y.o. female presents to [facility] to be seen for hematuria.    Patient was seen by PCP on 3/7/2023 with a diagnosis of hematuria and was referred here for further evaluation.  She also reported at that time that she had \"vaginal mesh \"in 2016 but has been having trouble with her bladder again.  She was seen by another primary care provider in that office on 3/31 and discussed that she was having difficulty with urinary urgency with incontinence.    Frequency- every 1/2 hour- states she does drink a lot of water    Urgency- admits    Incontinence- with urgency    Nocturia- 3-4    Stream- normal    GH- denies     surgeries- 2016 vaginal mesh- had THEODORA prior    Family history of  malignancy- denies    Cardiopulmonary- denies    Anticoagulants- denies    Smoker- denies    History of kidney stones- last was in 2009, passed spontaneously    Drinks water, cranberry juice, coffee x 1-2 cups per day    UA with microscopy  2/21/2023 6-12 RBCs per high-powered field  3/1/2022 0-2 RBCs per high-powered field    Mom had ovarian CA    Objective     Past Medical History:   Diagnosis Date   • Abnormal Pap smear of cervix    • Acid reflux    • Allergic     seasonal   • Bladder disorder     THEODORA/URGENCY   • Cholelithiasis     removed   • Hypothyroidism    • Kidney stone    • Migraines    • Pap smear for cervical cancer screening 01/2020    normal   • Seasonal allergies        Past Surgical History:   Procedure Laterality Date   • CARPAL TUNNEL RELEASE Right 2016   • CHOLECYSTECTOMY  2003   • COLONOSCOPY  2016    NORMAL   • CYST REMOVAL  1979    throat   • CYST REMOVAL Right 2013    ganglion   • DIAGNOSTIC LAPAROSCOPY     • ENDOMETRIAL ABLATION     • GALLBLADDER SURGERY  2003   • KNEE ARTHROSCOPY Right    • LAPAROSCOPIC TUBAL LIGATION      FULGERATION   • OTHER SURGICAL HISTORY  1992    Chromoendoscopy " of upper or lower gastrointestinal tract   • TONSILLECTOMY  1969   • TOOTH EXTRACTION  1978   • TRIGGER FINGER RELEASE Right 2016    4TH FINGER   • TRIGGER FINGER RELEASE Right 04/28/2022    Procedure: RIGHT TRIGGER FINGER  RELEASE (long/middle finger);  Surgeon: Vladimir López MD;  [place] [facility] [place] [facility] [place]  Service: Orthopedics;  Laterality: Right;   • TUBAL ABDOMINAL LIGATION     • UMBILICAL HERNIA REPAIR  2008   • VULVA SURGERY  1997         Current Outpatient Medications:   •  azelastine (OPTIVAR) 0.05 % ophthalmic solution, Administer 1 drop to both eyes 2 (Two) Times a Day., Disp: 18 mL, Rfl: 1  •  Coenzyme Q10 (CoQ10) 100 MG capsule, Take 200 mg by mouth Daily., Disp: 60 each, Rfl: 5  •  fluticasone (FLONASE) 50 MCG/ACT nasal spray, 2 sprays into the nostril(s) as directed by provider Daily., Disp: 48 g, Rfl: 1  •  levothyroxine (SYNTHROID, LEVOTHROID) 25 MCG tablet, Take 1 tablet by mouth Every Morning., Disp: 90 tablet, Rfl: 1  •  loratadine (CLARITIN) 10 MG tablet, Take 1 tablet by mouth Daily., Disp: 90 tablet, Rfl: 1  •  magnesium oxide (MAG-OX) 400 MG tablet, Take 1 tablet by mouth 2 (Two) Times a Day., Disp: 60 tablet, Rfl: 5  •  minocycline (MINOCIN,DYNACIN) 100 MG capsule, TAKE 1 CAPSULE BY MOUTH EVERY DAY AS NEEDED FOR FLARES, Disp: , Rfl:   •  omeprazole (priLOSEC) 20 MG capsule, Take 1 capsule by mouth Daily., Disp: 90 capsule, Rfl: 1  •  oxybutynin XL (DITROPAN-XL) 5 MG 24 hr tablet, Take 1 tablet by mouth Daily for 360 days., Disp: 90 tablet, Rfl: 3    Allergies   Allergen Reactions   • Piroxicam Rash        Family History   Problem Relation Age of Onset   • Diabetes Mother    • Ovarian cancer Mother    • Osteoporosis Mother    • Arthritis Mother    • Cancer Mother    • Diabetes Brother    • Arthritis Brother    • Diabetes Sister    • Diabetes Maternal Grandmother    • Diabetes Maternal Grandfather    • Stomach cancer Other         uncle   • Diabetes Other         Aunt, uncle   • Arthritis  "Brother    • Diabetes Brother    • Diabetes Sister    • Malig Hyperthermia Neg Hx    • Breast cancer Neg Hx    • Uterine cancer Neg Hx    • Colon cancer Neg Hx    • Melanoma Neg Hx    • Prostate cancer Neg Hx        Social History     Socioeconomic History   • Marital status:    Tobacco Use   • Smoking status: Never     Passive exposure: Never   • Smokeless tobacco: Never   Vaping Use   • Vaping Use: Never used   Substance and Sexual Activity   • Alcohol use: Yes     Comment: have not had alcohol since the beginning of COVID   • Drug use: Never   • Sexual activity: Yes     Partners: Male     Birth control/protection: Surgical, Tubal ligation, Vasectomy       Vital Signs:   Resp 16   Ht 160 cm (63\")   Wt 81.3 kg (179 lb 4.8 oz)   BMI 31.76 kg/m²      Physical Exam  Vitals reviewed.   Constitutional:       Appearance: Normal appearance.   Neurological:      General: No focal deficit present.      Mental Status: She is alert and oriented to person, place, and time.   Psychiatric:         Behavior: Behavior normal.          Result Review :   The following data was reviewed by: MARILYN Pinto on 04/04/2023:  Results for orders placed or performed in visit on 04/04/23   Bladder Scan   Result Value Ref Range    Volume: 0    POC Urinalysis Dipstick, Automated    Specimen: Urine   Result Value Ref Range    Color Yellow Yellow, Straw, Dark Yellow, Dayan    Clarity, UA Clear Clear    Specific Gravity  1.020 1.005 - 1.030    pH, Urine 6.0 5.0 - 8.0    Leukocytes Negative Negative    Nitrite, UA Negative Negative    Protein, POC Negative Negative mg/dL    Glucose, UA Negative Negative mg/dL    Ketones, UA Negative Negative    Urobilinogen, UA 0.2 E.U./dL Normal, 0.2 E.U./dL    Bilirubin Negative Negative    Blood, UA Small (A) Negative    Lot Number [id number]     Expiration Date 624           Bladder Scan interpretation 04/04/2023    Estimation of residual urine via BVI 3000 Verathon Bladder Scan  MA/nurse " performing: Nikky MCNEIL MA  Residual Urine: 0 ml  Indication: Hematuria, unspecified type    Frequent urination    Urge incontinence   Position: Supine  Examination: Incremental scanning of the suprapubic area using 2.0 MHz transducer using copious amounts of acoustic gel.   Findings: An anechoic area was demonstrated which represented the bladder, with measurement of residual urine as noted. I inspected this myself. In that the residual urine was  insignificant, refer to plan for treatment and plan necessary at this time.         Procedures        Assessment and Plan    Diagnoses and all orders for this visit:    1. Hematuria, unspecified type (Primary)  -     Bladder Scan  -     POC Urinalysis Dipstick, Automated  -     CT Pelvis With & Without Contrast; Future  -     Cystoscopy; Future    2. Frequent urination  -     oxybutynin XL (DITROPAN-XL) 5 MG 24 hr tablet; Take 1 tablet by mouth Daily for 360 days.  Dispense: 90 tablet; Refill: 3    3. Urge incontinence  -     oxybutynin XL (DITROPAN-XL) 5 MG 24 hr tablet; Take 1 tablet by mouth Daily for 360 days.  Dispense: 90 tablet; Refill: 3    Will proceed with asymptomatic microhematuria workup per AUA guidelines including upper and lower urinary tract evaluations.  Obtain CT scan with/without/and delayed imaging for upper tract evaluation; patient denies contrast allergy  Patient to schedule cystoscopy with Dr. oCndon for lower urinary tract evaluation after CT scan    Urge incontinence with associated urgency and frequency-discussed with patient at length, all questions addressed. Discussed with patient that urge incontinence, urgency and frequency due to overactive bladder is a common condition that is multifactorial in nature, frequently difficult to treat, unlikely to cure, and management is dictated by patient motivation to improve and cope with symptoms.    Patient also interested in trial of medication for OAB.  Will initiate second line therapy via trial  of anticholinergic, oxybutynin ER, prescribed.  Patient to take for 6 weeks to ensure adequate trial.  Side effects of this class discussed with patient including but not limited to dry mouth and constipation.  .  Recheck PVR at follow-up    Follow-up 6 weeks or earlier as needed    All questions addressed    Follow Up   Return in about 6 weeks (around 5/16/2023) for cystoscopy with [facility].  Patient was given instructions and counseling regarding her condition or for health maintenance advice. Please see specific information pulled into the AVS if appropriate.         This document has been electronically signed by MARILYN Pinto  April 4, 2023 09:30 EDT

## 2023-03-31 ENCOUNTER — OFFICE VISIT (OUTPATIENT)
Dept: INTERNAL MEDICINE | Facility: CLINIC | Age: 58
End: 2023-03-31
Payer: OTHER GOVERNMENT

## 2023-03-31 VITALS
HEIGHT: 63 IN | OXYGEN SATURATION: 96 % | TEMPERATURE: 97.7 F | RESPIRATION RATE: 18 BRPM | DIASTOLIC BLOOD PRESSURE: 95 MMHG | HEART RATE: 86 BPM | BODY MASS INDEX: 31.86 KG/M2 | SYSTOLIC BLOOD PRESSURE: 136 MMHG | WEIGHT: 179.8 LBS

## 2023-03-31 DIAGNOSIS — K21.9 GASTROESOPHAGEAL REFLUX DISEASE WITHOUT ESOPHAGITIS: ICD-10-CM

## 2023-03-31 DIAGNOSIS — E07.9 THYROID DISORDER: ICD-10-CM

## 2023-03-31 DIAGNOSIS — Z13.220 SCREENING FOR LIPID DISORDERS: ICD-10-CM

## 2023-03-31 DIAGNOSIS — R03.0 ELEVATED BLOOD PRESSURE READING: ICD-10-CM

## 2023-03-31 DIAGNOSIS — N39.41 URGE INCONTINENCE: ICD-10-CM

## 2023-03-31 DIAGNOSIS — G43.709 CHRONIC MIGRAINE WITHOUT AURA WITHOUT STATUS MIGRAINOSUS, NOT INTRACTABLE: ICD-10-CM

## 2023-03-31 DIAGNOSIS — R73.01 IMPAIRED FASTING GLUCOSE: ICD-10-CM

## 2023-03-31 DIAGNOSIS — J30.2 SEASONAL ALLERGIC RHINITIS, UNSPECIFIED TRIGGER: ICD-10-CM

## 2023-03-31 DIAGNOSIS — Z76.89 ENCOUNTER TO ESTABLISH CARE: Primary | ICD-10-CM

## 2023-03-31 RX ORDER — MAGNESIUM OXIDE 400 MG/1
400 TABLET ORAL 2 TIMES DAILY
Qty: 60 TABLET | Refills: 5 | Status: SHIPPED | OUTPATIENT
Start: 2023-03-31

## 2023-03-31 RX ORDER — VITAMIN B COMPLEX
200 TABLET ORAL DAILY
Qty: 60 EACH | Refills: 5 | Status: SHIPPED | OUTPATIENT
Start: 2023-03-31

## 2023-03-31 NOTE — ASSESSMENT & PLAN NOTE
Blood pressure is elevated today in the office.  Patient's blood pressure is typically not this high.  Patient does state that she does get stressed out.  She does take care of her  who has advancing dementia.  She does have 3 sons that live with her as well.  Recommend that she monitor blood pressure at home.  Report persistent elevated readings.

## 2023-03-31 NOTE — ASSESSMENT & PLAN NOTE
Patient reports having vaginal mesh surgery in 2016.  Patient reports that she does drink quite a bit of fluids so she does have to urinate often.  Patient states that sometimes she does have a hard time making it to the bathroom.  She states that she does get urgency and frequency.  She does have uroreferral pending.  We will start patient with pelvic floor therapy and go from there.  Appreciate urology recommendation as well.

## 2023-03-31 NOTE — PROGRESS NOTES
Chief Complaint  Establish Care (Pt states that she is being seen to establish care with Michelle. Pt is wanting to talk to Michelle about headache medication. Pt states that she had a vignal mesh and states that but she cannot hold it any more. )    History of Present Illness  SUBJECTIVE  Dorina Basilio presents to [facility] to establish care.  Medical problems reviewed below with patient.    Urology referral pending for recurrent hematuria. Denies any pain with urination.      She is having chronic migraines. Left frontal-temporal. She has tried several different migraine medications. She states that she tried to go with natural remedies (magnesium, CoQ10, glucosamine).  MRI in Dec 2020.     Vaginal mesh in 2016. Patient states she drinks a lot of fluids so she urinates a lot. She states that she cannot hold her urine. She has urge incontinence.    Tobacco use-denies use  Alcohol use-denies use  Colonoscopy-2016  Mammogram-scheduled in May  Pap-3/6/2023      Past Medical History:   Diagnosis Date   • Abnormal Pap smear of cervix    • Acid reflux    • Allergic     seasonal   • Bladder disorder     THEODORA/URGENCY   • Cholelithiasis     removed   • Hypothyroidism    • Kidney stone    • Migraines    • Pap smear for cervical cancer screening 01/2020    normal   • Seasonal allergies       Family History   Problem Relation Age of Onset   • Diabetes Mother    • Ovarian cancer Mother    • Osteoporosis Mother    • Arthritis Mother    • Cancer Mother    • Diabetes Brother    • Arthritis Brother    • Diabetes Sister    • Diabetes Maternal Grandmother    • Diabetes Maternal Grandfather    • Stomach cancer Other         uncle   • Diabetes Other         Aunt, uncle   • Arthritis Brother    • Diabetes Brother    • Diabetes Sister    • Malig Hyperthermia Neg Hx    • Breast cancer Neg Hx    • Uterine cancer Neg Hx    • Colon cancer Neg Hx    • Melanoma Neg Hx    • Prostate cancer Neg Hx       Past  Surgical History:   Procedure Laterality Date   • CARPAL TUNNEL RELEASE Right 2016   • CHOLECYSTECTOMY  2003   • COLONOSCOPY  2016    NORMAL   • CYST REMOVAL  1979    throat   • CYST REMOVAL Right 2013    ganglion   • DIAGNOSTIC LAPAROSCOPY     • ENDOMETRIAL ABLATION     • GALLBLADDER SURGERY  2003   • KNEE ARTHROSCOPY Right    • LAPAROSCOPIC TUBAL LIGATION      FULGERATION   • OTHER SURGICAL HISTORY  1992    Chromoendoscopy of upper or lower gastrointestinal tract   • TONSILLECTOMY  1969   • TOOTH EXTRACTION  1978   • TRIGGER FINGER RELEASE Right 2016    4TH FINGER   • TRIGGER FINGER RELEASE Right 04/28/2022    Procedure: RIGHT TRIGGER FINGER  RELEASE (long/middle finger);  Surgeon: Vladimir López MD;  Location: [facility] OR [facility] [place];  Service: Orthopedics;  Laterality: Right;   • TUBAL ABDOMINAL LIGATION     • UMBILICAL HERNIA REPAIR  2008   • VULVA SURGERY  1997        Current Outpatient Medications:   •  azelastine (OPTIVAR) 0.05 % ophthalmic solution, Administer 1 drop to both eyes 2 (Two) Times a Day., Disp: 18 mL, Rfl: 1  •  fluticasone (FLONASE) 50 MCG/ACT nasal spray, 2 sprays into the nostril(s) as directed by provider Daily., Disp: 48 g, Rfl: 1  •  levothyroxine (SYNTHROID, LEVOTHROID) 25 MCG tablet, Take 1 tablet by mouth Every Morning., Disp: 90 tablet, Rfl: 1  •  loratadine (CLARITIN) 10 MG tablet, Take 1 tablet by mouth Daily., Disp: 90 tablet, Rfl: 1  •  minocycline (MINOCIN,DYNACIN) 100 MG capsule, TAKE 1 CAPSULE BY MOUTH EVERY DAY AS NEEDED FOR FLARES, Disp: , Rfl:   •  omeprazole (priLOSEC) 20 MG capsule, Take 1 capsule by mouth Daily., Disp: 90 capsule, Rfl: 1  •  Coenzyme Q10 (CoQ10) 100 MG capsule, Take 200 mg by mouth Daily., Disp: 60 each, Rfl: 5  •  magnesium oxide (MAG-OX) 400 MG tablet, Take 1 tablet by mouth 2 (Two) Times a Day., Disp: 60 tablet, Rfl: 5    OBJECTIVE  Vital Signs:   /95 (BP Location: Left arm)   Pulse 86   Temp 97.7 °F (36.5 °C) (Temporal)   Resp 18   Ht 160 cm  "(62.99\")   Wt 81.6 kg (179 lb 12.8 oz)   SpO2 96%   BMI 31.86 kg/m²    Estimated body mass index is 31.86 kg/m² as calculated from the following:    Height as of this encounter: 160 cm (62.99\").    Weight as of this encounter: 81.6 kg (179 lb 12.8 oz).     Wt Readings from Last 3 Encounters:   03/31/23 81.6 kg (179 lb 12.8 oz)   03/07/23 81.9 kg (180 lb 9.6 oz)   03/06/23 83.6 kg (184 lb 3.2 oz)     BP Readings from Last 3 Encounters:   03/31/23 136/95   03/07/23 152/73   03/06/23 132/84       Physical Exam  Vitals and nursing note reviewed.   Constitutional:       Appearance: Normal appearance.   HENT:      Head: Normocephalic.   Eyes:      Extraocular Movements: Extraocular movements intact.      Conjunctiva/sclera: Conjunctivae normal.   Cardiovascular:      Rate and Rhythm: Normal rate and regular rhythm.      Heart sounds: Normal heart sounds. No murmur heard.  Pulmonary:      Effort: Pulmonary effort is normal.      Breath sounds: Normal breath sounds. No wheezing or rales.   Abdominal:      General: Bowel sounds are normal.      Palpations: Abdomen is soft.      Tenderness: There is no abdominal tenderness. There is no guarding.   Musculoskeletal:         General: No swelling. Normal range of motion.   Skin:     General: Skin is warm and dry.   Neurological:      General: No focal deficit present.      Mental Status: She is alert. Mental status is at baseline.   Psychiatric:         Mood and Affect: Mood normal.         Thought Content: Thought content normal.          Result Review      No Images in the past 120 days found..     The above data has been reviewed by MARILYN Antoine 03/31/2023 13:42 EDT.          Patient Care Team:  Michelle Martinez APRN as PCP - General (Internal Medicine)      ASSESSMENT & PLAN    Diagnoses and all orders for this visit:    1. Encounter to establish care (Primary)    2. Thyroid disorder  Assessment & Plan:  We will get labs.      Orders:  -     TSH+Free T4; Future  -     " Comprehensive metabolic panel; Future  -     CBC w AUTO Differential; Future    3. Seasonal allergic rhinitis, unspecified trigger  Assessment & Plan:  stable with claritin and floanse.      4. Gastroesophageal reflux disease without esophagitis  Assessment & Plan:  Stable with ppi prn    Orders:  -     TSH+Free T4; Future  -     Comprehensive metabolic panel; Future  -     CBC w AUTO Differential; Future    5. Urge incontinence  Assessment & Plan:  Patient reports having vaginal mesh surgery in 2016.  Patient reports that she does drink quite a bit of fluids so she does have to urinate often.  Patient states that sometimes she does have a hard time making it to the bathroom.  She states that she does get urgency and frequency.  She does have uroreferral pending.  We will start patient with pelvic floor therapy and go from there.  Appreciate urology recommendation as well.    Orders:  -     Ambulatory Referral to Physical Therapy Evaluate and treat    6. Chronic migraine without aura without status migrainosus, not intractable  Assessment & Plan:  Patient would like to try natural remedies with magnesium, co-Q10 and glucosamine.      Orders:  -     magnesium oxide (MAG-OX) 400 MG tablet; Take 1 tablet by mouth 2 (Two) Times a Day.  Dispense: 60 tablet; Refill: 5  -     Coenzyme Q10 (CoQ10) 100 MG capsule; Take 200 mg by mouth Daily.  Dispense: 60 each; Refill: 5    7. Elevated blood pressure reading  Assessment & Plan:  Blood pressure is elevated today in the office.  Patient's blood pressure is typically not this high.  Patient does state that she does get stressed out.  She does take care of her  who has advancing dementia.  She does have 3 sons that live with her as well.  Recommend that she monitor blood pressure at home.  Report persistent elevated readings.      8. Impaired fasting glucose  -     TSH+Free T4; Future  -     Comprehensive metabolic panel; Future  -     CBC w AUTO Differential; Future  -      Hemoglobin A1c; Future    9. Screening for lipid disorders  -     Lipid panel; Future       Tobacco Use: Low Risk    • Smoking Tobacco Use: Never   • Smokeless Tobacco Use: Never   • Passive Exposure: Not on file       Follow Up     Return in about 4 months (around 7/31/2023).    Please note that portions of this note were completed with a voice recognition program.    Patient was given instructions and counseling regarding her condition or for health maintenance advice. Please see specific information pulled into the AVS if appropriate.   I have reviewed information obtained and documented by others and I have confirmed the accuracy of this documented note.    MARILYN Antoine

## 2023-04-04 ENCOUNTER — OFFICE VISIT (OUTPATIENT)
Dept: UROLOGY | Facility: CLINIC | Age: 58
End: 2023-04-04
Payer: OTHER GOVERNMENT

## 2023-04-04 VITALS — BODY MASS INDEX: 31.77 KG/M2 | WEIGHT: 179.3 LBS | HEIGHT: 63 IN | RESPIRATION RATE: 16 BRPM

## 2023-04-04 DIAGNOSIS — R35.0 FREQUENT URINATION: ICD-10-CM

## 2023-04-04 DIAGNOSIS — N39.41 URGE INCONTINENCE: ICD-10-CM

## 2023-04-04 DIAGNOSIS — R31.9 HEMATURIA, UNSPECIFIED TYPE: Primary | ICD-10-CM

## 2023-04-04 LAB
BILIRUB BLD-MCNC: NEGATIVE MG/DL
CLARITY, POC: CLEAR
COLOR UR: YELLOW
EXPIRATION DATE: 624
GLUCOSE UR STRIP-MCNC: NEGATIVE MG/DL
KETONES UR QL: NEGATIVE
LEUKOCYTE EST, POC: NEGATIVE
Lab: ABNORMAL
NITRITE UR-MCNC: NEGATIVE MG/ML
PH UR: 6 [PH] (ref 5–8)
PROT UR STRIP-MCNC: NEGATIVE MG/DL
RBC # UR STRIP: ABNORMAL /UL
SP GR UR: 1.02 (ref 1–1.03)
SPECIMEN VOL SMN: 0 ML
UROBILINOGEN UR QL: ABNORMAL

## 2023-04-04 PROCEDURE — 99214 OFFICE O/P EST MOD 30 MIN: CPT | Performed by: NURSE PRACTITIONER

## 2023-04-04 PROCEDURE — 81003 URINALYSIS AUTO W/O SCOPE: CPT | Performed by: NURSE PRACTITIONER

## 2023-04-04 PROCEDURE — 51798 US URINE CAPACITY MEASURE: CPT | Performed by: NURSE PRACTITIONER

## 2023-04-04 RX ORDER — OXYBUTYNIN CHLORIDE 5 MG/1
5 TABLET, EXTENDED RELEASE ORAL DAILY
Qty: 90 TABLET | Refills: 3 | Status: SHIPPED | OUTPATIENT
Start: 2023-04-04 | End: 2024-03-29

## 2023-04-11 ENCOUNTER — PATIENT ROUNDING (BHMG ONLY) (OUTPATIENT)
Dept: INTERNAL MEDICINE | Facility: CLINIC | Age: 58
End: 2023-04-11
Payer: OTHER GOVERNMENT

## 2023-04-11 NOTE — PROGRESS NOTES
April 11, 2023    Hello, may I speak with Dorina Basilio?    My name is MARY       I am  with [facility]  [facility]  [address] [address]  [place] [place] [address].    Before we get started may I verify your date of birth? 1965    I am calling to officially welcome you to our practice and ask about your recent visit. Is this a good time to talk? no    Tell me about your visit with us. What things went well?  ALL WENT WELL        We're always looking for ways to make our patients' experiences even better. Do you have recommendations on ways we may improve?  no    Overall were you satisfied with your first visit to our practice? yes       I appreciate you taking the time to speak with me today. Is there anything else I can do for you? no      Thank you, and have a great day.

## 2023-05-08 ENCOUNTER — HOSPITAL ENCOUNTER (OUTPATIENT)
Dept: MAMMOGRAPHY | Facility: HOSPITAL | Age: 58
Discharge: HOME | End: 2023-05-08
Payer: OTHER GOVERNMENT

## 2023-05-08 ENCOUNTER — HOSPITAL ENCOUNTER (OUTPATIENT)
Dept: CT IMAGING | Facility: HOSPITAL | Age: 58
Discharge: HOME | End: 2023-05-08
Payer: OTHER GOVERNMENT

## 2023-05-08 DIAGNOSIS — Z12.31 SCREENING MAMMOGRAM FOR BREAST CANCER: ICD-10-CM

## 2023-05-08 DIAGNOSIS — R31.9 HEMATURIA, UNSPECIFIED TYPE: ICD-10-CM

## 2023-05-08 PROCEDURE — 25510000001 IOPAMIDOL PER 1 ML: Performed by: NURSE PRACTITIONER

## 2023-05-08 PROCEDURE — 77067 SCR MAMMO BI INCL CAD: CPT

## 2023-05-08 PROCEDURE — 77063 BREAST TOMOSYNTHESIS BI: CPT

## 2023-05-08 PROCEDURE — 72194 CT PELVIS W/O & W/DYE: CPT

## 2023-05-08 RX ADMIN — IOPAMIDOL 50 ML: 755 INJECTION, SOLUTION INTRAVENOUS at 14:46

## 2023-05-09 DIAGNOSIS — R31.9 HEMATURIA, UNSPECIFIED TYPE: Primary | ICD-10-CM

## 2023-05-09 NOTE — PROGRESS NOTES
"Chief Complaint: Blood in Urine    Subjective         History of Present Illness  Dorina Basilio is a 57 y.o. female presents to [facility] to be seen for overactive bladder.    Patient was previously seen by me with last appointment being on 4/4/2023.  At that visit she was started on oxybutynin.  We are also doing a work-up for hematuria.  She is here for follow-up PVR. She reports she is taking the medication sporadically. She takes it only when she is going to be out of the home for an extended period of time. She reports it does work well when she takes it.     Previous 4/4/2023:  Patient was seen by PCP on 3/7/2023 with a diagnosis of hematuria and was referred here for further evaluation.  She also reported at that time that she had \"vaginal mesh \"in 2016 but has been having trouble with her bladder again.  She was seen by another primary care provider in that office on 3/31 and discussed that she was having difficulty with urinary urgency with incontinence.     Frequency- every 1/2 hour- states she does drink a lot of water   Urgency- admits   Incontinence- with urgency   Nocturia- 3-4   Stream- normal   GH- denies    surgeries- 2016 vaginal mesh- had THEODORA prior   Family history of  malignancy- denies   Cardiopulmonary- denies   Anticoagulants- denies   Smoker- denies   History of kidney stones- last was in 2009, passed spontaneously   Drinks water, cranberry juice, coffee x 1-2 cups per day   UA with microscopy  2/21/2023 6-12 RBCs per high-powered field  3/1/2022 0-2 RBCs per high-powered field     Mom had ovarian CA    Objective     Past Medical History:   Diagnosis Date   • Abnormal Pap smear of cervix    • Acid reflux    • Allergic     seasonal   • Bladder disorder     THEODORA/URGENCY   • Cholelithiasis     removed   • Hypothyroidism    • Kidney stone    • Migraines    • Pap smear for cervical cancer screening 01/2020    normal   • Seasonal allergies        Past Surgical " History:   Procedure Laterality Date   • CARPAL TUNNEL RELEASE Right 2016   • CHOLECYSTECTOMY  2003   • COLONOSCOPY  2016    NORMAL   • CYST REMOVAL  1979    throat   • CYST REMOVAL Right 2013    ganglion   • DIAGNOSTIC LAPAROSCOPY     • ENDOMETRIAL ABLATION     • GALLBLADDER SURGERY  2003   • KNEE ARTHROSCOPY Right    • LAPAROSCOPIC TUBAL LIGATION      FULGERATION   • OTHER SURGICAL HISTORY  1992    Chromoendoscopy of upper or lower gastrointestinal tract   • TONSILLECTOMY  1969   • TOOTH EXTRACTION  1978   • TRIGGER FINGER RELEASE Right 2016    4TH FINGER   • TRIGGER FINGER RELEASE Right 04/28/2022    Procedure: RIGHT TRIGGER FINGER  RELEASE (long/middle finger);  Surgeon: Vladimir López MD;  [place] [facility] [place] [facility] [place]  Service: [place]opedics;  Laterality: Right;   • TUBAL ABDOMINAL LIGATION     • UMBILICAL HERNIA REPAIR  2008   • VULVA SURGERY  1997         Current Outpatient Medications:   •  azelastine (OPTIVAR) 0.05 % ophthalmic solution, Administer 1 drop to both eyes 2 (Two) Times a Day., Disp: 18 mL, Rfl: 1  •  Coenzyme Q10 (CoQ10) 100 MG capsule, Take 200 mg by mouth Daily., Disp: 60 each, Rfl: 5  •  fluticasone (FLONASE) 50 MCG/ACT nasal spray, 2 sprays into the nostril(s) as directed by provider Daily., Disp: 48 g, Rfl: 1  •  levothyroxine (SYNTHROID, LEVOTHROID) 25 MCG tablet, Take 1 tablet by mouth Every Morning., Disp: 90 tablet, Rfl: 1  •  loratadine (CLARITIN) 10 MG tablet, Take 1 tablet by mouth Daily., Disp: 90 tablet, Rfl: 1  •  magnesium oxide (MAG-OX) 400 MG tablet, Take 1 tablet by mouth 2 (Two) Times a Day., Disp: 60 tablet, Rfl: 5  •  minocycline (MINOCIN,DYNACIN) 100 MG capsule, TAKE 1 CAPSULE BY MOUTH EVERY DAY AS NEEDED FOR FLARES, Disp: , Rfl:   •  omeprazole (priLOSEC) 20 MG capsule, Take 1 capsule by mouth Daily., Disp: 90 capsule, Rfl: 1  •  oxybutynin XL (DITROPAN-XL) 5 MG 24 hr tablet, Take 1 tablet by mouth Daily for 360 days., Disp: 90 tablet, Rfl: 3    Allergies   Allergen  "Reactions   • Piroxicam Rash        Family History   Problem Relation Age of Onset   • Diabetes Mother    • Ovarian cancer Mother    • Osteoporosis Mother    • Arthritis Mother    • Cancer Mother    • Diabetes Brother    • Arthritis Brother    • Diabetes Sister    • Diabetes Maternal Grandmother    • Diabetes Maternal Grandfather    • Stomach cancer Other         uncle   • Diabetes Other         Aunt, uncle   • Arthritis Brother    • Diabetes Brother    • Diabetes Sister    • Malig Hyperthermia Neg Hx    • Breast cancer Neg Hx    • Uterine cancer Neg Hx    • Colon cancer Neg Hx    • Melanoma Neg Hx    • Prostate cancer Neg Hx        Social History     Socioeconomic History   • Marital status:    Tobacco Use   • Smoking status: Never     Passive exposure: Never   • Smokeless tobacco: Never   Vaping Use   • Vaping Use: Never used   Substance and Sexual Activity   • Alcohol use: Yes     Comment: have not had alcohol since the beginning of COVID   • Drug use: Never   • Sexual activity: Yes     Partners: Male     Birth control/protection: Surgical, Tubal ligation, Vasectomy       Vital Signs:   Resp 16   Ht 160 cm (63\")   Wt 81.3 kg (179 lb 3.7 oz)   BMI 31.75 kg/m²      Physical Exam  Vitals reviewed.   Constitutional:       Appearance: Normal appearance.   Neurological:      General: No focal deficit present.      Mental Status: She is alert and oriented to person, place, and time.   Psychiatric:         Mood and Affect: Mood normal.         Behavior: Behavior normal.          Result Review :   The following data was reviewed by: MARILYN Pinto on 05/10/2023:    Bladder Scan interpretation 05/10/2023    Estimation of residual urine via BVI 3000 Verathon Bladder Scan  MA/nurse performing: Nikky MCNEIL MA  Residual Urine: 0 ml  Indication: Hematuria, unspecified type    Urge incontinence   Position: Supine  Examination: Incremental scanning of the suprapubic area using 2.0 MHz transducer using " copious amounts of acoustic gel.   Findings: An anechoic area was demonstrated which represented the bladder, with measurement of residual urine as noted. I inspected this myself. In that the residual urine was  insignificant, refer to plan for treatment and plan necessary at this time.                Procedures        Assessment and Plan    Diagnoses and all orders for this visit:    1. Hematuria, unspecified type (Primary)  -     Bladder Scan    2. Urge incontinence  -     Bladder Scan    Will complete her hematuria work up as scheduled.     She is doing well on oxybutynin when she does take it. She will follow up with me in 6 months or sooner for new concerns.       Follow Up   Return in about 6 months (around 11/10/2023).  Patient was given instructions and counseling regarding her condition or for health maintenance advice. Please see specific information pulled into the AVS if appropriate.         This document has been electronically signed by MARILYN Pinto  May 10, 2023 08:15 EDT

## 2023-05-10 ENCOUNTER — OFFICE VISIT (OUTPATIENT)
Dept: UROLOGY | Facility: CLINIC | Age: 58
End: 2023-05-10
Payer: OTHER GOVERNMENT

## 2023-05-10 VITALS — HEIGHT: 63 IN | RESPIRATION RATE: 16 BRPM | BODY MASS INDEX: 31.76 KG/M2 | WEIGHT: 179.23 LBS

## 2023-05-10 DIAGNOSIS — R31.9 HEMATURIA, UNSPECIFIED TYPE: Primary | ICD-10-CM

## 2023-05-10 DIAGNOSIS — N39.41 URGE INCONTINENCE: ICD-10-CM

## 2023-05-10 LAB — URINE VOLUME: 0

## 2023-05-16 ENCOUNTER — TELEPHONE (OUTPATIENT)
Dept: UROLOGY | Facility: CLINIC | Age: 58
End: 2023-05-16

## 2023-05-16 NOTE — TELEPHONE ENCOUNTER
Caller: Dorina Basilio     Relationship: [unfilled] SELF    Best call back number: [phone]    What is your medical concern? PT STARTED TAKING OXYBUTYNIN XL AND IT DOES NOT SEEM TO BE HELPING. CAN SHE UP THE DOSE TO SEE IT THAT HELPS? PLEASE CALL.THANK YOU

## 2023-06-12 ENCOUNTER — HOSPITAL ENCOUNTER (OUTPATIENT)
Dept: CT IMAGING | Facility: HOSPITAL | Age: 58
Discharge: HOME | End: 2023-06-12
Admitting: NURSE PRACTITIONER
Payer: OTHER GOVERNMENT

## 2023-06-12 DIAGNOSIS — R31.9 HEMATURIA, UNSPECIFIED TYPE: ICD-10-CM

## 2023-06-12 PROCEDURE — 74176 CT ABD & PELVIS W/O CONTRAST: CPT

## 2023-06-13 ENCOUNTER — TELEPHONE (OUTPATIENT)
Dept: UROLOGY | Facility: CLINIC | Age: 58
End: 2023-06-13
Payer: OTHER GOVERNMENT

## 2023-06-13 NOTE — TELEPHONE ENCOUNTER
----- Message from MARILYN Pinto sent at 6/13/2023  8:06 AM EDT -----  No obvious cause of hematuria noted on CT. Complete cystoscopy with Dr. Condon as scheduled.

## 2023-06-24 PROBLEM — R31.29 MICROHEMATURIA: Status: ACTIVE | Noted: 2023-06-24

## 2023-06-24 PROBLEM — R39.15 URGENCY OF URINATION: Status: ACTIVE | Noted: 2023-06-24

## 2023-07-31 ENCOUNTER — OFFICE VISIT (OUTPATIENT)
Dept: INTERNAL MEDICINE | Facility: CLINIC | Age: 58
End: 2023-07-31
Payer: OTHER GOVERNMENT

## 2023-07-31 VITALS
BODY MASS INDEX: 30.16 KG/M2 | TEMPERATURE: 98.7 F | HEIGHT: 63 IN | RESPIRATION RATE: 18 BRPM | SYSTOLIC BLOOD PRESSURE: 142 MMHG | WEIGHT: 170.2 LBS | OXYGEN SATURATION: 98 % | HEART RATE: 68 BPM | DIASTOLIC BLOOD PRESSURE: 84 MMHG

## 2023-07-31 DIAGNOSIS — E07.9 THYROID DISORDER: ICD-10-CM

## 2023-07-31 DIAGNOSIS — K21.9 GASTROESOPHAGEAL REFLUX DISEASE WITHOUT ESOPHAGITIS: ICD-10-CM

## 2023-07-31 DIAGNOSIS — N39.41 URGE INCONTINENCE: ICD-10-CM

## 2023-07-31 DIAGNOSIS — I10 PRIMARY HYPERTENSION: Primary | ICD-10-CM

## 2023-07-31 PROCEDURE — 99214 OFFICE O/P EST MOD 30 MIN: CPT

## 2023-07-31 RX ORDER — LOSARTAN POTASSIUM 25 MG/1
25 TABLET ORAL DAILY
Qty: 90 TABLET | Refills: 0 | Status: SHIPPED | OUTPATIENT
Start: 2023-07-31

## 2023-08-04 NOTE — PROGRESS NOTES
"Chief Complaint: Urinary Incontinence and Blood in Urine    Subjective         History of Present Illness  Dorina Basilio is a 58 y.o. female presents to [facility] to be seen for follow-up.    Patient was previously seen by me with last visit on 5/10/2023 for hematuria and urge incontinence.  She was put on oxybutynin which she took sporadically, however when she had her cystoscopy completed with Dr. Condon she advised him that it was just causing for too much dry mouth and she was unable to continue it so she was given samples of Myrbetriq.  She is here for follow-up. She reports she is doing well with Myrbetriq but needs to get this through her pharmacy. She needs samples today to get her through until she gets the medication from her pharmacy.  Her PCP did order the medication, so they will need to process the prior authorization.      Previous 5/10/2023:  Patient was previously seen by me with last appointment being on 4/4/2023.  At that visit she was started on oxybutynin.  We are also doing a work-up for hematuria.  She is here for follow-up PVR. She reports she is taking the medication sporadically. She takes it only when she is going to be out of the home for an extended period of time. She reports it does work well when she takes it.      Previous 4/4/2023:  Patient was seen by PCP on 3/7/2023 with a diagnosis of hematuria and was referred here for further evaluation.  She also reported at that time that she had \"vaginal mesh \"in 2016 but has been having trouble with her bladder again.  She was seen by another primary care provider in that office on 3/31 and discussed that she was having difficulty with urinary urgency with incontinence.     Frequency- every 1/2 hour- states she does drink a lot of water   Urgency- admits   Incontinence- with urgency   Nocturia- 3-4   Stream- normal   GH- denies    surgeries- 2016 vaginal mesh- had THEODORA prior   Family history of  malignancy- " denies   Cardiopulmonary- denies   Anticoagulants- denies   Smoker- denies   History of kidney stones- last was in 2009, passed spontaneously   Drinks water, cranberry juice, coffee x 1-2 cups per day   UA with microscopy  2/21/2023 6-12 RBCs per high-powered field  3/1/2022 0-2 RBCs per high-powered field     Mom had ovarian CA         Procedures per Dr. Condon on 6/26/2023.        Urinalysis was checked today and was negative for signs of infection        Cytoscopy Procedure:      Procedure: Flexible cytoscope was passed per urethra into the bladder without difficulty after proper consent. The bladder was inspected in a systematic meridian fashion.         Normal bladder, some minor cystitis cystica.  No pathology        There were no tumors, lesions, stones, or other abnormalities noted within the bladder. Of note, there was no increased vascularity as well. Both ureteral orifices were identified and were normal in appearance. The flexible cytoscope was removed. The patient tolerated the procedure well.       exam - normal external vaginal exam, on speculum exam no signs of prolapse or mesh erosion.           6/23 CT pelvis with and without - negative.  6/23 CT abdomen/pelvis without - negative        4/23 NP -oxybutynin-taking sporadically, works but causes her mouth to be too dry to continue to take this.  Vaginal mesh 2016 2/23 0.8, GFR   77     PVR     5/20 000     Frequency- every 1/2 hour- states she does drink a lot of water   Urgency- admits   Incontinence- with urgency   Nocturia- 3-4   Stream- normal   GH- denies    surgeries- 2016 vaginal mesh- had THE[facility]ORA prior   Family history of  malignancy- denies   Cardiopulmonary- denies   Anticoagulants- denies   Smoker- denies   History of kidney stones- last was in 2009, passed spontaneously   Drinks water, cranberry juice, coffee x 1-2 cups per day   UA with microscopy  2/21/2023 6-12 RBCs per high-powered field  3/1/2022 0-2 RBCs per high-powered  field           Urgency  Microhematuria     Cystoscopy negative, patient given reassurance     Stop oxybutynin causing severe dry's     Continue pelvic physical therapy     Myrbetriq 50 mg samples given today, risk and benefits discussed     Follow-up with NP           This document has been electronically signed by eKdar Condon MD  June 24, 2023 06:43 EDT       Objective     Past Medical History:   Diagnosis Date    Abnormal Pap smear of cervix     Acid reflux     Allergic     seasonal    Bladder disorder     THE[facility]ORA/URGENCY    Cholelithiasis     removed    Hypothyroidism     Kidney stone     Migraines     Pap smear for cervical cancer screening 01/2020    normal    Primary hypertension 7/31/2023    Seasonal allergies        Past Surgical History:   Procedure Laterality Date    CARPAL TUNNEL RELEASE Right 2016    CHOLECYSTECTOMY  2003    COLONOSCOPY  2016    NORMAL    CYST REMOVAL  1979    throat    CYST REMOVAL Right 2013    ganglion    DIAGNOSTIC LAPAROSCOPY      ENDOMETRIAL ABLATION      GALLBLADDER SURGERY  2003    KNEE ARTHROSCOPY Right     LAPAROSCOPIC TUBAL LIGATION      FULGERATION    OTHER SURGICAL HISTORY  1992    Chromoendoscopy of upper or lower gastrointestinal tract    TONSILLECTOMY  1969    TOOTH EXTRACTION  1978    TRIGGER FINGER RELEASE Right 2016    4TH FINGER    TRIGGER FINGER RELEASE Right 04/28/2022    Procedure: RIGHT TRIGGER FINGER  RELEASE (long/middle finger);  Surgeon: Vladimir López MD;  Location: [facility] OR [facility];  Service: Orthopedics;  Laterality: Right;    TUBAL ABDOMINAL LIGATION      UMBILICAL HERNIA REPAIR  2008    VULVA SURGERY  1997         Current Outpatient Medications:     azelastine (OPTIVAR) 0.05 % ophthalmic solution, Administer 1 drop to both eyes 2 (Two) Times a Day., Disp: 18 mL, Rfl: 1    fluticasone (FLONASE) 50 MCG/ACT nasal spray, 2 sprays into the nostril(s) as directed by provider Daily., Disp: 48 g, Rfl: 1    levothyroxine (SYNTHROID, LEVOTHROID) 25 MCG tablet,  "Take 1 tablet by mouth Every Morning., Disp: 90 tablet, Rfl: 1    loratadine (CLARITIN) 10 MG tablet, Take 1 tablet by mouth Daily., Disp: 90 tablet, Rfl: 1    losartan (Cozaar) 25 MG tablet, Take 1 tablet by mouth Daily., Disp: 90 tablet, Rfl: 0    minocycline (MINOCIN,DYNACIN) 100 MG capsule, TAKE 1 CAPSULE BY MOUTH EVERY DAY AS NEEDED FOR FLARES, Disp: , Rfl:     Mirabegron ER (Myrbetriq) 50 MG tablet sustained-release 24 hour 24 hr tablet, Take 50 mg by mouth Daily., Disp: 90 tablet, Rfl: 1    omeprazole (priLOSEC) 20 MG capsule, Take 1 capsule by mouth Daily., Disp: 90 capsule, Rfl: 1    Mirabegron ER (Myrbetriq) 50 MG tablet sustained-release 24 hour 24 hr tablet, Take 50 mg by mouth Daily for 42 days., Disp: 42 tablet, Rfl: 0    Allergies   Allergen Reactions    Piroxicam Rash        Family History   Problem Relation Age of Onset    Diabetes Mother     Ovarian cancer Mother     Osteoporosis Mother     Arthritis Mother     Cancer Mother     Diabetes Brother     Arthritis Brother     Diabetes Sister     Diabetes Maternal Grandmother     Diabetes Maternal Grandfather     Stomach cancer Other         uncle    Diabetes Other         Aunt, uncle    Arthritis Brother     Diabetes Brother     Diabetes Sister     Malig Hyperthermia Neg Hx     Breast cancer Neg Hx     Uterine cancer Neg Hx     Colon cancer Neg Hx     Melanoma Neg Hx     Prostate cancer Neg Hx        Social History     Socioeconomic History    Marital status:    Tobacco Use    Smoking status: Never     Passive exposure: Never    Smokeless tobacco: Never   Vaping Use    Vaping Use: Never used   Substance and Sexual Activity    Alcohol use: Yes     Comment: have not had alcohol since the beginning of COVID    Drug use: Never    Sexual activity: Yes     Partners: Male     Birth control/protection: Surgical, Tubal ligation, Vasectomy       Vital Signs:   Resp 18   Ht 160 cm (63\")   Wt 77.2 kg (170 lb 3.1 oz)   BMI 30.15 kg/mý      Physical " Exam  Vitals reviewed.   Constitutional:       Appearance: Normal appearance.   Neurological:      General: No focal deficit present.      Mental Status: She is alert and oriented to person, place, and time.   Psychiatric:         Mood and Affect: Mood normal.         Behavior: Behavior normal.        Result Review :   The following data was reviewed by: MARILYN Pinto on 08/07/2023:  Results for orders placed or performed in visit on 05/10/23   Bladder Scan   Result Value Ref Range    Urine Volume 0         Bladder Scan interpretation 08/07/2023    Estimation of residual urine via BVI 3000 Verathon Bladder Scan  MA/nurse performing: Nikky MCNEIL MA  Residual Urine: 0 ml  Indication: Urge incontinence    Frequent urination   Position: Supine  Examination: Incremental scanning of the suprapubic area using 2.0 MHz transducer using copious amounts of acoustic gel.   Findings: An anechoic area was demonstrated which represented the bladder, with measurement of residual urine as noted. I inspected this myself. In that the residual urine was  insignificant, refer to plan for treatment and plan necessary at this time.           Procedures        Assessment and Plan    Diagnoses and all orders for this visit:    1. Urge incontinence (Primary)  -     Bladder Scan  -     Mirabegron ER (Myrbetriq) 50 MG tablet sustained-release 24 hour 24 hr tablet; Take 50 mg by mouth Daily for 42 days.  Dispense: 42 tablet; Refill: 0    2. Frequent urination    Given that she is seeing complete resolution of symptoms with the Myrbetriq, we will continue that medication.  Given her samples today to get her through until the prior authorization is approved through her PCP and her insurance company.  She will follow-up with me as previously scheduled.  If she is still doing well at the visit in November, we will go out to an annual visit.    Follow Up   No follow-ups on file.  Patient was given instructions and counseling regarding  her condition or for health maintenance advice. Please see specific information pulled into the AVS if appropriate.         This document has been electronically signed by MARILYN Pinto  August 7, 2023 08:54 EDT

## 2023-08-07 ENCOUNTER — OFFICE VISIT (OUTPATIENT)
Dept: UROLOGY | Facility: CLINIC | Age: 58
End: 2023-08-07
Payer: OTHER GOVERNMENT

## 2023-08-07 VITALS — HEIGHT: 63 IN | RESPIRATION RATE: 18 BRPM | WEIGHT: 170.19 LBS | BODY MASS INDEX: 30.16 KG/M2

## 2023-08-07 DIAGNOSIS — N39.41 URGE INCONTINENCE: Primary | ICD-10-CM

## 2023-08-07 DIAGNOSIS — R35.0 FREQUENT URINATION: ICD-10-CM

## 2023-08-07 LAB — URINE VOLUME: 0

## 2023-08-07 PROCEDURE — 99213 OFFICE O/P EST LOW 20 MIN: CPT | Performed by: NURSE PRACTITIONER

## 2023-08-11 ENCOUNTER — TELEPHONE (OUTPATIENT)
Dept: INTERNAL MEDICINE | Facility: CLINIC | Age: 58
End: 2023-08-11
Payer: OTHER GOVERNMENT

## 2023-08-11 ENCOUNTER — LAB (OUTPATIENT)
Dept: LAB | Facility: HOSPITAL | Age: 58
End: 2023-08-11
Payer: OTHER GOVERNMENT

## 2023-08-11 DIAGNOSIS — Z13.220 SCREENING FOR LIPID DISORDERS: ICD-10-CM

## 2023-08-11 DIAGNOSIS — R73.01 IMPAIRED FASTING GLUCOSE: ICD-10-CM

## 2023-08-11 DIAGNOSIS — E07.9 THYROID DISORDER: ICD-10-CM

## 2023-08-11 DIAGNOSIS — K21.9 GASTROESOPHAGEAL REFLUX DISEASE WITHOUT ESOPHAGITIS: ICD-10-CM

## 2023-08-11 LAB
ALBUMIN SERPL-MCNC: 4.5 G/DL (ref 3.5–5.2)
ALBUMIN/GLOB SERPL: 1.9 G/DL
ALP SERPL-CCNC: 94 U/L (ref 39–117)
ALT SERPL W P-5'-P-CCNC: 12 U/L (ref 1–33)
ANION GAP SERPL CALCULATED.3IONS-SCNC: 7 MMOL/L (ref 5–15)
AST SERPL-CCNC: 14 U/L (ref 1–32)
BASOPHILS # BLD AUTO: 0.05 10*3/MM3 (ref 0–0.2)
BASOPHILS NFR BLD AUTO: 0.9 % (ref 0–1.5)
BILIRUB SERPL-MCNC: 0.2 MG/DL (ref 0–1.2)
BUN SERPL-MCNC: 21 MG/DL (ref 8–23)
BUN/CREAT SERPL: 26.3 (ref 7–25)
CALCIUM SPEC-SCNC: 9.9 MG/DL (ref 8.6–10.5)
CHLORIDE SERPL-SCNC: 105 MMOL/L (ref 98–107)
CHOLEST SERPL-MCNC: 187 MG/DL (ref 0–200)
CO2 SERPL-SCNC: 30 MMOL/L (ref 22–29)
CREAT SERPL-MCNC: 0.8 MG/DL (ref 0.57–1)
DEPRECATED RDW RBC AUTO: 39.7 FL (ref 37–54)
EGFRCR SERPLBLD CKD-EPI 2021: 84.5 ML/MIN/1.73
EOSINOPHIL # BLD AUTO: 0.24 10*3/MM3 (ref 0–0.4)
EOSINOPHIL NFR BLD AUTO: 4.3 % (ref 0.3–6.2)
ERYTHROCYTE [DISTWIDTH] IN BLOOD BY AUTOMATED COUNT: 12.7 % (ref 12.3–15.4)
GLOBULIN UR ELPH-MCNC: 2.4 GM/DL
GLUCOSE SERPL-MCNC: 105 MG/DL (ref 65–99)
HBA1C MFR BLD: 5.8 % (ref 4.8–5.6)
HCT VFR BLD AUTO: 41.2 % (ref 34–46.6)
HDLC SERPL-MCNC: 70 MG/DL (ref 40–60)
HGB BLD-MCNC: 13.4 G/DL (ref 12–15.9)
IMM GRANULOCYTES # BLD AUTO: 0.01 10*3/MM3 (ref 0–0.05)
IMM GRANULOCYTES NFR BLD AUTO: 0.2 % (ref 0–0.5)
LDLC SERPL CALC-MCNC: 103 MG/DL (ref 0–100)
LDLC/HDLC SERPL: 1.45 {RATIO}
LYMPHOCYTES # BLD AUTO: 1.51 10*3/MM3 (ref 0.7–3.1)
LYMPHOCYTES NFR BLD AUTO: 27.1 % (ref 19.6–45.3)
MCH RBC QN AUTO: 28.2 PG (ref 26.6–33)
MCHC RBC AUTO-ENTMCNC: 32.5 G/DL (ref 31.5–35.7)
MCV RBC AUTO: 86.7 FL (ref 79–97)
MONOCYTES # BLD AUTO: 0.4 10*3/MM3 (ref 0.1–0.9)
MONOCYTES NFR BLD AUTO: 7.2 % (ref 5–12)
NEUTROPHILS NFR BLD AUTO: 3.37 10*3/MM3 (ref 1.7–7)
NEUTROPHILS NFR BLD AUTO: 60.3 % (ref 42.7–76)
NRBC BLD AUTO-RTO: 0 /100 WBC (ref 0–0.2)
PLATELET # BLD AUTO: 343 10*3/MM3 (ref 140–450)
PMV BLD AUTO: 10.2 FL (ref 6–12)
POTASSIUM SERPL-SCNC: 4.4 MMOL/L (ref 3.5–5.2)
PROT SERPL-MCNC: 6.9 G/DL (ref 6–8.5)
RBC # BLD AUTO: 4.75 10*6/MM3 (ref 3.77–5.28)
SODIUM SERPL-SCNC: 142 MMOL/L (ref 136–145)
T4 FREE SERPL-MCNC: 1.08 NG/DL (ref 0.93–1.7)
TRIGL SERPL-MCNC: 77 MG/DL (ref 0–150)
TSH SERPL DL<=0.05 MIU/L-ACNC: 2.06 UIU/ML (ref 0.27–4.2)
VLDLC SERPL-MCNC: 14 MG/DL (ref 5–40)
WBC NRBC COR # BLD: 5.58 10*3/MM3 (ref 3.4–10.8)

## 2023-08-11 PROCEDURE — 80053 COMPREHEN METABOLIC PANEL: CPT

## 2023-08-11 PROCEDURE — 85025 COMPLETE CBC W/AUTO DIFF WBC: CPT

## 2023-08-11 PROCEDURE — 84443 ASSAY THYROID STIM HORMONE: CPT

## 2023-08-11 PROCEDURE — 83036 HEMOGLOBIN GLYCOSYLATED A1C: CPT

## 2023-08-11 PROCEDURE — 84439 ASSAY OF FREE THYROXINE: CPT

## 2023-08-11 PROCEDURE — 36415 COLL VENOUS BLD VENIPUNCTURE: CPT

## 2023-08-11 PROCEDURE — 80061 LIPID PANEL: CPT

## 2023-08-11 NOTE — TELEPHONE ENCOUNTER
Called and LM for the patient. Michelle wanted me to let her know that her blood pressure readings are looking good. OKAY FOR HUB TO READ

## 2023-08-28 ENCOUNTER — OFFICE VISIT (OUTPATIENT)
Dept: INTERNAL MEDICINE | Facility: CLINIC | Age: 58
End: 2023-08-28
Payer: OTHER GOVERNMENT

## 2023-08-28 VITALS
HEIGHT: 63 IN | HEART RATE: 73 BPM | DIASTOLIC BLOOD PRESSURE: 81 MMHG | SYSTOLIC BLOOD PRESSURE: 152 MMHG | TEMPERATURE: 97.3 F | BODY MASS INDEX: 30.69 KG/M2 | OXYGEN SATURATION: 100 % | WEIGHT: 173.2 LBS

## 2023-08-28 DIAGNOSIS — M54.2 CERVICALGIA: ICD-10-CM

## 2023-08-28 DIAGNOSIS — N39.41 URGE INCONTINENCE: Primary | ICD-10-CM

## 2023-08-28 DIAGNOSIS — R31.29 MICROHEMATURIA: ICD-10-CM

## 2023-08-28 DIAGNOSIS — I10 PRIMARY HYPERTENSION: ICD-10-CM

## 2023-08-28 DIAGNOSIS — Z98.890 STATUS POST REPAIR OF ANTERIOR CRUCIATE LIGAMENT: ICD-10-CM

## 2023-08-28 DIAGNOSIS — E03.9 HYPOTHYROIDISM, UNSPECIFIED TYPE: ICD-10-CM

## 2023-08-28 DIAGNOSIS — R73.03 PREDIABETES: ICD-10-CM

## 2023-08-28 LAB
BACTERIA UR QL AUTO: ABNORMAL /HPF
BILIRUB BLD-MCNC: NEGATIVE MG/DL
BILIRUB UR QL STRIP: NEGATIVE
CLARITY UR: CLEAR
CLARITY, POC: CLEAR
COLOR UR: YELLOW
COLOR UR: YELLOW
EXPIRATION DATE: ABNORMAL
GLUCOSE UR STRIP-MCNC: NEGATIVE MG/DL
GLUCOSE UR STRIP-MCNC: NEGATIVE MG/DL
HGB UR QL STRIP.AUTO: NEGATIVE
HYALINE CASTS UR QL AUTO: ABNORMAL /LPF
KETONES UR QL STRIP: NEGATIVE
KETONES UR QL: NEGATIVE
LEUKOCYTE EST, POC: ABNORMAL
LEUKOCYTE ESTERASE UR QL STRIP.AUTO: ABNORMAL
Lab: ABNORMAL
NITRITE UR QL STRIP: NEGATIVE
NITRITE UR-MCNC: NEGATIVE MG/ML
PH UR STRIP.AUTO: 7 [PH] (ref 5–8)
PH UR: 6 [PH] (ref 5–8)
PROT UR QL STRIP: NEGATIVE
PROT UR STRIP-MCNC: NEGATIVE MG/DL
RBC # UR STRIP: ABNORMAL /HPF
RBC # UR STRIP: ABNORMAL /UL
REF LAB TEST METHOD: ABNORMAL
SP GR UR STRIP: 1.01 (ref 1–1.03)
SP GR UR: 1 (ref 1–1.03)
SQUAMOUS #/AREA URNS HPF: ABNORMAL /HPF
UROBILINOGEN UR QL STRIP: ABNORMAL
UROBILINOGEN UR QL: ABNORMAL
WBC # UR STRIP: ABNORMAL /HPF

## 2023-08-28 PROCEDURE — 99214 OFFICE O/P EST MOD 30 MIN: CPT

## 2023-08-28 PROCEDURE — 81001 URINALYSIS AUTO W/SCOPE: CPT

## 2023-08-28 PROCEDURE — 81003 URINALYSIS AUTO W/O SCOPE: CPT

## 2023-08-28 NOTE — ASSESSMENT & PLAN NOTE
Blood pressure is improving. She brought in an at home log and BP readings were much better.  She is currently on losartan 25 mg daily.  BP is elevated today but she just drank 2 cups of coffee.  Continue current regimen. Monitor BP at home and report elevated readings.

## 2023-08-28 NOTE — ASSESSMENT & PLAN NOTE
Much improvement on Myrbetriq 50 mg daily.   Continue regimen.  She is needing PA-we will work on that.

## 2023-08-28 NOTE — ASSESSMENT & PLAN NOTE
Patient states that her neck has been bothering her. She states this has been going on for over a year. She puts Voltaren gel on there. She states she notices when she turns she feels popping. She does feel like it is muscular.   We will get xrays  Recommend PT as well

## 2023-08-28 NOTE — ASSESSMENT & PLAN NOTE
Patient also complains of right knee pain. She had an ACL repair a few years ago. She states that it took awhile for her knee to heal. She states she notices when she puts pressure on her right knee. She feels like it is on the lateral side. She states that it has only been going on for a week or so.  Pt to follow up with Dr. López

## 2023-08-28 NOTE — ASSESSMENT & PLAN NOTE
UA is positive for blood and leukocytes  She denies any symptoms at this time.  We will send off for culture.   Follow up with urology prn.

## 2023-08-28 NOTE — PROGRESS NOTES
Chief Complaint  Knee Pain (Right), Neck Pain (Center), and Follow-up (4 week)    History of Present Illness  SUBJECTIVE  Dorina Basilio presents to [facility] for a follow up on hypertension.     She is going to physical therapy for pelvic floor therapy.      Past Medical History:   Diagnosis Date    Abnormal Pap smear of cervix     Acid reflux     Allergic     seasonal    Bladder disorder     THEODORA/URGENCY    Cholelithiasis     removed    Hypothyroidism     Kidney stone     Migraines     Pap smear for cervical cancer screening 01/2020    normal    Primary hypertension 7/31/2023    Seasonal allergies       Family History   Problem Relation Age of Onset    Diabetes Mother     Ovarian cancer Mother     Osteoporosis Mother     Arthritis Mother     Cancer Mother     Diabetes Brother     Arthritis Brother     Diabetes Sister     Diabetes Maternal Grandmother     Diabetes Maternal Grandfather     Stomach cancer Other         uncle    Diabetes Other         Aunt, uncle    Arthritis Brother     Diabetes Brother     Diabetes Sister     Malig Hyperthermia Neg Hx     Breast cancer Neg Hx     Uterine cancer Neg Hx     Colon cancer Neg Hx     Melanoma Neg Hx     Prostate cancer Neg Hx       Past Surgical History:   Procedure Laterality Date    CARPAL TUNNEL RELEASE Right 2016    CHOLECYSTECTOMY  2003    COLONOSCOPY  2016    NORMAL    CYST REMOVAL  1979    throat    CYST REMOVAL Right 2013    ganglion    DIAGNOSTIC LAPAROSCOPY      ENDOMETRIAL ABLATION      GALLBLADDER SURGERY  2003    KNEE ARTHROSCOPY Right     LAPAROSCOPIC TUBAL LIGATION      FULGERATION    OTHER SURGICAL HISTORY  1992    Chromoendoscopy of upper or lower gastrointestinal tract    TONSILLECTOMY  1969    TOOTH EXTRACTION  1978    TRIGGER FINGER RELEASE Right 2016    4TH FINGER    TRIGGER FINGER RELEASE Right 04/28/2022    Procedure: RIGHT TRIGGER FINGER  RELEASE (long/middle finger);  Surgeon: Vladimir López MD;  Location:  " [facility];  Service: Orthopedics;  Laterality: Right;    TUBAL ABDOMINAL LIGATION      UMBILICAL HERNIA REPAIR  2008    VULVA SURGERY  1997        Current Outpatient Medications:     azelastine (OPTIVAR) 0.05 % ophthalmic solution, Administer 1 drop to both eyes 2 (Two) Times a Day., Disp: 18 mL, Rfl: 1    fluticasone (FLONASE) 50 MCG/ACT nasal spray, 2 sprays into the nostril(s) as directed by provider Daily., Disp: 48 g, Rfl: 1    levothyroxine (SYNTHROID, LEVOTHROID) 25 MCG tablet, Take 1 tablet by mouth Every Morning., Disp: 90 tablet, Rfl: 1    loratadine (CLARITIN) 10 MG tablet, Take 1 tablet by mouth Daily., Disp: 90 tablet, Rfl: 1    losartan (Cozaar) 25 MG tablet, Take 1 tablet by mouth Daily., Disp: 90 tablet, Rfl: 0    minocycline (MINOCIN,DYNACIN) 100 MG capsule, TAKE 1 CAPSULE BY MOUTH EVERY DAY AS NEEDED FOR FLARES, Disp: , Rfl:     Mirabegron ER (Myrbetriq) 50 MG tablet sustained-release 24 hour 24 hr tablet, Take 50 mg by mouth Daily., Disp: 90 tablet, Rfl: 1    Mirabegron ER (Myrbetriq) 50 MG tablet sustained-release 24 hour 24 hr tablet, Take 50 mg by mouth Daily for 42 days., Disp: 42 tablet, Rfl: 0    omeprazole (priLOSEC) 20 MG capsule, Take 1 capsule by mouth Daily., Disp: 90 capsule, Rfl: 1    OBJECTIVE  Vital Signs:   /81 (BP Location: Right arm, Patient Position: Sitting, Cuff Size: Adult)   Pulse 73   Temp 97.3 øF (36.3 øC)   Ht 160 cm (63\")   Wt 78.6 kg (173 lb 3.2 oz)   SpO2 100%   BMI 30.68 kg/mý    Estimated body mass index is 30.68 kg/mý as calculated from the following:    Height as of this encounter: 160 cm (63\").    Weight as of this encounter: 78.6 kg (173 lb 3.2 oz).     Wt Readings from Last 3 Encounters:   08/28/23 78.6 kg (173 lb 3.2 oz)   08/07/23 77.2 kg (170 lb 3.1 oz)   07/31/23 77.2 kg (170 lb 3.2 oz)     BP Readings from Last 3 Encounters:   08/28/23 152/81   07/31/23 142/84   03/31/23 136/95       Physical Exam  Vitals and nursing note reviewed. "   Constitutional:       Appearance: Normal appearance.   HENT:      Head: Normocephalic.   Eyes:      Extraocular Movements: Extraocular movements intact.      Conjunctiva/sclera: Conjunctivae normal.   Cardiovascular:      Rate and Rhythm: Normal rate and regular rhythm.      Heart sounds: Normal heart sounds. No murmur heard.  Pulmonary:      Effort: Pulmonary effort is normal.      Breath sounds: Normal breath sounds. No wheezing or rales.   Abdominal:      General: Bowel sounds are normal.      Palpations: Abdomen is soft.      Tenderness: There is no abdominal tenderness. There is no guarding.   Musculoskeletal:         General: No swelling. Normal range of motion.      Cervical back: Muscular tenderness present.      Right knee: Tenderness present over the lateral joint line.   Skin:     General: Skin is warm and dry.   Neurological:      General: No focal deficit present.      Mental Status: She is alert. Mental status is at baseline.   Psychiatric:         Mood and Affect: Mood normal.         Thought Content: Thought content normal.        Result Review    CMP          2/21/2023    09:22 8/11/2023    09:08   CMP   Glucose 98  105    BUN 11  21    Creatinine 0.86  0.80    EGFR 77.9  84.5    Sodium 142  142    Potassium 3.9  4.4    Chloride 106  105    Calcium 9.7  9.9    Total Protein 7.3  6.9    Albumin 4.7  4.5    Globulin 2.6  2.4    Total Bilirubin 0.2  0.2    Alkaline Phosphatase 93  94    AST (SGOT) 12  14    ALT (SGPT) 15  12    Albumin/Globulin Ratio 1.8  1.9    BUN/Creatinine Ratio 12.8  26.3    Anion Gap 10.6  7.0      CBC w/diff          2/21/2023    09:22 8/11/2023    09:08   CBC w/Diff   WBC 5.53  5.58    RBC 4.83  4.75    Hemoglobin 13.5  13.4    Hematocrit 41.3  41.2    MCV 85.5  86.7    MCH 28.0  28.2    MCHC 32.7  32.5    RDW 12.8  12.7    Platelets 264  343    Neutrophil Rel % 65.3  60.3    Immature Granulocyte Rel % 0.2  0.2    Lymphocyte Rel % 24.6  27.1    Monocyte Rel % 5.2  7.2     Eosinophil Rel % 4.0  4.3    Basophil Rel % 0.7  0.9      Lipid Panel          10/7/2022    08:12 2/21/2023    09:22 8/11/2023    09:08   Lipid Panel   Total Cholesterol 178  206  187    Triglycerides 69  145  77    HDL Cholesterol 65  67  70    VLDL Cholesterol 13  25  14    LDL Cholesterol  100  114  103    LDL/HDL Ratio 1.53  1.64  1.45      TSH          12/6/2022    09:19 2/21/2023    09:22 8/11/2023    09:08   TSH   TSH 3.850  2.490  2.060      Lab Results   Component Value Date    FREET4 1.08 08/11/2023     Lab Results   Component Value Date    LMZMMUEN79 1,138 (H) 02/21/2023       CT Pelvis With & Without Contrast    Result Date: 5/9/2023    1. No evidence of bladder fistula or bladder contour abnormalities.     Tariq Santacruz MD       Electronically Signed and Approved By: Tariq Santacruz MD on 5/09/2023 at 8:30             CT Abdomen Pelvis Stone Protocol    Result Date: 6/12/2023   No urolithiasis or or obstructive uropathy.  No findings to account for hematuria.  No significant abdominopelvic finding demonstrated     LUCIANO FAROOQ MD       Electronically Signed and Approved By: LUCIANO FAROOQ MD on 6/12/2023 at 11:57             Mammo Screening Digital Tomosynthesis Bilateral With CAD    Result Date: 5/8/2023   Benign mammogram. Suggest routine mammographic screening.  RECOMMENDATION(S):  ROUTINE MAMMOGRAM AND CLINICAL EVALUATION IN 12 MONTHS.   BIRADS:  DIAGNOSTIC CATEGORY 1--NEGATIVE.   BREAST COMPOSITION: Scattered areas fibroglandular density.  PLEASE NOTE:  A NORMAL MAMMOGRAM DOES NOT EXCLUDE THE POSSIBILITY OF BREAST CANCER. ANY CLINICALLY SUSPICIOUS PALPABLE LUMP SHOULD BE BIOPSIED.      Caesar Williamson M.D.       Electronically Signed and Approved By: Caesar Williamson M.D. on 5/08/2023 at 16:18                The above data has been reviewed by MARILYN Antoine 08/28/2023 08:50 EDT.          Patient Care Team:  Michelle Martinez APRN as PCP - General (Internal Medicine)  Emmy Hill,  MARILYN as Nurse Practitioner (Urology)  Kedar Condon MD as Consulting Physician (Urology)      ASSESSMENT & PLAN    Diagnoses and all orders for this visit:    1. Urge incontinence (Primary)  Assessment & Plan:  Much improvement on Myrbetriq 50 mg daily.   Continue regimen.  She is needing PA-we will work on that.    Orders:  -     POCT urinalysis dipstick, automated    2. Primary hypertension  Assessment & Plan:  Blood pressure is improving. She brought in an at home log and BP readings were much better.  She is currently on losartan 25 mg daily.  BP is elevated today but she just drank 2 cups of coffee.  Continue current regimen. Monitor BP at home and report elevated readings.    Orders:  -     CBC & Differential; Future  -     Comprehensive Metabolic Panel; Future  -     Lipid Panel; Future  -     Urinalysis With Microscopic - Urine, Clean Catch; Future  -     Urinalysis With Microscopic - Urine, Clean Catch    3. Prediabetes  Assessment & Plan:  A1C is down to 5.8%  Continue healthy diet and regular exercise.    Orders:  -     Hemoglobin A1c; Future    4. Hypothyroidism, unspecified type  Assessment & Plan:  Normal thyroid functions  Continue synthroid 25 mcg qam    Orders:  -     TSH Rfx On Abnormal To Free T4; Future    5. Status post repair of anterior cruciate ligament  Assessment & Plan:    Patient also complains of right knee pain. She had an ACL repair a few years ago. She states that it took awhile for her knee to heal. She states she notices when she puts pressure on her right knee. She feels like it is on the lateral side. She states that it has only been going on for a week or so.  Pt to follow up with Dr. López    Orders:  -     Ambulatory Referral to Orthopedic Surgery    6. Cervicalgia  Assessment & Plan:    Patient states that her neck has been bothering her. She states this has been going on for over a year. She puts Voltaren gel on there. She states she notices when she turns she feels  popping. She does feel like it is muscular.   We will get xrays  Recommend PT as well    Orders:  -     XR Spine Cervical Complete 4 or 5 View; Future    7. Microhematuria  Assessment & Plan:  UA is positive for blood and leukocytes  She denies any symptoms at this time.  We will send off for culture.   Follow up with urology prn.           Tobacco Use: Low Risk     Smoking Tobacco Use: Never    Smokeless Tobacco Use: Never    Passive Exposure: Never       Follow Up     Return in about 6 months (around 2/28/2024) for Annual physical.    Please note that portions of this note were completed with a voice recognition program.    Patient was given instructions and counseling regarding her condition or for health maintenance advice. Please see specific information pulled into the AVS if appropriate.   I have reviewed information obtained and documented by others and I have confirmed the accuracy of this documented note.    MARILYN Antoine

## 2023-08-28 NOTE — PROGRESS NOTES
Can you call her and see if she has any UTI symptoms? She did not admit to any. Also lets send this off for a culture

## 2023-09-05 ENCOUNTER — HOSPITAL ENCOUNTER (OUTPATIENT)
Dept: GENERAL RADIOLOGY | Facility: HOSPITAL | Age: 58
Discharge: HOME | End: 2023-09-05
Payer: OTHER GOVERNMENT

## 2023-09-05 DIAGNOSIS — M54.2 CERVICALGIA: ICD-10-CM

## 2023-09-05 PROCEDURE — 72050 X-RAY EXAM NECK SPINE 4/5VWS: CPT

## 2023-09-08 DIAGNOSIS — E03.9 HYPOTHYROIDISM, UNSPECIFIED TYPE: ICD-10-CM

## 2023-09-08 RX ORDER — LEVOTHYROXINE SODIUM 0.03 MG/1
25 TABLET ORAL EVERY MORNING
Qty: 90 TABLET | Refills: 1 | Status: SHIPPED | OUTPATIENT
Start: 2023-09-08

## 2023-09-14 ENCOUNTER — TELEPHONE (OUTPATIENT)
Dept: UROLOGY | Facility: CLINIC | Age: 58
End: 2023-09-14
Payer: OTHER GOVERNMENT

## 2023-09-14 NOTE — TELEPHONE ENCOUNTER
PATIENT CALLED.  KELLY GAVE HER SAMPLES OF MYRBETRIQ 50 MG.     SHE SAID [facility] HAD SENT A PRESCRIPTION, BUT IT NEEDS A PA.  THEY TOLD HER IT WOULD BE BETTER FOR UROLOGY TO DO THIS, AND THAT THEY HAD ALREADY SENT ON TO UROLOGY.    PLEASE CALL PATIENT TO DISCUSS.

## 2023-10-09 ENCOUNTER — OFFICE VISIT (OUTPATIENT)
Dept: ORTHOPEDIC SURGERY | Facility: CLINIC | Age: 58
End: 2023-10-09
Payer: OTHER GOVERNMENT

## 2023-10-09 VITALS
HEIGHT: 63 IN | SYSTOLIC BLOOD PRESSURE: 127 MMHG | WEIGHT: 172 LBS | OXYGEN SATURATION: 96 % | HEART RATE: 62 BPM | DIASTOLIC BLOOD PRESSURE: 82 MMHG | BODY MASS INDEX: 30.48 KG/M2

## 2023-10-09 DIAGNOSIS — M25.561 RIGHT KNEE PAIN, UNSPECIFIED CHRONICITY: Primary | ICD-10-CM

## 2023-10-09 DIAGNOSIS — M70.41 PREPATELLAR BURSITIS OF RIGHT KNEE: ICD-10-CM

## 2023-10-09 NOTE — PROGRESS NOTES
"Chief Complaint  Follow-up of the Right Knee (Sharp pain when pressure is applied/Had surgery done 202)     Subjective      Dorina Basilio presents to [facility] for follow up of the right knee.  She has had pain since the beginning of the year.  She felt a sharp pain with squatting and stooping.  She had surgery in the past of an arthroscopy.  She has pain with stairs.  She has pain when putting weight on the knee as getting in bed.  The pain is below the patella.      Allergies   Allergen Reactions    Piroxicam Rash        Social History     Socioeconomic History    Marital status:    Tobacco Use    Smoking status: Never     Passive exposure: Never    Smokeless tobacco: Never   Vaping Use    Vaping Use: Never used   Substance and Sexual Activity    Alcohol use: Yes     Comment: have not had alcohol since the beginning of COVID    Drug use: Never    Sexual activity: Yes     Partners: Male     Birth control/protection: Surgical, Tubal ligation, Vasectomy        I reviewed the patient's chief complaint, history of present illness, review of systems, past medical history, surgical history, family history, social history, medications, and allergy list.     Review of Systems     Constitutional: Denies fevers, chills, weight loss  Cardiovascular: Denies chest pain, shortness of breath  Skin: Denies rashes, acute skin changes  Neurologic: Denies headache, loss of consciousness        Vital Signs:   /82   Pulse 62   Ht 160 cm (63\")   Wt 78 kg (172 lb)   SpO2 96%   BMI 30.47 kg/mý          Physical Exam  General: Alert. No acute distress    Ortho Exam        RIGHT KNEE Flexion 125. Extension 0. Stable to varus/valgus stress. Stable to anterior/posterior drawer. Neurovascularly intact. Negative Emma. Negative Lachman. Positive EHL, FHL, HS and TA. Sensation intact to light touch all 5 nerves of the foot. Ambulates with Non-antalgic gait. Patella is well tracking. Calf " supple, non-tender. Positive tenderness to the medial joint line. Positive tenderness to the lateral joint line. Negative Crepitus. Good strength to hamstrings, quadriceps, dorsiflexors, and plantar flexors.  Knee Extensor Mechanism intact. Prepatellar bursitis.         Procedures        Imaging Results (Most Recent)       Procedure Component Value Units Date/Time    XR Knee 3 View Right [[id number]] Resulted: 10/09/23 1025     Updated: 10/09/23 1031             Result Review :     X-Ray Report:  Right knee X-Ray  Indication: Evaluation of the right knee  AP/Lateral and [place] view(s)  Findings: Mild patella tilt.   Prior studies available for comparison: yes             Assessment and Plan     Diagnoses and all orders for this visit:    1. Right knee pain, unspecified chronicity (Primary)  -     XR Knee 3 View Right    2. Prepatellar bursitis of right knee        Discussed the treatment plan with the patient. I reviewed the X-rays that were obtained today with the patient.     Discussed the treatment options with the patient, operative vs non-operative the patient could proceed with excision of the bursa sac below the patella.      The patient expressed understanding and wished to proceed with conservative measures.        Call or return if worsening symptoms.    Follow Up     PRN      Patient was given instructions and counseling regarding her condition or for health maintenance advice. Please see specific information pulled into the AVS if appropriate.     Scribed for Vladimir López MD by Coral Enciso MA.  10/09/23   10:20 EDT      I have personally performed the services described in this document as scribed by the above individual and it is both accurate and complete. Vladimir López MD 10/10/23

## 2023-11-08 NOTE — PROGRESS NOTES
Chief Complaint: Urinary Incontinence    Subjective         History of Present Illness  Dorina Basilio is a 58 y.o. female presents to [facility] to be seen for follow-up.    Patient was seen by me with last visit on 8/7/2023 for recurrent urination and urge incontinence.  She was doing well on Myrbetriq 50 mg.  She is here for follow-up.    She has been off Myrbetriq since she ran out of samples. It was helping with her symptoms.  She is now back to having urinary frequency and urgency.  She does however report that the pelvic floor physical therapy has been helpful with increasing her muscle strength so that she is able to hold it when she does have urgency until she can get to the bathroom.    Previous 8/7/2023:  Patient was previously seen by me with last visit on 5/10/2023 for hematuria and urge incontinence.  She was put on oxybutynin which she took sporadically, however when she had her cystoscopy completed with Dr. Condon she advised him that it was just causing for too much dry mouth and she was unable to continue it so she was given samples of Myrbetriq.  She is here for follow-up. She reports she is doing well with Myrbetriq but needs to get this through her pharmacy. She needs samples today to get her through until she gets the medication from her pharmacy.  Her PCP did order the medication, so they will need to process the prior authorization.        Previous 5/10/2023:  Patient was previously seen by me with last appointment being on 4/4/2023.  At that visit she was started on oxybutynin.  We are also doing a work-up for hematuria.  She is here for follow-up PVR. She reports she is taking the medication sporadically. She takes it only when she is going to be out of the home for an extended period of time. She reports it does work well when she takes it.      Previous 4/4/2023:  Patient was seen by PCP on 3/7/2023 with a diagnosis of hematuria and was referred here for  "further evaluation.  She also reported at that time that she had \"vaginal mesh \"in 2016 but has been having trouble with her bladder again.  She was seen by another primary care provider in that office on 3/31 and discussed that she was having difficulty with urinary urgency with incontinence.     Frequency- every 1/2 hour- states she does drink a lot of water   Urgency- admits   Incontinence- with urgency   Nocturia- 3-4   Stream- normal   GH- denies    surgeries- 2016 vaginal mesh- had THEODORA prior   Family history of  malignancy- denies   Cardiopulmonary- denies   Anticoagulants- denies   Smoker- denies   History of kidney stones- last was in 2009, passed spontaneously   Drinks water, cranberry juice, coffee x 1-2 cups per day   UA with microscopy  2/21/2023 6-12 RBCs per high-powered field  3/1/2022 0-2 RBCs per high-powered field     Mom had ovarian CA           Procedures per Dr. Condon on 6/26/2023.        Urinalysis was checked today and was negative for signs of infection        Cytoscopy Procedure:      Procedure: Flexible cytoscope was passed per urethra into the bladder without difficulty after proper consent. The bladder was inspected in a systematic meridian fashion.         Normal bladder, some minor cystitis cystica.  No pathology        There were no tumors, lesions, stones, or other abnormalities noted within the bladder. Of note, there was no increased vascularity as well. Both ureteral orifices were identified and were normal in appearance. The flexible cytoscope was removed. The patient tolerated the procedure well.       exam - normal external vaginal exam, on speculum exam no signs of prolapse or mesh erosion.           6/23 CT pelvis with and without - negative.  6/23 CT abdomen/pelvis without - negative        4/23 NP -oxybutynin-taking sporadically, works but causes her mouth to be too dry to continue to take this.  Vaginal mesh 2016 2/23 0.8, GFR   77     PVR     5/20 000   "   Frequency- every 1/2 hour- states she does drink a lot of water   Urgency- admits   Incontinence- with urgency   Nocturia- 3-4   Stream- normal   GH- denies    surgeries- 2016 vaginal mesh- had [facility] prior   Family history of  malignancy- denies   Cardiopulmonary- denies   Anticoagulants- denies   Smoker- denies   History of kidney stones- last was in 2009, passed spontaneously   Drinks water, cranberry juice, coffee x 1-2 cups per day   UA with microscopy  2/21/2023 6-12 RBCs per high-powered field  3/1/2022 0-2 RBCs per high-powered field           Urgency  Microhematuria     Cystoscopy negative, patient given reassurance     Stop oxybutynin causing severe dry's     Continue pelvic physical therapy     Myrbetriq 50 mg samples given today, risk and benefits discussed     Follow-up with NP           This document has been electronically signed by Kedar Condon MD  June 24, 2023 06:43 EDT        Objective     Past Medical History:   Diagnosis Date    Abnormal Pap smear of cervix     Acid reflux     Allergic     seasonal    Bladder disorder     [facility]/URGENCY    Cholelithiasis     removed    Hypothyroidism     Kidney stone     Migraines     Pap smear for cervical cancer screening 01/2020    normal    Primary hypertension 7/31/2023    Seasonal allergies        Past Surgical History:   Procedure Laterality Date    CARPAL TUNNEL RELEASE Right 2016    CHOLECYSTECTOMY  2003    COLONOSCOPY  2016    NORMAL    CYST REMOVAL  1979    throat    CYST REMOVAL Right 2013    ganglion    DIAGNOSTIC LAPAROSCOPY      ENDOMETRIAL ABLATION      GALLBLADDER SURGERY  2003    KNEE ARTHROSCOPY Right     LAPAROSCOPIC TUBAL LIGATION      FULGERATION    OTHER SURGICAL HISTORY  1992    Chromoendoscopy of upper or lower gastrointestinal tract    TONSILLECTOMY  1969    TOOTH EXTRACTION  1978    TRIGGER FINGER RELEASE Right 2016    4TH FINGER    TRIGGER FINGER RELEASE Right 04/28/2022    Procedure: RIGHT TRIGGER FINGER  RELEASE (long/middle  finger);  Surgeon: Vladimir López MD;  Location: [facility] OR [facility];  Service: Orthopedics;  Laterality: Right;    TUBAL ABDOMINAL LIGATION      UMBILICAL HERNIA REPAIR  2008    VULVA SURGERY  1997         Current Outpatient Medications:     azelastine (OPTIVAR) 0.05 % ophthalmic solution, Administer 1 drop to both eyes 2 (Two) Times a Day., Disp: 18 mL, Rfl: 1    fluticasone (FLONASE) 50 MCG/ACT nasal spray, 2 sprays into the nostril(s) as directed by provider Daily., Disp: 48 g, Rfl: 1    levothyroxine (SYNTHROID, LEVOTHROID) 25 MCG tablet, Take 1 tablet by mouth Every Morning., Disp: 90 tablet, Rfl: 1    loratadine (CLARITIN) 10 MG tablet, Take 1 tablet by mouth Daily., Disp: 90 tablet, Rfl: 1    losartan (Cozaar) 25 MG tablet, Take 1 tablet by mouth Daily., Disp: 90 tablet, Rfl: 0    minocycline (MINOCIN,DYNACIN) 100 MG capsule, TAKE 1 CAPSULE BY MOUTH EVERY DAY AS NEEDED FOR FLARES, Disp: , Rfl:     omeprazole (priLOSEC) 20 MG capsule, Take 1 capsule by mouth Daily., Disp: 90 capsule, Rfl: 1    Vibegron 75 MG tablet, Take 1 tablet by mouth Daily for 360 days., Disp: 90 tablet, Rfl: 3    Vibegron 75 MG tablet, Take 1 tablet by mouth Daily for 42 days., Disp: 42 tablet, Rfl: 0    Allergies   Allergen Reactions    Piroxicam Rash        Family History   Problem Relation Age of Onset    Diabetes Mother     Ovarian cancer Mother     Osteoporosis Mother     Arthritis Mother     Cancer Mother     Diabetes Brother     Arthritis Brother     Diabetes Sister     Diabetes Maternal Grandmother     Diabetes Maternal Grandfather     Stomach cancer Other         uncle    Diabetes Other         Aunt, uncle    Arthritis Brother     Diabetes Brother     Diabetes Sister     Malig Hyperthermia Neg Hx     Breast cancer Neg Hx     Uterine cancer Neg Hx     Colon cancer Neg Hx     Melanoma Neg Hx     Prostate cancer Neg Hx        Social History     Socioeconomic History    Marital status:    Tobacco Use    Smoking status: Never      "Passive exposure: Never    Smokeless tobacco: Never   Vaping Use    Vaping Use: Never used   Substance and Sexual Activity    Alcohol use: Yes     Comment: have not had alcohol since the beginning of COVID    Drug use: Never    Sexual activity: Yes     Partners: Male     Birth control/protection: Surgical, Tubal ligation, Vasectomy       Vital Signs:   /90 (BP Location: Left arm, Patient Position: Sitting, Cuff Size: Adult)   Pulse 59   Ht 160 cm (63\")   Wt 78 kg (172 lb)   BMI 30.47 kg/m²      Physical Exam  Vitals reviewed.   Constitutional:       Appearance: Normal appearance.   Neurological:      General: No focal deficit present.      Mental Status: She is alert and oriented to person, place, and time.   Psychiatric:         Mood and Affect: Mood normal.         Behavior: Behavior normal.          Result Review :   The following data was reviewed by: MARILYN Pinto on 11/10/2023:  Results for orders placed or performed in visit on 11/10/23   Bladder Scan   Result Value Ref Range    Urine Volume 92         Bladder Scan interpretation 11/10/2023    Estimation of residual urine via BVI 3000 Verathon Bladder Scan  MA/nurse performing: Nikky MCNEIL MA  Residual Urine: 92 ml  Indication: Urge incontinence    Frequent urination   Position: Supine  Examination: Incremental scanning of the suprapubic area using 2.0 MHz transducer using copious amounts of acoustic gel.   Findings: An anechoic area was demonstrated which represented the bladder, with measurement of residual urine as noted. I inspected this myself. In that the residual urine was insignificant, refer to plan for treatment and plan necessary at this time.           Procedures        Assessment and Plan    Diagnoses and all orders for this visit:    1. Urge incontinence (Primary)  -     Bladder Scan  -     Vibegron 75 MG tablet; Take 1 tablet by mouth Daily for 360 days.  Dispense: 90 tablet; Refill: 3  -     Discontinue: Vibegron 75 MG " tablet; Take 1 tablet by mouth Daily for 21 days.  Dispense: 21 tablet; Refill: 0  -     Vibegron 75 MG tablet; Take 1 tablet by mouth Daily for 42 days.  Dispense: 42 tablet; Refill: 0    2. Frequent urination  -     Vibegron 75 MG tablet; Take 1 tablet by mouth Daily for 360 days.  Dispense: 90 tablet; Refill: 3  -     Discontinue: Vibegron 75 MG tablet; Take 1 tablet by mouth Daily for 21 days.  Dispense: 21 tablet; Refill: 0  -     Vibegron 75 MG tablet; Take 1 tablet by mouth Daily for 42 days.  Dispense: 42 tablet; Refill: 0    Given her elevated blood pressure I am going to stop Myrbetriq and not go through the PA process.  We will switch her to Gemtesa to see if this is effective.  I am giving her samples to get her started while we work through the PA process.    I will see her in 6 weeks or sooner for new concerns.    Follow Up   Return in about 6 weeks (around 12/22/2023) for with PVR.  Patient was given instructions and counseling regarding her condition or for health maintenance advice. Please see specific information pulled into the AVS if appropriate.         This document has been electronically signed by MARILYN Pinto  November 10, 2023 08:19 EST

## 2023-11-10 ENCOUNTER — OFFICE VISIT (OUTPATIENT)
Dept: UROLOGY | Facility: CLINIC | Age: 58
End: 2023-11-10
Payer: OTHER GOVERNMENT

## 2023-11-10 VITALS
BODY MASS INDEX: 30.48 KG/M2 | DIASTOLIC BLOOD PRESSURE: 90 MMHG | SYSTOLIC BLOOD PRESSURE: 163 MMHG | HEIGHT: 63 IN | HEART RATE: 59 BPM | WEIGHT: 172 LBS

## 2023-11-10 DIAGNOSIS — R35.0 FREQUENT URINATION: ICD-10-CM

## 2023-11-10 DIAGNOSIS — N39.41 URGE INCONTINENCE: Primary | ICD-10-CM

## 2023-11-10 LAB — URINE VOLUME: 92

## 2023-11-14 DIAGNOSIS — K21.9 GASTROESOPHAGEAL REFLUX DISEASE, UNSPECIFIED WHETHER ESOPHAGITIS PRESENT: ICD-10-CM

## 2023-11-14 DIAGNOSIS — J30.89 SEASONAL ALLERGIC RHINITIS DUE TO OTHER ALLERGIC TRIGGER: ICD-10-CM

## 2023-11-14 DIAGNOSIS — J30.9 ALLERGIC RHINITIS, UNSPECIFIED SEASONALITY, UNSPECIFIED TRIGGER: ICD-10-CM

## 2023-11-14 RX ORDER — OMEPRAZOLE 20 MG/1
20 CAPSULE, DELAYED RELEASE ORAL DAILY
Qty: 90 CAPSULE | Refills: 1 | Status: SHIPPED | OUTPATIENT
Start: 2023-11-14

## 2023-11-14 RX ORDER — LORATADINE 10 MG/1
10 TABLET ORAL DAILY
Qty: 90 TABLET | Refills: 1 | Status: SHIPPED | OUTPATIENT
Start: 2023-11-14

## 2023-11-14 RX ORDER — FLUTICASONE PROPIONATE 50 MCG
2 SPRAY, SUSPENSION (ML) NASAL DAILY
Qty: 48 G | Refills: 1 | Status: SHIPPED | OUTPATIENT
Start: 2023-11-14

## 2023-11-14 RX ORDER — AZELASTINE HYDROCHLORIDE 0.5 MG/ML
1 SOLUTION/ DROPS OPHTHALMIC 2 TIMES DAILY
Qty: 18 ML | Refills: 1 | Status: SHIPPED | OUTPATIENT
Start: 2023-11-14

## 2023-12-13 ENCOUNTER — TELEPHONE (OUTPATIENT)
Dept: INTERNAL MEDICINE | Facility: CLINIC | Age: 58
End: 2023-12-13

## 2023-12-13 NOTE — TELEPHONE ENCOUNTER
HUB STAFF ATTEMPTED TO FOLLOW WARM TRANSFER PROCESS AND WAS UNSUCCESSFUL.    Caller: Dorina Basilio    Relationship to patient: Self    Best call back number: [phone]     Chief complaint: HEADACHES, SINUS CONGESTION, FACIAL PAIN AROUND EYES AND NOSE    Requested date: ASAP     Additional notes:PATIENT STATED THAT SHE WILL SEE ANY PROVIDER THAT CAN GET HER IN THO THE OFFICE.

## 2023-12-14 ENCOUNTER — OFFICE VISIT (OUTPATIENT)
Dept: INTERNAL MEDICINE | Facility: CLINIC | Age: 58
End: 2023-12-14
Payer: OTHER GOVERNMENT

## 2023-12-14 VITALS
HEART RATE: 65 BPM | BODY MASS INDEX: 31.25 KG/M2 | WEIGHT: 176.4 LBS | HEIGHT: 63 IN | TEMPERATURE: 98.2 F | SYSTOLIC BLOOD PRESSURE: 142 MMHG | OXYGEN SATURATION: 99 % | DIASTOLIC BLOOD PRESSURE: 90 MMHG

## 2023-12-14 DIAGNOSIS — M25.511 CHRONIC RIGHT SHOULDER PAIN: ICD-10-CM

## 2023-12-14 DIAGNOSIS — J01.40 ACUTE NON-RECURRENT PANSINUSITIS: Primary | ICD-10-CM

## 2023-12-14 DIAGNOSIS — R03.0 ELEVATED BLOOD PRESSURE READING: ICD-10-CM

## 2023-12-14 DIAGNOSIS — G89.29 CHRONIC RIGHT SHOULDER PAIN: ICD-10-CM

## 2023-12-14 LAB
EXPIRATION DATE: NORMAL
FLUAV AG UPPER RESP QL IA.RAPID: NOT DETECTED
FLUBV AG UPPER RESP QL IA.RAPID: NOT DETECTED
INTERNAL CONTROL: NORMAL
Lab: NORMAL
SARS-COV-2 AG UPPER RESP QL IA.RAPID: NOT DETECTED

## 2023-12-14 PROCEDURE — 87428 SARSCOV & INF VIR A&B AG IA: CPT

## 2023-12-14 PROCEDURE — 99213 OFFICE O/P EST LOW 20 MIN: CPT

## 2023-12-14 RX ORDER — PREDNISONE 20 MG/1
20 TABLET ORAL 2 TIMES DAILY
Qty: 10 TABLET | Refills: 0 | Status: SHIPPED | OUTPATIENT
Start: 2023-12-14 | End: 2023-12-19

## 2023-12-14 RX ORDER — AMOXICILLIN AND CLAVULANATE POTASSIUM 875; 125 MG/1; MG/1
1 TABLET, FILM COATED ORAL 2 TIMES DAILY
Qty: 20 TABLET | Refills: 0 | Status: SHIPPED | OUTPATIENT
Start: 2023-12-14 | End: 2023-12-24

## 2023-12-14 NOTE — ASSESSMENT & PLAN NOTE
Patient complains of right shoulder pain that has been going on for a couple months. She states that she has been doing exercises and using TENS unit. She does see Dr. López.  Steroid prescribed today for sinus infection and should help with shoulder pain.  If no improvement, she will f/u with ortho for possible imaging/injection

## 2023-12-14 NOTE — ASSESSMENT & PLAN NOTE
Patient complains of sinus pressure/pain, headaches, sinus drainage, intermittent cough, fatigue and runny nose.   Patient denies fever, chills, nausea, vomiting or diarrhea.    Symptom onset-1 week  She is taking flonase and Claritin already. She is taking tylenol PRN. She is not getting any relief.   COVD/Flu negative  Treatment per orders. Continue allergy regimen. May do sinus rinses.  Return to clinic if no improvement.

## 2023-12-14 NOTE — ASSESSMENT & PLAN NOTE
Blood pressure typically runs better at home.  It is higher in the office.  We will continue to have her monitor her BP at home.  Report elevated readings.

## 2023-12-14 NOTE — PROGRESS NOTES
Chief Complaint  Cough (The patient states she thinks she has a sinus infection. Facial pressure, migraines, drainage, runny nose. She is drinking plenty of fluids. This has been going on for about a week.)    History of Present Illness  SUBJECTIVE  Dorina Basilio presents to [facility] with URI symptoms.    Patient complains of sinus pressure/pain, headaches, sinus drainage, intermittent cough, and runny nose.   Patient denies fever, chills, nausea, vomiting or diarrhea.      Symptom onset-1 week    She is taking flonase and Claritin already. She is taking tylenol PRN. She is not getting any relief.     Patient complains of right shoulder pain that has been going on for a couple months. She states that she has been doing exercises and using TENS unit. She does see Dr. López.     Past Medical History:   Diagnosis Date    Abnormal Pap smear of cervix     Acid reflux     Allergic     seasonal    Bladder disorder     THEODORA/URGENCY    Cholelithiasis     removed    Hypothyroidism     Kidney stone     Migraines     Pap smear for cervical cancer screening 01/2020    normal    Primary hypertension 7/31/2023    Seasonal allergies       Family History   Problem Relation Age of Onset    Diabetes Mother     Ovarian cancer Mother     Osteoporosis Mother     Arthritis Mother     Cancer Mother     Diabetes Brother     Arthritis Brother     Diabetes Sister     Diabetes Maternal Grandmother     Diabetes Maternal Grandfather     Stomach cancer Other         uncle    Diabetes Other         Aunt, uncle    Arthritis Brother     Diabetes Brother     Diabetes Sister     Malig Hyperthermia Neg Hx     Breast cancer Neg Hx     Uterine cancer Neg Hx     Colon cancer Neg Hx     Melanoma Neg Hx     Prostate cancer Neg Hx       Past Surgical History:   Procedure Laterality Date    CARPAL TUNNEL RELEASE Right 2016    CHOLECYSTECTOMY  2003    COLONOSCOPY  2016    NORMAL    CYST REMOVAL  1979    throat    CYST  REMOVAL Right 2013    ganglion    DIAGNOSTIC LAPAROSCOPY      ENDOMETRIAL ABLATION      GALLBLADDER SURGERY  2003    KNEE ARTHROSCOPY Right     LAPAROSCOPIC TUBAL LIGATION      FULGERATION    OTHER SURGICAL HISTORY  1992    Chromoendoscopy of upper or lower gastrointestinal tract    TONSILLECTOMY  1969    TOOTH EXTRACTION  1978    TRIGGER FINGER RELEASE Right 2016    4TH FINGER    TRIGGER FINGER RELEASE Right 04/28/2022    Procedure: RIGHT TRIGGER FINGER  RELEASE (long/middle finger);  Surgeon: Vladimir López MD;  [place] [facility] [place] [facility] [place]  Service: Orthopedics;  Laterality: Right;    TUBAL ABDOMINAL LIGATION      UMBILICAL HERNIA REPAIR  2008    VULVA SURGERY  1997        Current Outpatient Medications:     azelastine (OPTIVAR) 0.05 % ophthalmic solution, Administer 1 drop to both eyes 2 (Two) Times a Day., Disp: 18 mL, Rfl: 1    fluticasone (FLONASE) 50 MCG/ACT nasal spray, 2 sprays into the nostril(s) as directed by provider Daily., Disp: 48 g, Rfl: 1    levothyroxine (SYNTHROID, LEVOTHROID) 25 MCG tablet, Take 1 tablet by mouth Every Morning., Disp: 90 tablet, Rfl: 1    loratadine (CLARITIN) 10 MG tablet, Take 1 tablet by mouth Daily., Disp: 90 tablet, Rfl: 1    losartan (Cozaar) 25 MG tablet, Take 1 tablet by mouth Daily., Disp: 90 tablet, Rfl: 0    minocycline (MINOCIN,DYNACIN) 100 MG capsule, TAKE 1 CAPSULE BY MOUTH EVERY DAY AS NEEDED FOR FLARES, Disp: , Rfl:     omeprazole (priLOSEC) 20 MG capsule, Take 1 capsule by mouth Daily., Disp: 90 capsule, Rfl: 1    Vibegron 75 MG tablet, Take 1 tablet by mouth Daily for 360 days., Disp: 90 tablet, Rfl: 3    Vibegron 75 MG tablet, Take 1 tablet by mouth Daily for 42 days., Disp: 42 tablet, Rfl: 0    amoxicillin-clavulanate (AUGMENTIN) 875-125 MG per tablet, Take 1 tablet by mouth 2 (Two) Times a Day for 10 days. Take with food., Disp: 20 tablet, Rfl: 0    predniSONE (DELTASONE) 20 MG tablet, Take 1 tablet by mouth 2 (Two) Times a Day for 5 days., Disp: 10 tablet,  "Rfl: 0    OBJECTIVE  Vital Signs:   /90 (BP Location: Left arm, Patient Position: Sitting, Cuff Size: Adult)   Pulse 65   Temp 98.2 °F (36.8 °C) (Skin)   Ht 160 cm (63\")   Wt 80 kg (176 lb 6.4 oz)   SpO2 99%   BMI 31.25 kg/m²    Estimated body mass index is 31.25 kg/m² as calculated from the following:    Height as of this encounter: 160 cm (63\").    Weight as of this encounter: 80 kg (176 lb 6.4 oz).     Wt Readings from Last 3 Encounters:   12/14/23 80 kg (176 lb 6.4 oz)   11/10/23 78 kg (172 lb)   10/09/23 78 kg (172 lb)     BP Readings from Last 3 Encounters:   12/14/23 142/90   11/10/23 163/90   10/09/23 127/82       Physical Exam  Vitals and nursing note reviewed.   Constitutional:       Appearance: Normal appearance.   HENT:      Head: Normocephalic.      Right Ear: A middle ear effusion is present. Tympanic membrane is not erythematous.      Left Ear: A middle ear effusion is present. Tympanic membrane is not erythematous.      Nose: Congestion present.      Right Sinus: Maxillary sinus tenderness and frontal sinus tenderness present.      Left Sinus: Maxillary sinus tenderness and frontal sinus tenderness present.   Eyes:      Extraocular Movements: Extraocular movements intact.      Conjunctiva/sclera: Conjunctivae normal.   Cardiovascular:      Rate and Rhythm: Normal rate and regular rhythm.      Heart sounds: Normal heart sounds. No murmur heard.  Pulmonary:      Effort: Pulmonary effort is normal.      Breath sounds: Normal breath sounds. No wheezing or rales.   Abdominal:      General: Bowel sounds are normal.      Palpations: Abdomen is soft.      Tenderness: There is no abdominal tenderness. There is no guarding.   Musculoskeletal:         General: No swelling. Normal range of motion.   Skin:     General: Skin is warm and dry.   Neurological:      General: No focal deficit present.      Mental Status: She is alert. Mental status is at baseline.   Psychiatric:         Mood and Affect: " Mood normal.         Behavior: Behavior normal.         Thought Content: Thought content normal.         Judgment: Judgment normal.          Result Review        XR Spine Cervical Complete 4 or 5 View    Result Date: 9/5/2023    Multilevel cervical spondylosis detailed above not significantly changed compared to 02/06/2022.  No acute radiographic findings.     LUIS FELIPE COOPER MD       Electronically Signed and Approved By: LUIS FELIPE COOPER MD on 9/05/2023 at 12:45                The above data has been reviewed by MARILYN Antoine 12/14/2023 12:09 EST.          Patient Care Team:  Michelle Martinez APRN as PCP - General (Internal Medicine)  Emmy Hill APRN as Nurse Practitioner (Urology)  Kedar Condon MD as Consulting Physician (Urology)            ASSESSMENT & PLAN    Diagnoses and all orders for this visit:    1. Acute non-recurrent pansinusitis (Primary)  Assessment & Plan:  Patient complains of sinus pressure/pain, headaches, sinus drainage, intermittent cough, fatigue and runny nose.   Patient denies fever, chills, nausea, vomiting or diarrhea.    Symptom onset-1 week  She is taking flonase and Claritin already. She is taking tylenol PRN. She is not getting any relief.   COVD/Flu negative  Treatment per orders. Continue allergy regimen. May do sinus rinses.  Return to clinic if no improvement.     Orders:  -     POCT SARS-CoV-2 Antigen ERIK + Flu  -     amoxicillin-clavulanate (AUGMENTIN) 875-125 MG per tablet; Take 1 tablet by mouth 2 (Two) Times a Day for 10 days. Take with food.  Dispense: 20 tablet; Refill: 0  -     predniSONE (DELTASONE) 20 MG tablet; Take 1 tablet by mouth 2 (Two) Times a Day for 5 days.  Dispense: 10 tablet; Refill: 0    2. Elevated blood pressure reading  Assessment & Plan:  Blood pressure typically runs better at home.  It is higher in the office.  We will continue to have her monitor her BP at home.  Report elevated readings.       3. Chronic right shoulder  pain  Assessment & Plan:  Patient complains of right shoulder pain that has been going on for a couple months. She states that she has been doing exercises and using TENS unit. She does see Dr. López.  Steroid prescribed today for sinus infection and should help with shoulder pain.  If no improvement, she will f/u with ortho for possible imaging/injection           Tobacco Use: Low Risk  (12/14/2023)    Patient History     Smoking Tobacco Use: Never     Smokeless Tobacco Use: Never     Passive Exposure: Never       Follow Up     Return if symptoms worsen or fail to improve.    Please note that portions of this note were completed with a voice recognition program.    Patient was given instructions and counseling regarding her condition or for health maintenance advice. Please see specific information pulled into the AVS if appropriate.   I have reviewed information obtained and documented by others and I have confirmed the accuracy of this documented note.    MARILYN Antoine

## 2023-12-15 ENCOUNTER — TELEPHONE (OUTPATIENT)
Dept: INTERNAL MEDICINE | Facility: CLINIC | Age: 58
End: 2023-12-15
Payer: OTHER GOVERNMENT

## 2023-12-15 DIAGNOSIS — M25.511 ACUTE PAIN OF RIGHT SHOULDER: Primary | ICD-10-CM

## 2023-12-15 NOTE — TELEPHONE ENCOUNTER
Pt states that she was seen yesterday and she is wanting to be referred to Dr. López for her right shoulder, She has  so we will need to do a referral and a  referral.   I have the epic referral ready for you if you agree.

## 2023-12-20 NOTE — PROGRESS NOTES
Chief Complaint: Urinary Incontinence    Subjective         History of Present Illness  Dorina Basilio is a 58 y.o. female presents to [facility] to be seen for follow-up.    Patient was previously seen by me with last visit on 11/10/2023 for urge incontinence and frequent urination.  At that visit her blood pressure was elevated so Myrbetriq was stopped and she was started on Gemtesa.  She is here for follow-up.    She sees some improvement, but also states she is inside drinking more fluids than normal.     Previous 11/10/2023:  Patient was seen by me with last visit on 8/7/2023 for recurrent urination and urge incontinence.  She was doing well on Myrbetriq 50 mg.  She is here for follow-up.     She has been off Myrbetriq since she ran out of samples. It was helping with her symptoms.  She is now back to having urinary frequency and urgency.  She does however report that the pelvic floor physical therapy has been helpful with increasing her muscle strength so that she is able to hold it when she does have urgency until she can get to the bathroom.     Previous 8/7/2023:  Patient was previously seen by me with last visit on 5/10/2023 for hematuria and urge incontinence.  She was put on oxybutynin which she took sporadically, however when she had her cystoscopy completed with Dr. Condon she advised him that it was just causing for too much dry mouth and she was unable to continue it so she was given samples of Myrbetriq.  She is here for follow-up. She reports she is doing well with Myrbetriq but needs to get this through her pharmacy. She needs samples today to get her through until she gets the medication from her pharmacy.  Her PCP did order the medication, so they will need to process the prior authorization.        Previous 5/10/2023:  Patient was previously seen by me with last appointment being on 4/4/2023.  At that visit she was started on oxybutynin.  We are also doing a work-up  "for hematuria.  She is here for follow-up PVR. She reports she is taking the medication sporadically. She takes it only when she is going to be out of the home for an extended period of time. She reports it does work well when she takes it.      Previous 4/4/2023:  Patient was seen by PCP on 3/7/2023 with a diagnosis of hematuria and was referred here for further evaluation.  She also reported at that time that she had \"vaginal mesh \"in 2016 but has been having trouble with her bladder again.  She was seen by another primary care provider in that office on 3/31 and discussed that she was having difficulty with urinary urgency with incontinence.     Frequency- every 1/2 hour- states she does drink a lot of water   Urgency- admits   Incontinence- with urgency   Nocturia- 3-4   Stream- normal   GH- denies    surgeries- 2016 vaginal mesh- had THEODORA prior   Family history of  malignancy- denies   Cardiopulmonary- denies   Anticoagulants- denies   Smoker- denies   History of kidney stones- last was in 2009, passed spontaneously   Drinks water, cranberry juice, coffee x 1-2 cups per day   UA with microscopy  2/21/2023 6-12 RBCs per high-powered field  3/1/2022 0-2 RBCs per high-powered field     Mom had ovarian CA           Procedures per Dr. Condon on 6/26/2023.        Urinalysis was checked today and was negative for signs of infection        Cytoscopy Procedure:      Procedure: Flexible cytoscope was passed per urethra into the bladder without difficulty after proper consent. The bladder was inspected in a systematic meridian fashion.         Normal bladder, some minor cystitis cystica.  No pathology        There were no tumors, lesions, stones, or other abnormalities noted within the bladder. Of note, there was no increased vascularity as well. Both ureteral orifices were identified and were normal in appearance. The flexible cytoscope was removed. The patient tolerated the procedure well.       exam - normal " external vaginal exam, on speculum exam no signs of prolapse or mesh erosion.           6/23 CT pelvis with and without - negative.  6/23 CT abdomen/pelvis without - negative        4/23 NP -oxybutynin-taking sporadically, works but causes her mouth to be too dry to continue to take this.  Vaginal mesh 2016 2/23 0.8, GFR   77     PVR     5/20 000     Frequency- every 1/2 hour- states she does drink a lot of water   Urgency- admits   Incontinence- with urgency   Nocturia- 3-4   Stream- normal   GH- denies    surgeries- 2016 vaginal mesh- had THEODORA prior   Family history of  malignancy- denies   Cardiopulmonary- denies   Anticoagulants- denies   Smoker- denies   History of kidney stones- last was in 2009, passed spontaneously   Drinks water, cranberry juice, coffee x 1-2 cups per day   UA with microscopy  2/21/2023 6-12 RBCs per high-powered field  3/1/2022 0-2 RBCs per high-powered field           Urgency  Microhematuria     Cystoscopy negative, patient given reassurance     Stop oxybutynin causing severe dry's     Continue pelvic physical therapy     Myrbetriq 50 mg samples given today, risk and benefits discussed     Follow-up with NP           This document has been electronically signed by Kedar Condon MD  June 24, 2023 06:43 EDT             Objective     Past Medical History:   Diagnosis Date    Abnormal Pap smear of cervix     Acid reflux     Allergic     seasonal    Bladder disorder     THEODORA/URGENCY    Cholelithiasis     removed    Hypothyroidism     Kidney stone     Migraines     Pap smear for cervical cancer screening 01/2020    normal    Primary hypertension 7/31/2023    Seasonal allergies        Past Surgical History:   Procedure Laterality Date    CARPAL TUNNEL RELEASE Right 2016    CHOLECYSTECTOMY  2003    COLONOSCOPY  2016    NORMAL    CYST REMOVAL  1979    throat    CYST REMOVAL Right 2013    ganglion    DIAGNOSTIC LAPAROSCOPY      ENDOMETRIAL ABLATION      GALLBLADDER SURGERY  2003    KNEE  ARTHROSCOPY Right     LAPAROSCOPIC TUBAL LIGATION      FULGERATION    OTHER SURGICAL HISTORY  1992    Chromoendoscopy of upper or lower gastrointestinal tract    TONSILLECTOMY  1969    TOOTH EXTRACTION  1978    TRIGGER FINGER RELEASE Right 2016    4TH FINGER    TRIGGER FINGER RELEASE Right 04/28/2022    Procedure: RIGHT TRIGGER FINGER  RELEASE (long/middle finger);  Surgeon: Vladimir López MD;  Location: [facility] OR [facility] [place];  Service: Orthopedics;  Laterality: Right;    TUBAL ABDOMINAL LIGATION      UMBILICAL HERNIA REPAIR  2008    VULVA SURGERY  1997         Current Outpatient Medications:     amoxicillin-clavulanate (AUGMENTIN) 875-125 MG per tablet, Take 1 tablet by mouth 2 (Two) Times a Day for 10 days. Take with food., Disp: 20 tablet, Rfl: 0    azelastine (OPTIVAR) 0.05 % ophthalmic solution, Administer 1 drop to both eyes 2 (Two) Times a Day., Disp: 18 mL, Rfl: 1    fluticasone (FLONASE) 50 MCG/ACT nasal spray, 2 sprays into the nostril(s) as directed by provider Daily., Disp: 48 g, Rfl: 1    levothyroxine (SYNTHROID, LEVOTHROID) 25 MCG tablet, Take 1 tablet by mouth Every Morning., Disp: 90 tablet, Rfl: 1    loratadine (CLARITIN) 10 MG tablet, Take 1 tablet by mouth Daily., Disp: 90 tablet, Rfl: 1    losartan (Cozaar) 25 MG tablet, Take 1 tablet by mouth Daily., Disp: 90 tablet, Rfl: 0    minocycline (MINOCIN,DYNACIN) 100 MG capsule, TAKE 1 CAPSULE BY MOUTH EVERY DAY AS NEEDED FOR FLARES, Disp: , Rfl:     omeprazole (priLOSEC) 20 MG capsule, Take 1 capsule by mouth Daily., Disp: 90 capsule, Rfl: 1    Vibegron 75 MG tablet, Take 1 tablet by mouth Daily for 360 days., Disp: 90 tablet, Rfl: 3    Allergies   Allergen Reactions    Piroxicam Rash        Family History   Problem Relation Age of Onset    Diabetes Mother     Ovarian cancer Mother     Osteoporosis Mother     Arthritis Mother     Cancer Mother     Diabetes Brother     Arthritis Brother     Diabetes Sister     Diabetes Maternal Grandmother     Diabetes  "Maternal Grandfather     Stomach cancer Other         uncle    Diabetes Other         Aunt, uncle    Arthritis Brother     Diabetes Brother     Diabetes Sister     Malig Hyperthermia Neg Hx     Breast cancer Neg Hx     Uterine cancer Neg Hx     Colon cancer Neg Hx     Melanoma Neg Hx     Prostate cancer Neg Hx        Social History     Socioeconomic History    Marital status:    Tobacco Use    Smoking status: Never     Passive exposure: Never    Smokeless tobacco: Never   Vaping Use    Vaping Use: Never used   Substance and Sexual Activity    Alcohol use: Yes     Comment: have not had alcohol since the beginning of COVID    Drug use: Never    Sexual activity: Yes     Partners: Male     Birth control/protection: Surgical, Tubal ligation, Vasectomy       Vital Signs:   /90 (BP Location: Left arm, Patient Position: Sitting, Cuff Size: Adult)   Pulse 64   Ht 160 cm (63\")   Wt 80 kg (176 lb 5.9 oz)   BMI 31.24 kg/m²      Physical Exam  Vitals reviewed.   Constitutional:       Appearance: Normal appearance.   Neurological:      General: No focal deficit present.      Mental Status: She is alert and oriented to person, place, and time.   Psychiatric:         Mood and Affect: Mood normal.         Behavior: Behavior normal.          Result Review :   The following data was reviewed by: MARILYN Pinto on 12/22/2023:       Bladder Scan interpretation 12/22/2023    Estimation of residual urine via [employer] Bladder Scan  MA/nurse performing: Nikky MCNEIL MA  Residual Urine: 48 ml  Indication: Urge incontinence    Frequent urination   Position: Supine  Examination: Incremental scanning of the suprapubic area using 2.0 MHz transducer using copious amounts of acoustic gel.   Findings: An anechoic area was demonstrated which represented the bladder, with measurement of residual urine as noted. I inspected this myself. In that the residual urine was insignificant, refer to plan for treatment and " plan necessary at this time.           Procedures        Assessment and Plan    Diagnoses and all orders for this visit:    1. Urge incontinence (Primary)  -     Bladder Scan    2. Frequent urination    Given she is seeing improvement in her symptoms we will continue on the current course of therapy.  We will see her back in 6 months or sooner for new concerns.    Follow Up   Return in about 6 months (around 6/22/2024).  Patient was given instructions and counseling regarding her condition or for health maintenance advice. Please see specific information pulled into the AVS if appropriate.         This document has been electronically signed by MARILYN Pinto  December 22, 2023 08:10 EST

## 2023-12-22 ENCOUNTER — OFFICE VISIT (OUTPATIENT)
Dept: UROLOGY | Facility: CLINIC | Age: 58
End: 2023-12-22
Payer: OTHER GOVERNMENT

## 2023-12-22 VITALS
WEIGHT: 176.37 LBS | SYSTOLIC BLOOD PRESSURE: 151 MMHG | DIASTOLIC BLOOD PRESSURE: 90 MMHG | HEART RATE: 64 BPM | HEIGHT: 63 IN | BODY MASS INDEX: 31.25 KG/M2

## 2023-12-22 DIAGNOSIS — N39.41 URGE INCONTINENCE: Primary | ICD-10-CM

## 2023-12-22 DIAGNOSIS — R35.0 FREQUENT URINATION: ICD-10-CM

## 2023-12-22 LAB — URINE VOLUME: 48

## 2024-01-08 ENCOUNTER — OFFICE VISIT (OUTPATIENT)
Dept: ORTHOPEDIC SURGERY | Facility: CLINIC | Age: 59
End: 2024-01-08
Payer: OTHER GOVERNMENT

## 2024-01-08 VITALS
WEIGHT: 177.8 LBS | HEIGHT: 63 IN | HEART RATE: 66 BPM | OXYGEN SATURATION: 98 % | SYSTOLIC BLOOD PRESSURE: 135 MMHG | DIASTOLIC BLOOD PRESSURE: 88 MMHG | BODY MASS INDEX: 31.5 KG/M2

## 2024-01-08 DIAGNOSIS — M25.511 RIGHT SHOULDER PAIN, UNSPECIFIED CHRONICITY: Primary | ICD-10-CM

## 2024-01-08 DIAGNOSIS — M75.81 ROTATOR CUFF TENDONITIS, RIGHT: ICD-10-CM

## 2024-01-08 DIAGNOSIS — M75.41 IMPINGEMENT SYNDROME OF RIGHT SHOULDER: ICD-10-CM

## 2024-01-08 PROCEDURE — 20610 DRAIN/INJ JOINT/BURSA W/O US: CPT | Performed by: ORTHOPAEDIC SURGERY

## 2024-01-08 PROCEDURE — 99213 OFFICE O/P EST LOW 20 MIN: CPT | Performed by: ORTHOPAEDIC SURGERY

## 2024-01-08 RX ORDER — TRIAMCINOLONE ACETONIDE 40 MG/ML
40 INJECTION, SUSPENSION INTRA-ARTICULAR; INTRAMUSCULAR
Status: COMPLETED | OUTPATIENT
Start: 2024-01-08 | End: 2024-01-08

## 2024-01-08 RX ORDER — LIDOCAINE HYDROCHLORIDE 10 MG/ML
5 INJECTION, SOLUTION INFILTRATION; PERINEURAL
Status: COMPLETED | OUTPATIENT
Start: 2024-01-08 | End: 2024-01-08

## 2024-01-08 RX ORDER — DICLOFENAC SODIUM 75 MG/1
75 TABLET, DELAYED RELEASE ORAL 2 TIMES DAILY
Qty: 60 TABLET | Refills: 1 | Status: SHIPPED | OUTPATIENT
Start: 2024-01-08

## 2024-01-08 RX ADMIN — TRIAMCINOLONE ACETONIDE 40 MG: 40 INJECTION, SUSPENSION INTRA-ARTICULAR; INTRAMUSCULAR at 10:32

## 2024-01-08 RX ADMIN — LIDOCAINE HYDROCHLORIDE 5 ML: 10 INJECTION, SOLUTION INFILTRATION; PERINEURAL at 10:32

## 2024-01-08 NOTE — PROGRESS NOTES
"Chief Complaint  Initial Evaluation of the Right Shoulder     Subjective      Dorina Basilio presents to [facility] for initial evaluation of the right shoulder. She has been having pain for about 4 months.  She drained and closed up her pool, that is the only thing that she can think that started this.  She has pain in the bicipital groove and some pain around the rotator cuff on the anterior aspect of the shoulder.     Allergies   Allergen Reactions    Piroxicam Rash        Social History     Socioeconomic History    Marital status:    Tobacco Use    Smoking status: Never     Passive exposure: Never    Smokeless tobacco: Never   Vaping Use    Vaping Use: Never used   Substance and Sexual Activity    Alcohol use: Yes     Comment: have not had alcohol since the beginning of COVID    Drug use: Never    Sexual activity: Yes     Partners: Male     Birth control/protection: Surgical, Tubal ligation, Vasectomy        I reviewed the patient's chief complaint, history of present illness, review of systems, past medical history, surgical history, family history, social history, medications, and allergy list.     Review of Systems     Constitutional: Denies fevers, chills, weight loss  Cardiovascular: Denies chest pain, shortness of breath  Skin: Denies rashes, acute skin changes  Neurologic: Denies headache, loss of consciousness        Vital Signs:   /88   Pulse 66   Ht 160 cm (63\")   Wt 80.6 kg (177 lb 12.8 oz)   SpO2 98%   BMI 31.50 kg/m²          Physical Exam  General: Alert. No acute distress    Ortho Exam        RIGHT SHOULDER Forward flexion 180. Abduction 170. External rotation 60. Internal rotation L5. Positive Cross body adduction. Supraspinatus strength 5/5. Infraspinatus Strength 5/5. Infrared subscap 5/5. Positive Pedroza. Positive Neer. Negative Apprehension. Negative Lift off. (Negative Obriens. Sensation intact to light touch, median, radial, ulnar nerve. " Positive AIN, PIN, ulnar nerve motor. Positive pulses. Positive Impingement signs. Good strength in triceps, biceps, deltoid, wrist extensors and wrist flexors. Tender to palpation to the bicipital groove.  She has some pain in the shoulder and down the arm.  Positive Drop Arm test and mildly positive Empty Can Test       Large Joint Arthrocentesis: R subacromial bursa  Date/Time: 1/8/2024 10:32 AM  Consent given by: patient  Site marked: site marked  Timeout: Immediately prior to procedure a time out was called to verify the correct patient, procedure, equipment, support staff and site/side marked as required   Supporting Documentation  Indications: pain   Procedure Details  Location: shoulder - R subacromial bursa  Needle size: 22 G  Medications administered: 40 mg triamcinolone acetonide 40 MG/ML; 5 mL lidocaine 1 %  Patient tolerance: patient tolerated the procedure well with no immediate complications          Imaging Results (Most Recent)       Procedure Component Value Units Date/Time    XR Scapula Right [[id number]] Resulted: 01/08/24 0957     Updated: 01/08/24 1014             Result Review :     X-Ray Report:  Right scapula X-Ray  Indication: Evaluation of the right scapula  AP/Lateral view(s)  Findings: Type 2 acromion.  Mild arthritis.   Prior studies available for comparison: yes           Assessment and Plan     Diagnoses and all orders for this visit:    1. Right shoulder pain, unspecified chronicity (Primary)  -     XR Scapula Right    2. Impingement syndrome of right shoulder    3. Rotator cuff tendonitis, right        Discussed the treatment plan with the patient. I reviewed the X-rays that were obtained today with the patient.     Discussed the risks and benefits of conservative measures. The patient expressed understanding and wished to proceed with a right shoulder steroid injection.  She tolerated the injection well.     Prescribed physical therapy. Prescribed anti inflammatory.       Call or  return if worsening symptoms.    Follow Up     4-6 weeks to assess and and if injection was helpful.       Patient was given instructions and counseling regarding her condition or for health maintenance advice. Please see specific information pulled into the AVS if appropriate.     Scribed for Vladimir López MD by Coral Enciso MA.  01/08/24   10:08 EST      I have personally performed the services described in this document as scribed by the above individual and it is both accurate and complete. Vladimir López MD 01/08/24

## 2024-02-05 ENCOUNTER — OFFICE VISIT (OUTPATIENT)
Dept: ORTHOPEDIC SURGERY | Facility: CLINIC | Age: 59
End: 2024-02-05
Payer: OTHER GOVERNMENT

## 2024-02-05 VITALS
BODY MASS INDEX: 31.36 KG/M2 | WEIGHT: 177 LBS | HEIGHT: 63 IN | HEART RATE: 70 BPM | SYSTOLIC BLOOD PRESSURE: 148 MMHG | DIASTOLIC BLOOD PRESSURE: 82 MMHG | OXYGEN SATURATION: 98 %

## 2024-02-05 DIAGNOSIS — M25.511 ACUTE PAIN OF RIGHT SHOULDER: Primary | ICD-10-CM

## 2024-02-05 PROCEDURE — 99213 OFFICE O/P EST LOW 20 MIN: CPT

## 2024-02-05 NOTE — PATIENT INSTRUCTIONS
Discussed treatment plan of care with patient.  Discussed that steroid injections have a 3-month duration between injections.  Continue home exercises.  May continue anti-inflammatory if she feels it is necessary and helping.    Patient may follow-up as needed for worsening symptoms.  Call for questions or concerns.

## 2024-02-05 NOTE — PROGRESS NOTES
"Chief Complaint  Follow-up of the Right Shoulder    Subjective      Dorina Basilio presents to [facility] for follow-up of right shoulder pain that she has had for approximately 5 months.  Patient reports that the only thing she could think of that would cause the pain is that she drained and closed up her pool.  Patient received a steroid injection on 1/8/2024 and reports an 100% improvement in her symptoms.  Patient reports that she is no longer taking the anti-inflammatories.  Reports that she has no pain and no problems at all.  She has full range of motion.    Objective   Allergies   Allergen Reactions    Piroxicam Rash       Vital Signs:   /82   Pulse 70   Ht 160 cm (63\")   Wt 80.3 kg (177 lb)   SpO2 98%   BMI 31.35 kg/m²     Please follow-up with PCP regarding blood pressure.  Physical Exam    Constitutional: Awake, alert. Well nourished appearance.    Integumentary: Warm, dry, intact. No obvious rashes.    HENT: Atraumatic, normocephalic.   Respiratory: Non labored respirations .   Cardiovascular: Intact peripheral pulses.    Psychiatric: Normal mood and affect. A&O X3    Ortho Exam  Right shoulder: Active range of motion for shoulder flexion 180, abduction 180, internal rotation T12, external rotation 90 degrees.  Full range of motion of the elbow and the wrist.  Neurovascular intact.  Sensation is intact.  Negative impingement sign.    Imaging Results (Most Recent)       None                      Assessment and Plan   Problem List Items Addressed This Visit    None  Visit Diagnoses       Acute pain of right shoulder    -  Primary            Follow Up   Return if symptoms worsen or fail to improve.    Patient is a non-smoker, did not discuss options for smoking cessation.     Social History     Socioeconomic History    Marital status:    Tobacco Use    Smoking status: Never     Passive exposure: Never    Smokeless tobacco: Never   Vaping Use    Vaping Use: " Never used   Substance and Sexual Activity    Alcohol use: Yes     Comment: have not had alcohol since the beginning of COVID    Drug use: Never    Sexual activity: Yes     Partners: Male     Birth control/protection: Surgical, Tubal ligation, Vasectomy       Patient Instructions   Discussed treatment plan of care with patient.  Discussed that steroid injections have a 3-month duration between injections.  Continue home exercises.  May continue anti-inflammatory if she feels it is necessary and helping.    Patient may follow-up as needed for worsening symptoms.  Call for questions or concerns.  Patient was given instructions and counseling regarding her condition or for health maintenance advice. Please see specific information pulled into the AVS if appropriate.

## 2024-02-29 ENCOUNTER — OFFICE VISIT (OUTPATIENT)
Dept: INTERNAL MEDICINE | Facility: CLINIC | Age: 59
End: 2024-02-29
Payer: OTHER GOVERNMENT

## 2024-02-29 VITALS
TEMPERATURE: 97.5 F | HEART RATE: 71 BPM | HEIGHT: 63 IN | SYSTOLIC BLOOD PRESSURE: 140 MMHG | OXYGEN SATURATION: 95 % | WEIGHT: 179.2 LBS | DIASTOLIC BLOOD PRESSURE: 90 MMHG | BODY MASS INDEX: 31.75 KG/M2

## 2024-02-29 DIAGNOSIS — I10 PRIMARY HYPERTENSION: ICD-10-CM

## 2024-02-29 DIAGNOSIS — R73.03 PREDIABETES: ICD-10-CM

## 2024-02-29 DIAGNOSIS — E03.9 HYPOTHYROIDISM, UNSPECIFIED TYPE: Primary | ICD-10-CM

## 2024-02-29 DIAGNOSIS — K21.9 GASTROESOPHAGEAL REFLUX DISEASE WITHOUT ESOPHAGITIS: ICD-10-CM

## 2024-02-29 DIAGNOSIS — N39.41 URGE INCONTINENCE: ICD-10-CM

## 2024-02-29 PROCEDURE — 99214 OFFICE O/P EST MOD 30 MIN: CPT

## 2024-02-29 RX ORDER — LOSARTAN POTASSIUM 25 MG/1
25 TABLET ORAL DAILY
Qty: 90 TABLET | Refills: 1 | Status: SHIPPED | OUTPATIENT
Start: 2024-02-29

## 2024-02-29 NOTE — ASSESSMENT & PLAN NOTE
Patient states that her blood pressure is fluctuating.  She does have a lot of stress at home.  Will have her start the losartan 25 mg daily.   Check blood pressure readings at home  F/u in 6 weeks.

## 2024-02-29 NOTE — ASSESSMENT & PLAN NOTE
Patient states she is having side effects of Gemtesa so she stopped it. She was having dry mouth and headaches  She is going to follow up with her neurologist.

## 2024-02-29 NOTE — PROGRESS NOTES
Chief Complaint  Follow-up (6m ), Hypertension, Acute non-recurrent pansinusitis, and Immunizations (Wants covid vacc 2024 )    Hypertension      SUBJECTIVE  Dorina Basilio presents to [facility] for a follow up on chronic disease management.     Patient is doing well overall.     She denies any chest pain ,soa, palpitations, nausea, vomiting, diarrhea.       Past Medical History:   Diagnosis Date    Abnormal Pap smear of cervix     Acid reflux     Allergic     seasonal    Bladder disorder     THEODORA/URGENCY    Cholelithiasis     removed    Hypothyroidism     Kidney stone     Migraines     Pap smear for cervical cancer screening 01/2020    normal    Primary hypertension 7/31/2023    Seasonal allergies       Family History   Problem Relation Age of Onset    Diabetes Mother     Ovarian cancer Mother     Osteoporosis Mother     Arthritis Mother     Cancer Mother     Diabetes Brother     Arthritis Brother     Diabetes Sister     Diabetes Maternal Grandmother     Diabetes Maternal Grandfather     Stomach cancer Other         uncle    Diabetes Other         Aunt, uncle    Arthritis Brother     Diabetes Brother     Diabetes Sister     Malig Hyperthermia Neg Hx     Breast cancer Neg Hx     Uterine cancer Neg Hx     Colon cancer Neg Hx     Melanoma Neg Hx     Prostate cancer Neg Hx       Past Surgical History:   Procedure Laterality Date    CARPAL TUNNEL RELEASE Right 2016    CHOLECYSTECTOMY  2003    COLONOSCOPY  2016    NORMAL    CYST REMOVAL  1979    throat    CYST REMOVAL Right 2013    ganglion    DIAGNOSTIC LAPAROSCOPY      ENDOMETRIAL ABLATION      GALLBLADDER SURGERY  2003    KNEE ARTHROSCOPY Right     LAPAROSCOPIC TUBAL LIGATION      FULGERATION    OTHER SURGICAL HISTORY  1992    Chromoendoscopy of upper or lower gastrointestinal tract    TONSILLECTOMY  1969    TOOTH EXTRACTION  1978    TRIGGER FINGER RELEASE Right 2016    4TH FINGER    TRIGGER FINGER RELEASE Right 04/28/2022     "Procedure: RIGHT TRIGGER FINGER  RELEASE (long/middle finger);  Surgeon: Vladimir López MD;  [place] [facility] [place] [facility] [place]  Service: [place]opedics;  Laterality: Right;    TUBAL ABDOMINAL LIGATION      UMBILICAL HERNIA REPAIR  2008    VULVA SURGERY  1997        Current Outpatient Medications:     azelastine (OPTIVAR) 0.05 % ophthalmic solution, Administer 1 drop to both eyes 2 (Two) Times a Day., Disp: 18 mL, Rfl: 1    diclofenac (VOLTAREN) 75 MG EC tablet, Take 1 tablet by mouth 2 (Two) Times a Day., Disp: 60 tablet, Rfl: 1    fluticasone (FLONASE) 50 MCG/ACT nasal spray, 2 sprays into the nostril(s) as directed by provider Daily., Disp: 48 g, Rfl: 1    levothyroxine (SYNTHROID, LEVOTHROID) 25 MCG tablet, Take 1 tablet by mouth Every Morning., Disp: 90 tablet, Rfl: 1    loratadine (CLARITIN) 10 MG tablet, Take 1 tablet by mouth Daily., Disp: 90 tablet, Rfl: 1    losartan (Cozaar) 25 MG tablet, Take 1 tablet by mouth Daily., Disp: 90 tablet, Rfl: 1    omeprazole (priLOSEC) 20 MG capsule, Take 1 capsule by mouth Daily., Disp: 90 capsule, Rfl: 1    minocycline (MINOCIN,DYNACIN) 100 MG capsule, TAKE 1 CAPSULE BY MOUTH EVERY DAY AS NEEDED FOR FLARES (Patient not taking: Reported on 2/29/2024), Disp: , Rfl:     OBJECTIVE  Vital Signs:   /90   Pulse 71   Temp 97.5 °F (36.4 °C) (Temporal)   Ht 160 cm (63\")   Wt 81.3 kg (179 lb 3.2 oz)   SpO2 95%   BMI 31.74 kg/m²    Estimated body mass index is 31.74 kg/m² as calculated from the following:    Height as of this encounter: 160 cm (63\").    Weight as of this encounter: 81.3 kg (179 lb 3.2 oz).     Wt Readings from Last 3 Encounters:   02/29/24 81.3 kg (179 lb 3.2 oz)   02/05/24 80.3 kg (177 lb)   01/08/24 80.6 kg (177 lb 12.8 oz)     BP Readings from Last 3 Encounters:   02/29/24 140/90   02/05/24 148/82   01/08/24 135/88       Physical Exam  Vitals and nursing note reviewed.   Constitutional:       Appearance: Normal appearance.   HENT:      Head: Normocephalic. "   Eyes:      Extraocular Movements: Extraocular movements intact.      Conjunctiva/sclera: Conjunctivae normal.   Cardiovascular:      Rate and Rhythm: Normal rate and regular rhythm.      Heart sounds: Normal heart sounds. No murmur heard.  Pulmonary:      Effort: Pulmonary effort is normal.      Breath sounds: Normal breath sounds. No wheezing or rales.   Abdominal:      General: Bowel sounds are normal.      Palpations: Abdomen is soft.   Musculoskeletal:         General: No swelling. Normal range of motion.   Skin:     General: Skin is warm and dry.   Neurological:      General: No focal deficit present.      Mental Status: She is alert and oriented to person, place, and time. Mental status is at baseline.   Psychiatric:         Mood and Affect: Mood normal.         Behavior: Behavior normal.         Thought Content: Thought content normal.         Judgment: Judgment normal.          Result Review        No Images in the past 120 days found..     The above data has been reviewed by MARILYN Antoine 02/29/2024 08:50 EST.          Patient Care Team:  Michelle Martinez APRN as PCP - General (Internal Medicine)  Emmy Hill APRN as Nurse Practitioner (Urology)  Kedar Condon MD as Consulting Physician (Urology)            ASSESSMENT & PLAN    Diagnoses and all orders for this visit:    1. Hypothyroidism, unspecified type (Primary)  Assessment & Plan:  Labs pending.       2. Prediabetes  Assessment & Plan:  Pending labs.      3. Gastroesophageal reflux disease without esophagitis  Assessment & Plan:  Continue with prilosec.      4. Primary hypertension  Assessment & Plan:  Patient states that her blood pressure is fluctuating.  She does have a lot of stress at home.  Will have her start the losartan 25 mg daily.   Check blood pressure readings at home  F/u in 6 weeks.     Orders:  -     losartan (Cozaar) 25 MG tablet; Take 1 tablet by mouth Daily.  Dispense: 90 tablet; Refill: 1    5. Urge  incontinence  Assessment & Plan:  Patient states she is having side effects of Gemtesa so she stopped it. She was having dry mouth and headaches  She is going to follow up with her neurologist.            Tobacco Use: Low Risk  (2/29/2024)    Patient History     Smoking Tobacco Use: Never     Smokeless Tobacco Use: Never     Passive Exposure: Never       Follow Up     Return in about 6 weeks (around 4/11/2024) for Annual physical.    Please note that portions of this note were completed with a voice recognition program.    Patient was given instructions and counseling regarding her condition or for health maintenance advice. Please see specific information pulled into the AVS if appropriate.   I have reviewed information obtained and documented by others and I have confirmed the accuracy of this documented note.    MARILYN Antoine

## 2024-03-07 ENCOUNTER — OFFICE VISIT (OUTPATIENT)
Dept: OBSTETRICS AND GYNECOLOGY | Facility: CLINIC | Age: 59
End: 2024-03-07
Payer: OTHER GOVERNMENT

## 2024-03-07 VITALS
HEART RATE: 73 BPM | HEIGHT: 63 IN | DIASTOLIC BLOOD PRESSURE: 74 MMHG | WEIGHT: 181 LBS | BODY MASS INDEX: 32.07 KG/M2 | SYSTOLIC BLOOD PRESSURE: 130 MMHG

## 2024-03-07 DIAGNOSIS — R10.2 PELVIC PAIN IN FEMALE: ICD-10-CM

## 2024-03-07 DIAGNOSIS — Z01.411 ENCOUNTER FOR GYNECOLOGICAL EXAMINATION WITH ABNORMAL FINDING: Primary | ICD-10-CM

## 2024-03-07 PROCEDURE — G0123 SCREEN CERV/VAG THIN LAYER: HCPCS | Performed by: OBSTETRICS & GYNECOLOGY

## 2024-03-07 PROCEDURE — 87624 HPV HI-RISK TYP POOLED RSLT: CPT | Performed by: OBSTETRICS & GYNECOLOGY

## 2024-03-07 NOTE — PROGRESS NOTES
"Well Woman Visit    CC: Annual well woman exam       HPI:   58 y.o. Contraception or HRT: Post menopausal  Menses:   none  Pain:   right sided pelvic pain x 6 months-1 year, intermittent, sharp/stabbing, mom had ovarian cancer  Incontinence concerns: No  Hx of abnormal pap:  No      History: PMHx, Meds, Allergies, PSHx, Social Hx, and POBHx all reviewed and updated.      PHYSICAL EXAM:  /74   Pulse 73   Ht 160 cm (63\")   Wt 82.1 kg (181 lb)   BMI 32.06 kg/m²   General- NAD, alert and oriented, appropriate  Psych- Normal mood, good memory  Neck- No masses, no thyroid enlargement  CV- Regular rhythm, no murnurs  Resp- CTA to bases, no wheezes  Abdomen- Soft, non distended, non tender, no masses    Breast left-  Bilaterally symmetrical, no masses, non tender, no nipple discharge  Breast right- Bilaterally symmetrical, no masses, non tender, no nipple discharge    External genitalia- Normal female, no lesions  Urethra/meatus- Normal, no masses, non tender, no prolapse  Bladder- Normal, no masses, non tender, no prolapse  Vagina- Normal, no atrophy, no lesions, no discharge, no prolapse  Cvx- Normal, no lesions, no discharge, No cervical motion tenderness  Uterus- Normal size, shape & consistency.  Non tender, mobile.  Adnexa- No mass, non tender  Anus/Rectum/Perineum- Normal appearance, no mass, good sphincter tone, no hemorrhoids, no prolapse , Hemoccult neg    Lymphatic- No palpable neck, axillary, or groin nodes  Ext- No edema, no cyanosis    Skin- No lesions, no rashes, no acanthosis nigricans        ASSESSMENT and PLAN:  WWE and Problem Visit    Diagnoses and all orders for this visit:    1. Encounter for gynecological examination with abnormal finding (Primary)  -     POC Occult Blood Stool  -     Mammo Screening Digital Tomosynthesis Bilateral With CAD; Future  -     IgP, Aptima HPV    2. Pelvic pain in female  -     US Non-ob Transvaginal; Future        Domestic violence/abuse screen: " negative    Depression screen: no SI    Preventative:   BREAST HEALTH- Monthly self breast exam importance and how to reviewed. MMG and/or MRI (prn) reviewed per society guidelines and her individual history. Mammo/MRI screen: Updated today.  CERVICAL CANCER Screening- Reviewed current ASCCP guidelines for screening w and wo cotest HPV, age specific.  Screen: Updated today.  COLON CANCER Screening- Reviewed current medical society guidelines and options.  Colonoscopy screen:  Already up to date.  SEXUAL HEALTH: Declines STD screening.  VACCINATIONS Recommended: Flu annually, Zoster (49yo and older).  Importance discussed, risk being unvaccinated reviewed.  Questions answered  Smoking status- NON SMOKER.  Importance of avoiding second hand smoke.  Follow up PCP/Specialist PMHx and Labs  Myriad : pt reports negative testing previously done      She understands the importance of having any ordered tests to be performed in a timely fashion.  She is encouraged to review her results online and/or contact or office if she has questions.     Follow Up:  Return in about 1 year (around 3/7/2025) for Annual physical.      Candelaria Nelson, APRN  03/07/2024

## 2024-03-11 DIAGNOSIS — M75.41 IMPINGEMENT SYNDROME OF RIGHT SHOULDER: ICD-10-CM

## 2024-03-11 DIAGNOSIS — M25.511 RIGHT SHOULDER PAIN, UNSPECIFIED CHRONICITY: ICD-10-CM

## 2024-03-11 DIAGNOSIS — M75.81 ROTATOR CUFF TENDONITIS, RIGHT: ICD-10-CM

## 2024-03-11 RX ORDER — DICLOFENAC SODIUM 75 MG/1
75 TABLET, DELAYED RELEASE ORAL 2 TIMES DAILY
Qty: 60 TABLET | Refills: 1 | Status: SHIPPED | OUTPATIENT
Start: 2024-03-11

## 2024-03-12 LAB
CYTOLOGIST CVX/VAG CYTO: NORMAL
CYTOLOGY CVX/VAG DOC CYTO: NORMAL
CYTOLOGY CVX/VAG DOC THIN PREP: NORMAL
DX ICD CODE: NORMAL
HPV I/H RISK 4 DNA CVX QL PROBE+SIG AMP: NEGATIVE
Lab: NORMAL
Lab: NORMAL
OTHER STN SPEC: NORMAL
STAT OF ADQ CVX/VAG CYTO-IMP: NORMAL

## 2024-03-18 DIAGNOSIS — E03.9 HYPOTHYROIDISM, UNSPECIFIED TYPE: ICD-10-CM

## 2024-03-18 RX ORDER — LEVOTHYROXINE SODIUM 0.03 MG/1
25 TABLET ORAL EVERY MORNING
Qty: 90 TABLET | Refills: 3 | OUTPATIENT
Start: 2024-03-18

## 2024-03-18 RX ORDER — LEVOTHYROXINE SODIUM 0.03 MG/1
25 TABLET ORAL EVERY MORNING
Qty: 90 TABLET | Refills: 1 | Status: SHIPPED | OUTPATIENT
Start: 2024-03-18

## 2024-03-28 ENCOUNTER — HOSPITAL ENCOUNTER (OUTPATIENT)
Dept: ULTRASOUND IMAGING | Facility: HOSPITAL | Age: 59
Discharge: HOME | End: 2024-03-28
Admitting: OBSTETRICS & GYNECOLOGY
Payer: OTHER GOVERNMENT

## 2024-03-28 DIAGNOSIS — R10.2 PELVIC PAIN IN FEMALE: ICD-10-CM

## 2024-03-28 PROCEDURE — 76830 TRANSVAGINAL US NON-OB: CPT

## 2024-04-15 ENCOUNTER — LAB (OUTPATIENT)
Dept: LAB | Facility: HOSPITAL | Age: 59
End: 2024-04-15
Payer: OTHER GOVERNMENT

## 2024-04-15 DIAGNOSIS — E03.9 HYPOTHYROIDISM, UNSPECIFIED TYPE: ICD-10-CM

## 2024-04-15 DIAGNOSIS — I10 PRIMARY HYPERTENSION: ICD-10-CM

## 2024-04-15 DIAGNOSIS — R73.03 PREDIABETES: ICD-10-CM

## 2024-04-15 LAB
ALBUMIN SERPL-MCNC: 4.3 G/DL (ref 3.5–5.2)
ALBUMIN/GLOB SERPL: 1.9 G/DL
ALP SERPL-CCNC: 95 U/L (ref 39–117)
ALT SERPL W P-5'-P-CCNC: 33 U/L (ref 1–33)
ANION GAP SERPL CALCULATED.3IONS-SCNC: 7.9 MMOL/L (ref 5–15)
AST SERPL-CCNC: 12 U/L (ref 1–32)
BASOPHILS # BLD AUTO: 0.03 10*3/MM3 (ref 0–0.2)
BASOPHILS NFR BLD AUTO: 0.6 % (ref 0–1.5)
BILIRUB SERPL-MCNC: <0.2 MG/DL (ref 0–1.2)
BUN SERPL-MCNC: 15 MG/DL (ref 8–23)
BUN/CREAT SERPL: 16.3 (ref 7–25)
CALCIUM SPEC-SCNC: 9.4 MG/DL (ref 8.6–10.5)
CHLORIDE SERPL-SCNC: 108 MMOL/L (ref 98–107)
CHOLEST SERPL-MCNC: 194 MG/DL (ref 0–200)
CO2 SERPL-SCNC: 26.1 MMOL/L (ref 22–29)
CREAT SERPL-MCNC: 0.92 MG/DL (ref 0.57–1)
DEPRECATED RDW RBC AUTO: 40.3 FL (ref 37–54)
EGFRCR SERPLBLD CKD-EPI 2021: 71.4 ML/MIN/1.73
EOSINOPHIL # BLD AUTO: 0.64 10*3/MM3 (ref 0–0.4)
EOSINOPHIL NFR BLD AUTO: 12.9 % (ref 0.3–6.2)
ERYTHROCYTE [DISTWIDTH] IN BLOOD BY AUTOMATED COUNT: 12.8 % (ref 12.3–15.4)
GLOBULIN UR ELPH-MCNC: 2.3 GM/DL
GLUCOSE SERPL-MCNC: 111 MG/DL (ref 65–99)
HBA1C MFR BLD: 6.3 % (ref 4.8–5.6)
HCT VFR BLD AUTO: 39.9 % (ref 34–46.6)
HDLC SERPL-MCNC: 53 MG/DL (ref 40–60)
HGB BLD-MCNC: 13.2 G/DL (ref 12–15.9)
IMM GRANULOCYTES # BLD AUTO: 0.01 10*3/MM3 (ref 0–0.05)
IMM GRANULOCYTES NFR BLD AUTO: 0.2 % (ref 0–0.5)
LDLC SERPL CALC-MCNC: 123 MG/DL (ref 0–100)
LDLC/HDLC SERPL: 2.28 {RATIO}
LYMPHOCYTES # BLD AUTO: 1.62 10*3/MM3 (ref 0.7–3.1)
LYMPHOCYTES NFR BLD AUTO: 32.6 % (ref 19.6–45.3)
MCH RBC QN AUTO: 28.9 PG (ref 26.6–33)
MCHC RBC AUTO-ENTMCNC: 33.1 G/DL (ref 31.5–35.7)
MCV RBC AUTO: 87.3 FL (ref 79–97)
MONOCYTES # BLD AUTO: 0.37 10*3/MM3 (ref 0.1–0.9)
MONOCYTES NFR BLD AUTO: 7.4 % (ref 5–12)
NEUTROPHILS NFR BLD AUTO: 2.3 10*3/MM3 (ref 1.7–7)
NEUTROPHILS NFR BLD AUTO: 46.3 % (ref 42.7–76)
NRBC BLD AUTO-RTO: 0 /100 WBC (ref 0–0.2)
PLATELET # BLD AUTO: 305 10*3/MM3 (ref 140–450)
PMV BLD AUTO: 9.9 FL (ref 6–12)
POTASSIUM SERPL-SCNC: 4.6 MMOL/L (ref 3.5–5.2)
PROT SERPL-MCNC: 6.6 G/DL (ref 6–8.5)
RBC # BLD AUTO: 4.57 10*6/MM3 (ref 3.77–5.28)
SODIUM SERPL-SCNC: 142 MMOL/L (ref 136–145)
TRIGL SERPL-MCNC: 102 MG/DL (ref 0–150)
TSH SERPL DL<=0.05 MIU/L-ACNC: 3.83 UIU/ML (ref 0.27–4.2)
VLDLC SERPL-MCNC: 18 MG/DL (ref 5–40)
WBC NRBC COR # BLD AUTO: 4.97 10*3/MM3 (ref 3.4–10.8)

## 2024-04-15 PROCEDURE — 85025 COMPLETE CBC W/AUTO DIFF WBC: CPT

## 2024-04-15 PROCEDURE — 80061 LIPID PANEL: CPT

## 2024-04-15 PROCEDURE — 84443 ASSAY THYROID STIM HORMONE: CPT

## 2024-04-15 PROCEDURE — 83036 HEMOGLOBIN GLYCOSYLATED A1C: CPT

## 2024-04-15 PROCEDURE — 80053 COMPREHEN METABOLIC PANEL: CPT

## 2024-04-15 PROCEDURE — 36415 COLL VENOUS BLD VENIPUNCTURE: CPT

## 2024-04-19 ENCOUNTER — OFFICE VISIT (OUTPATIENT)
Dept: INTERNAL MEDICINE | Age: 59
End: 2024-04-19
Payer: OTHER GOVERNMENT

## 2024-04-19 VITALS
WEIGHT: 184.4 LBS | OXYGEN SATURATION: 97 % | HEIGHT: 63 IN | SYSTOLIC BLOOD PRESSURE: 140 MMHG | BODY MASS INDEX: 32.67 KG/M2 | RESPIRATION RATE: 16 BRPM | HEART RATE: 67 BPM | TEMPERATURE: 97.8 F | DIASTOLIC BLOOD PRESSURE: 85 MMHG

## 2024-04-19 DIAGNOSIS — E03.9 HYPOTHYROIDISM, UNSPECIFIED TYPE: ICD-10-CM

## 2024-04-19 DIAGNOSIS — R74.8 ELEVATED VITAMIN B12 LEVEL: ICD-10-CM

## 2024-04-19 DIAGNOSIS — I10 PRIMARY HYPERTENSION: ICD-10-CM

## 2024-04-19 DIAGNOSIS — E55.9 VITAMIN D DEFICIENCY: ICD-10-CM

## 2024-04-19 DIAGNOSIS — J30.2 SEASONAL ALLERGIC RHINITIS, UNSPECIFIED TRIGGER: ICD-10-CM

## 2024-04-19 DIAGNOSIS — R73.03 PREDIABETES: ICD-10-CM

## 2024-04-19 DIAGNOSIS — Z00.00 ANNUAL PHYSICAL EXAM: Primary | ICD-10-CM

## 2024-04-19 DIAGNOSIS — Z23 NEED FOR COVID-19 VACCINE: ICD-10-CM

## 2024-04-19 PROBLEM — R03.0 ELEVATED BLOOD PRESSURE READING: Status: RESOLVED | Noted: 2023-03-31 | Resolved: 2024-04-19

## 2024-04-19 RX ORDER — VIBEGRON 75 MG/1
75 TABLET, FILM COATED ORAL DAILY
COMMUNITY

## 2024-04-19 NOTE — ASSESSMENT & PLAN NOTE
Mammogram-scheduled next month  Monthly SBE counseled  PAP-3/2024-normal  Colon Cancer Screening-colonoscopy 2016-q10 years  COVID-19 vaccine-pt will get today  Lipids-UTD  Shingrex-needs-counseling provided  TDAP-12/2019  Recommend regular dental/eye exams, regular exercise, healthy diet.

## 2024-04-19 NOTE — ASSESSMENT & PLAN NOTE
We discussed lifestyle modifications-patient is agreeable to make changes.  Will continue to monitor A1C

## 2024-04-19 NOTE — PROGRESS NOTES
Chief Complaint  Annual Exam    History of Present Illness  SUBJECTIVE  Dorina Basilio presents to [facility] for her annual physical exam, discuss labs, and chronic disease management.      Past Medical History:   Diagnosis Date    Abnormal Pap smear of cervix     Acid reflux     Allergic     seasonal    Bladder disorder     THEODORA/URGENCY    Cholelithiasis     removed    Hypothyroidism     Kidney stone     Migraines     Pap smear for cervical cancer screening 01/2020    normal    Primary hypertension 7/31/2023    Seasonal allergies       Family History   Problem Relation Age of Onset    Diabetes Mother     Ovarian cancer Mother     Osteoporosis Mother     Arthritis Mother     Cancer Mother     Diabetes Brother     Arthritis Brother     Diabetes Sister     Diabetes Maternal Grandmother     Diabetes Maternal Grandfather     Stomach cancer Other         uncle    Diabetes Other         Aunt, uncle    Arthritis Brother     Diabetes Brother     Diabetes Sister     Malig Hyperthermia Neg Hx     Breast cancer Neg Hx     Uterine cancer Neg Hx     Colon cancer Neg Hx     Melanoma Neg Hx     Prostate cancer Neg Hx       Past Surgical History:   Procedure Laterality Date    CARPAL TUNNEL RELEASE Right 2016    CHOLECYSTECTOMY  2003    COLONOSCOPY  2016    NORMAL    CYST REMOVAL  1979    throat    CYST REMOVAL Right 2013    ganglion    DIAGNOSTIC LAPAROSCOPY      ENDOMETRIAL ABLATION      GALLBLADDER SURGERY  2003    KNEE ARTHROSCOPY Right     LAPAROSCOPIC TUBAL LIGATION      FULGERATION    OTHER SURGICAL HISTORY  1992    Chromoendoscopy of upper or lower gastrointestinal tract    TONSILLECTOMY  1969    TOOTH EXTRACTION  1978    TRIGGER FINGER RELEASE Right 2016    4TH FINGER    TRIGGER FINGER RELEASE Right 04/28/2022    Procedure: RIGHT TRIGGER FINGER  RELEASE (long/middle finger);  Surgeon: Vladimir López MD;  Location: [facility] OR [facility] [place];  Service: Orthopedics;  Laterality: Right;    TUBAL  "ABDOMINAL LIGATION      UMBILICAL HERNIA REPAIR  2008    VULVA SURGERY  1997        Current Outpatient Medications:     azelastine (OPTIVAR) 0.05 % ophthalmic solution, Administer 1 drop to both eyes 2 (Two) Times a Day., Disp: 18 mL, Rfl: 1    diclofenac (VOLTAREN) 75 MG EC tablet, TAKE 1 TABLET BY MOUTH TWICE DAILY, Disp: 60 tablet, Rfl: 1    fluticasone (FLONASE) 50 MCG/ACT nasal spray, 2 sprays into the nostril(s) as directed by provider Daily., Disp: 48 g, Rfl: 1    levothyroxine (SYNTHROID, LEVOTHROID) 25 MCG tablet, Take 1 tablet by mouth Every Morning., Disp: 90 tablet, Rfl: 1    loratadine (CLARITIN) 10 MG tablet, Take 1 tablet by mouth Daily., Disp: 90 tablet, Rfl: 1    losartan (Cozaar) 25 MG tablet, Take 1 tablet by mouth Daily., Disp: 90 tablet, Rfl: 1    minocycline (MINOCIN,DYNACIN) 100 MG capsule, Take 1 capsule by mouth As Needed., Disp: , Rfl:     omeprazole (priLOSEC) 20 MG capsule, Take 1 capsule by mouth Daily., Disp: 90 capsule, Rfl: 1    Vibegron (Gemtesa) 75 MG tablet, Take 1 tablet by mouth Daily., Disp: , Rfl:     OBJECTIVE  Vital Signs:   /85 (BP Location: Left arm, Patient Position: Sitting, Cuff Size: Large Adult)   Pulse 67   Temp 97.8 °F (36.6 °C) (Temporal)   Resp 16   Ht 160 cm (63\")   Wt 83.6 kg (184 lb 6.4 oz)   SpO2 97%   BMI 32.66 kg/m²    Estimated body mass index is 32.66 kg/m² as calculated from the following:    Height as of this encounter: 160 cm (63\").    Weight as of this encounter: 83.6 kg (184 lb 6.4 oz).     Wt Readings from Last 3 Encounters:   04/19/24 83.6 kg (184 lb 6.4 oz)   03/07/24 82.1 kg (181 lb)   02/29/24 81.3 kg (179 lb 3.2 oz)     BP Readings from Last 3 Encounters:   04/19/24 140/85   03/07/24 130/74   02/29/24 140/90       Physical Exam  Vitals and nursing note reviewed.   Constitutional:       Appearance: Normal appearance.   HENT:      Head: Normocephalic.      Right Ear: Tympanic membrane normal.      Left Ear: Tympanic membrane normal. "   Eyes:      Extraocular Movements: Extraocular movements intact.      Conjunctiva/sclera: Conjunctivae normal.   Cardiovascular:      Rate and Rhythm: Normal rate and regular rhythm.      Heart sounds: Normal heart sounds. No murmur heard.  Pulmonary:      Effort: Pulmonary effort is normal.      Breath sounds: Normal breath sounds. No wheezing or rales.   Abdominal:      General: Bowel sounds are normal.      Palpations: Abdomen is soft.      Tenderness: There is no abdominal tenderness. There is no guarding.   Musculoskeletal:         General: No swelling. Normal range of motion.      Cervical back: Normal range of motion and neck supple.   Skin:     General: Skin is warm and dry.   Neurological:      General: No focal deficit present.      Mental Status: She is alert and oriented to person, place, and time. Mental status is at baseline.   Psychiatric:         Mood and Affect: Mood normal.         Behavior: Behavior normal.         Thought Content: Thought content normal.         Judgment: Judgment normal.          Result Review    CMP          8/11/2023    09:08 4/15/2024    08:29   CMP   Glucose 105  111    BUN 21  15    Creatinine 0.80  0.92    EGFR 84.5  71.4    Sodium 142  142    Potassium 4.4  4.6    Chloride 105  108    Calcium 9.9  9.4    Total Protein 6.9  6.6    Albumin 4.5  4.3    Globulin 2.4  2.3    Total Bilirubin 0.2  <0.2    Alkaline Phosphatase 94  95    AST (SGOT) 14  12    ALT (SGPT) 12  33    Albumin/Globulin Ratio 1.9  1.9    BUN/Creatinine Ratio 26.3  16.3    Anion Gap 7.0  7.9      CBC w/diff          8/11/2023    09:08 4/15/2024    08:29   CBC w/Diff   WBC 5.58  4.97    RBC 4.75  4.57    Hemoglobin 13.4  13.2    Hematocrit 41.2  39.9    MCV 86.7  87.3    MCH 28.2  28.9    MCHC 32.5  33.1    RDW 12.7  12.8    Platelets 343  305    Neutrophil Rel % 60.3  46.3    Immature Granulocyte Rel % 0.2  0.2    Lymphocyte Rel % 27.1  32.6    Monocyte Rel % 7.2  7.4    Eosinophil Rel % 4.3  12.9     Basophil Rel % 0.9  0.6      Lipid Panel          8/11/2023    09:08 4/15/2024    08:29   Lipid Panel   Total Cholesterol 187  194    Triglycerides 77  102    HDL Cholesterol 70  53    VLDL Cholesterol 14  18    LDL Cholesterol  103  123    LDL/HDL Ratio 1.45  2.28      TSH          8/11/2023    09:08 4/15/2024    08:29   TSH   TSH 2.060  3.830      Most Recent A1C          4/15/2024    08:29   HGBA1C Most Recent   Hemoglobin A1C 6.30        A1C Last 3 Results          8/11/2023    09:08 4/15/2024    08:29   HGBA1C Last 3 Results   Hemoglobin A1C 5.80  6.30      Lab Results   Component Value Date    AZEN52IZ 33.5 02/21/2023     Lab Results   Component Value Date    FREET4 1.08 08/11/2023     Lab Results   Component Value Date    KFOBOGAK17 1,138 (H) 02/21/2023       US Non-ob Transvaginal    Result Date: 3/28/2024  Impression: 1.  Hypoechoic area within the myometrium in the body the uterus the might relate to cystic degeneration of a fibroid. 2.  Heterogeneous echotexture of myometrium that could be reflective of adenomyosis    Electronically Signed By-Kunal Sandoval MD On:3/28/2024 9:08 PM         The above data has been reviewed by MARILYN Antoine 04/19/2024 11:11 EDT.          Patient Care Team:  Michelle Martinez APRN as PCP - General (Internal Medicine)  Emmy Hill APRN as Nurse Practitioner (Urology)  Kedar Condon MD as Consulting Physician (Urology)    BMI is >= 30 and <35. (Class 1 Obesity). The following options were offered after discussion;: exercise counseling/recommendations and nutrition counseling/recommendations       ASSESSMENT & PLAN    Diagnoses and all orders for this visit:    1. Annual physical exam (Primary)  Assessment & Plan:  Mammogram-scheduled next month  Monthly SBE counseled  PAP-3/2024-normal  Colon Cancer Screening-colonoscopy 2016-q10 years  COVID-19 vaccine-pt will get today  Lipids-UTD  Shingrex-needs-counseling provided  TDAP-12/2019  Recommend regular dental/eye  exams, regular exercise, healthy diet.      2. Prediabetes  Assessment & Plan:  We discussed lifestyle modifications-patient is agreeable to make changes.  Will continue to monitor A1C    Orders:  -     Hemoglobin A1c; Future  -     Microalbumin / Creatinine Urine Ratio - Urine, Clean Catch; Future    3. Primary hypertension  Assessment & Plan:  Blood pressure is elevated today in the office.  Blood pressure is running 140/80's at home.  Will increase her losartan to 50 mg daily.  We also discussed dietary modifications.       Orders:  -     CBC & Differential; Future  -     Comprehensive Metabolic Panel; Future  -     Lipid Panel; Future  -     TSH Rfx On Abnormal To Free T4; Future    4. Seasonal allergic rhinitis, unspecified trigger  Assessment & Plan:  We discussed trying out Xyzal and stopping claritin to see if that helps with allergy symptoms.        5. Hypothyroidism, unspecified type  Assessment & Plan:  Thyroid function is normal  Continue current regimen.     Orders:  -     CBC & Differential; Future  -     Comprehensive Metabolic Panel; Future  -     Lipid Panel; Future  -     TSH Rfx On Abnormal To Free T4; Future    6. Vitamin D deficiency  -     Vitamin D,25-Hydroxy; Future    7. Elevated vitamin B12 level  -     Vitamin B12 & Folate; Future         Tobacco Use: Low Risk  (4/19/2024)    Patient History     Smoking Tobacco Use: Never     Smokeless Tobacco Use: Never     Passive Exposure: Never       Follow Up     Return in about 6 months (around 10/19/2024).    Please note that portions of this note were completed with a voice recognition program.    Patient was given instructions and counseling regarding her condition or for health maintenance advice. Please see specific information pulled into the AVS if appropriate.   I have reviewed information obtained and documented by others and I have confirmed the accuracy of this documented note.    MARILYN Antoine

## 2024-04-19 NOTE — ASSESSMENT & PLAN NOTE
Blood pressure is elevated today in the office.  Blood pressure is running 140/80's at home.  Will increase her losartan to 50 mg daily.  We also discussed dietary modifications.

## 2024-04-25 ENCOUNTER — OFFICE VISIT (OUTPATIENT)
Dept: OBSTETRICS AND GYNECOLOGY | Facility: CLINIC | Age: 59
End: 2024-04-25
Payer: OTHER GOVERNMENT

## 2024-04-25 VITALS
DIASTOLIC BLOOD PRESSURE: 82 MMHG | HEART RATE: 64 BPM | BODY MASS INDEX: 33.52 KG/M2 | SYSTOLIC BLOOD PRESSURE: 136 MMHG | WEIGHT: 189.2 LBS | HEIGHT: 63 IN

## 2024-04-25 DIAGNOSIS — R10.2 PELVIC PAIN IN FEMALE: Primary | ICD-10-CM

## 2024-05-10 ENCOUNTER — HOSPITAL ENCOUNTER (OUTPATIENT)
Dept: MAMMOGRAPHY | Facility: HOSPITAL | Age: 59
Discharge: HOME | End: 2024-05-10
Admitting: OBSTETRICS & GYNECOLOGY
Payer: OTHER GOVERNMENT

## 2024-05-10 DIAGNOSIS — Z01.411 ENCOUNTER FOR GYNECOLOGICAL EXAMINATION WITH ABNORMAL FINDING: ICD-10-CM

## 2024-05-10 PROCEDURE — 77067 SCR MAMMO BI INCL CAD: CPT

## 2024-05-10 PROCEDURE — 77063 BREAST TOMOSYNTHESIS BI: CPT

## 2024-05-12 DIAGNOSIS — M75.81 ROTATOR CUFF TENDONITIS, RIGHT: ICD-10-CM

## 2024-05-12 DIAGNOSIS — M75.41 IMPINGEMENT SYNDROME OF RIGHT SHOULDER: ICD-10-CM

## 2024-05-12 DIAGNOSIS — M25.511 RIGHT SHOULDER PAIN, UNSPECIFIED CHRONICITY: ICD-10-CM

## 2024-05-13 RX ORDER — DICLOFENAC SODIUM 75 MG/1
75 TABLET, DELAYED RELEASE ORAL 2 TIMES DAILY
Qty: 60 TABLET | Refills: 1 | Status: SHIPPED | OUTPATIENT
Start: 2024-05-13

## 2024-06-18 ENCOUNTER — TELEPHONE (OUTPATIENT)
Dept: UROLOGY | Facility: CLINIC | Age: 59
End: 2024-06-18
Payer: OTHER GOVERNMENT

## 2024-06-24 NOTE — TELEPHONE ENCOUNTER
2ND CALL - CALLED PT TO OFFER R/S OF CX'D APPT 6/19 W/ KELLY    SPOKE W/ PT AND R/S APPT    Future Appointments         Provider Department Center    7/16/2024 11:15 AM Emmy Hill [facility] UROLOGY [facility]    10/21/2024 8:15 AM Michelle [facility]    3/10/2025 9:00 AM Candelaria Nelson [facility] OBGYN SINDY

## 2024-07-12 NOTE — PROGRESS NOTES
Chief Complaint: Urge incontinence    Subjective         History of Present Illness  Dorina Basilio is a 58 y.o. female presents to [facility] UROLOG[facility] to be seen for follow-up.    Patient was previously seen by me with last visit on 12/22/2023 for urge incontinence and frequent urination.  At that visit she was continued on Gemtesa.  She is here for follow-up.    She reports Gemtesa was working but has been off of it for the past 2 months because she was out of town and ran out of refills.     She has gone back to drinking her water and gatorade along with lemon and energy boosters. She reports she is doing ok and not going as frequently as she was prior to starting medications.       Previous 12/22/2023:  Patient was previously seen by me with last visit on 11/10/2023 for urge incontinence and frequent urination.  At that visit her blood pressure was elevated so Myrbetriq was stopped and she was started on Gemtesa.  She is here for follow-up.     She sees some improvement, but also states she is inside drinking more fluids than normal.      Previous 11/10/2023:  Patient was seen by me with last visit on 8/7/2023 for recurrent urination and urge incontinence.  She was doing well on Myrbetriq 50 mg.  She is here for follow-up.     She has been off Myrbetriq since she ran out of samples. It was helping with her symptoms.  She is now back to having urinary frequency and urgency.  She does however report that the pelvic floor physical therapy has been helpful with increasing her muscle strength so that she is able to hold it when she does have urgency until she can get to the bathroom.     Previous 8/7/2023:  Patient was previously seen by me with last visit on 5/10/2023 for hematuria and urge incontinence.  She was put on oxybutynin which she took sporadically, however when she had her cystoscopy completed with Dr. Condon she advised him that it was just causing for too much dry mouth and she was  "unable to continue it so she was given samples of Myrbetriq.  She is here for follow-up. She reports she is doing well with Myrbetriq but needs to get this through her pharmacy. She needs samples today to get her through until she gets the medication from her pharmacy.  Her PCP did order the medication, so they will need to process the prior authorization.        Previous 5/10/2023:  Patient was previously seen by me with last appointment being on 4/4/2023.  At that visit she was started on oxybutynin.  We are also doing a work-up for hematuria.  She is here for follow-up PVR. She reports she is taking the medication sporadically. She takes it only when she is going to be out of the home for an extended period of time. She reports it does work well when she takes it.      Previous 4/4/2023:  Patient was seen by PCP on 3/7/2023 with a diagnosis of hematuria and was referred here for further evaluation.  She also reported at that time that she had \"vaginal mesh \"in 2016 but has been having trouble with her bladder again.  She was seen by another primary care provider in that office on 3/31 and discussed that she was having difficulty with urinary urgency with incontinence.     Frequency- every 1/2 hour- states she does drink a lot of water   Urgency- admits   Incontinence- with urgency   Nocturia- 3-4   Stream- normal   GH- denies    surgeries- 2016 vaginal mesh- had THEODORA prior   Family history of  malignancy- denies   Cardiopulmonary- denies   Anticoagulants- denies   Smoker- denies   History of kidney stones- last was in 2009, passed spontaneously   Drinks water, cranberry juice, coffee x 1-2 cups per day   UA with microscopy  2/21/2023 6-12 RBCs per high-powered field  3/1/2022 0-2 RBCs per high-powered field     Mom had ovarian CA           Procedures per Dr. Condon on 6/26/2023.        Urinalysis was checked today and was negative for signs of infection        Cytoscopy Procedure:      Procedure: Flexible " cytoscope was passed per urethra into the bladder without difficulty after proper consent. The bladder was inspected in a systematic meridian fashion.         Normal bladder, some minor cystitis cystica.  No pathology        There were no tumors, lesions, stones, or other abnormalities noted within the bladder. Of note, there was no increased vascularity as well. Both ureteral orifices were identified and were normal in appearance. The flexible cytoscope was removed. The patient tolerated the procedure well.       exam - normal external vaginal exam, on speculum exam no signs of prolapse or mesh erosion.           6/23 CT pelvis with and without - negative.  6/23 CT abdomen/pelvis without - negative        4/23 NP -oxybutynin-taking sporadically, works but causes her mouth to be too dry to continue to take this.  Vaginal mesh 2016 2/23 0.8, GFR   77     PVR     5/20 000     Frequency- every 1/2 hour- states she does drink a lot of water   Urgency- admits   Incontinence- with urgency   Nocturia- 3-4   Stream- normal   GH- denies    surgeries- 2016 vaginal mesh- had THEODORA prior   Family history of  malignancy- denies   Cardiopulmonary- denies   Anticoagulants- denies   Smoker- denies   History of kidney stones- last was in 2009, passed spontaneously   Drinks water, cranberry juice, coffee x 1-2 cups per day   UA with microscopy  2/21/2023 6-12 RBCs per high-powered field  3/1/2022 0-2 RBCs per high-powered field           Urgency  Microhematuria     Cystoscopy negative, patient given reassurance     Stop oxybutynin causing severe dry's     Continue pelvic physical therapy     Myrbetriq 50 mg samples given today, risk and benefits discussed     Follow-up with NP           This document has been electronically signed by Kedar Condon MD  June 24, 2023 06:43 EDT               Objective     Past Medical History:   Diagnosis Date    Abnormal Pap smear of cervix     Acid reflux     Allergic     seasonal     Bladder disorder     THEODORA/URGENCY    Cholelithiasis     removed    Hypothyroidism     Kidney stone     Migraines     Pap smear for cervical cancer screening 01/2020    normal    Primary hypertension 7/31/2023    Seasonal allergies        Past Surgical History:   Procedure Laterality Date    CARPAL TUNNEL RELEASE Right 2016    CHOLECYSTECTOMY  2003    COLONOSCOPY  2016    NORMAL    CYST REMOVAL  1979    throat    CYST REMOVAL Right 2013    ganglion    DIAGNOSTIC LAPAROSCOPY      ENDOMETRIAL ABLATION      GALLBLADDER SURGERY  2003    KNEE ARTHROSCOPY Right     LAPAROSCOPIC TUBAL LIGATION      FULGERATION    OTHER SURGICAL HISTORY  1992    Chromoendoscopy of upper or lower gastrointestinal tract    TONSILLECTOMY  1969    TOOTH EXTRACTION  1978    TRIGGER FINGER RELEASE Right 2016    4TH FINGER    TRIGGER FINGER RELEASE Right 04/28/2022    Procedure: RIGHT TRIGGER FINGER  RELEASE (long/middle finger);  Surgeon: Vladimir López MD;  [place] [facility] [place] [facility] [place]  Service: Orthopedics;  Laterality: Right;    TUBAL ABDOMINAL LIGATION      UMBILICAL HERNIA REPAIR  2008    VULVA SURGERY  1997         Current Outpatient Medications:     azelastine (OPTIVAR) 0.05 % ophthalmic solution, Administer 1 drop to both eyes 2 (Two) Times a Day., Disp: 18 mL, Rfl: 1    diclofenac (VOLTAREN) 75 MG EC tablet, TAKE 1 TABLET BY MOUTH TWICE DAILY, Disp: 60 tablet, Rfl: 1    fluticasone (FLONASE) 50 MCG/ACT nasal spray, 2 sprays into the nostril(s) as directed by provider Daily., Disp: 48 g, Rfl: 1    levothyroxine (SYNTHROID, LEVOTHROID) 25 MCG tablet, Take 1 tablet by mouth Every Morning., Disp: 90 tablet, Rfl: 1    loratadine (CLARITIN) 10 MG tablet, Take 1 tablet by mouth Daily., Disp: 90 tablet, Rfl: 1    losartan (Cozaar) 25 MG tablet, Take 1 tablet by mouth Daily., Disp: 90 tablet, Rfl: 1    minocycline (MINOCIN,DYNACIN) 100 MG capsule, Take 1 capsule by mouth As Needed., Disp: , Rfl:     omeprazole (priLOSEC) 20 MG capsule, Take 1 capsule  "by mouth Daily., Disp: 90 capsule, Rfl: 1    Allergies   Allergen Reactions    Piroxicam Rash        Family History   Problem Relation Age of Onset    Diabetes Mother     Ovarian cancer Mother     Osteoporosis Mother     Arthritis Mother     Cancer Mother     Diabetes Brother     Arthritis Brother     Diabetes Sister     Diabetes Maternal Grandmother     Diabetes Maternal Grandfather     Stomach cancer Other         uncle    Diabetes Other         Aunt, uncle    Arthritis Brother     Diabetes Brother     Diabetes Sister     Malig Hyperthermia Neg Hx     Breast cancer Neg Hx     Uterine cancer Neg Hx     Colon cancer Neg Hx     Melanoma Neg Hx     Prostate cancer Neg Hx        Social History     Socioeconomic History    Marital status:    Tobacco Use    Smoking status: Never     Passive exposure: Never    Smokeless tobacco: Never   Vaping Use    Vaping status: Never Used   Substance and Sexual Activity    Alcohol use: Yes     Comment: have not had alcohol since the beginning of COVID    Drug use: Never    Sexual activity: Yes     Partners: Male     Birth control/protection: Surgical, Tubal ligation, Vasectomy       Vital Signs:   /91 (BP Location: Left arm, Patient Position: Sitting, Cuff Size: Adult)   Pulse 67   Ht 160 cm (63\")   Wt 79.4 kg (175 lb)   BMI 31.00 kg/m²      Physical Exam  Vitals reviewed.   Constitutional:       Appearance: Normal appearance.   Neurological:      General: No focal deficit present.      Mental Status: She is alert and oriented to person, place, and time.   Psychiatric:         Mood and Affect: Mood normal.         Behavior: Behavior normal.          Result Review :   The following data was reviewed by: MARILYN Pinto on 07/16/2024:      Bladder Scan interpretation 07/16/2024    Estimation of residual urine via BVI 3000 Verathon Bladder Scan  MA/nurse performing: Nikky MCNEIL MA  Residual Urine: 0 ml  Indication: Urge incontinence    Frequent urination "   Position: Supine  Examination: Incremental scanning of the suprapubic area using 2.0 MHz transducer using copious amounts of acoustic gel.   Findings: An anechoic area was demonstrated which represented the bladder, with measurement of residual urine as noted. I inspected this myself. In that the residual urine was stable or insignificant, refer to plan for treatment and plan necessary at this time.                Procedures        Assessment and Plan    Diagnoses and all orders for this visit:    1. Urge incontinence (Primary)  -     Bladder Scan    2. Frequent urination    Given that she is no longer having symptoms and not taking any medications, we will continue with that course.  We did discuss that if her symptoms return that I would send in a prescription for Gemtesa.    At this point the plan is to see her in 1 year.  If her symptoms have still resolved at that point she can cancel that follow-up.    Follow Up   Return in about 1 year (around 7/16/2025).  Patient was given instructions and counseling regarding her condition or for health maintenance advice. Please see specific information pulled into the AVS if appropriate.         This document has been electronically signed by MARILYN Pinto  July 16, 2024 11:18 EDT

## 2024-07-16 ENCOUNTER — OFFICE VISIT (OUTPATIENT)
Dept: UROLOGY | Facility: CLINIC | Age: 59
End: 2024-07-16
Payer: OTHER GOVERNMENT

## 2024-07-16 VITALS
BODY MASS INDEX: 31.01 KG/M2 | DIASTOLIC BLOOD PRESSURE: 91 MMHG | WEIGHT: 175 LBS | HEART RATE: 67 BPM | HEIGHT: 63 IN | SYSTOLIC BLOOD PRESSURE: 149 MMHG

## 2024-07-16 DIAGNOSIS — N39.41 URGE INCONTINENCE: Primary | ICD-10-CM

## 2024-07-16 DIAGNOSIS — R35.0 FREQUENT URINATION: ICD-10-CM

## 2024-07-16 LAB — URINE VOLUME: 0

## 2024-08-09 DIAGNOSIS — E03.9 HYPOTHYROIDISM, UNSPECIFIED TYPE: ICD-10-CM

## 2024-08-09 RX ORDER — LEVOTHYROXINE SODIUM 0.03 MG/1
25 TABLET ORAL EVERY MORNING
Qty: 90 TABLET | Refills: 3 | Status: SHIPPED | OUTPATIENT
Start: 2024-08-09

## 2024-08-14 ENCOUNTER — OFFICE VISIT (OUTPATIENT)
Dept: ORTHOPEDIC SURGERY | Facility: CLINIC | Age: 59
End: 2024-08-14
Payer: OTHER GOVERNMENT

## 2024-08-14 VITALS
OXYGEN SATURATION: 97 % | WEIGHT: 175 LBS | DIASTOLIC BLOOD PRESSURE: 84 MMHG | SYSTOLIC BLOOD PRESSURE: 139 MMHG | HEART RATE: 70 BPM | BODY MASS INDEX: 31.01 KG/M2 | HEIGHT: 63 IN

## 2024-08-14 DIAGNOSIS — M77.11 LATERAL EPICONDYLITIS OF RIGHT ELBOW: ICD-10-CM

## 2024-08-14 DIAGNOSIS — M25.511 RIGHT SHOULDER PAIN, UNSPECIFIED CHRONICITY: Primary | ICD-10-CM

## 2024-08-14 DIAGNOSIS — M75.41 IMPINGEMENT SYNDROME OF RIGHT SHOULDER: ICD-10-CM

## 2024-08-14 RX ADMIN — LIDOCAINE HYDROCHLORIDE 1 ML: 10 INJECTION, SOLUTION INFILTRATION; PERINEURAL at 08:32

## 2024-08-14 RX ADMIN — TRIAMCINOLONE ACETONIDE 40 MG: 40 INJECTION, SUSPENSION INTRA-ARTICULAR; INTRAMUSCULAR at 08:32

## 2024-08-14 NOTE — PROGRESS NOTES
"Chief Complaint  Follow-up of the Right Shoulder     Subjective      Dorina Basilio presents to [facility] for follow up of the right shoulder.  She has decrease pain in the right shoulder but notes she has pain on the lateral aspect of the elbow.  She does a lot of repetitive gripping.  She has had injections in the shoulder on 2/5/24.  Her elbow is more flared up today.     Allergies   Allergen Reactions    Piroxicam Rash        Social History     Socioeconomic History    Marital status:    Tobacco Use    Smoking status: Never     Passive exposure: Never    Smokeless tobacco: Never   Vaping Use    Vaping status: Never Used   Substance and Sexual Activity    Alcohol use: Yes     Comment: have not had alcohol since the beginning of COVID    Drug use: Never    Sexual activity: Yes     Partners: Male     Birth control/protection: Surgical, Tubal ligation, Vasectomy        I reviewed the patient's chief complaint, history of present illness, review of systems, past medical history, surgical history, family history, social history, medications, and allergy list.     Review of Systems     Constitutional: Denies fevers, chills, weight loss  Cardiovascular: Denies chest pain, shortness of breath  Skin: Denies rashes, acute skin changes  Neurologic: Denies headache, loss of consciousness        Vital Signs:   /84   Pulse 70   Ht 160 cm (62.99\")   Wt 79.4 kg (175 lb)   SpO2 97%   BMI 31.01 kg/m²          Physical Exam  General: Alert. No acute distress    Ortho Exam        Right upper extremity Right shoulder: Active range of motion for shoulder flexion 180, abduction 180, internal rotation T12, external rotation 90 degrees. Full range of motion of the elbow and the wrist. Neurovascular intact. Sensation is intact. Negative impingement sign.  Tender lateral epicondyle. Non-tender medial epicondyle. Non-tender radial head. Sensation to light touch median, radial, ulnar nerve. " Positive AIN, PIN, ulnar nerve motor function. Axillary sensation intact. Elbow ROM 0-150. Negative Tinel's at the elbow. no pain with resisted wrist and long finger extension.        Medium Joint: R elbow  Date/Time: 8/14/2024 8:32 AM  Consent given by: patient  Site marked: site marked  Timeout: Immediately prior to procedure a time out was called to verify the correct patient, procedure, equipment, support staff and site/side marked as required   Supporting Documentation  Indications: pain   Procedure Details  Location: elbow - R elbow  Preparation: Patient was prepped and draped in the usual sterile fashion  Needle size: 23 G  Medications administered: 1 mL lidocaine 1 %; 40 mg triamcinolone acetonide 40 MG/ML  Patient tolerance: patient tolerated the procedure well with no immediate complications    This injection documentation was Scribed for Vladimir López MD by Tanisha Donis MA.  08/14/24   08:33 EDT        Imaging Results (Most Recent)       None             Result Review :          Assessment and Plan     Diagnoses and all orders for this visit:    1. Right shoulder pain, unspecified chronicity (Primary)    2. Impingement syndrome of right shoulder    3. Lateral epicondylitis of right elbow        Discussed the treatment plan with the patient.     Discussed the risks and benefits of conservative measures. The patient expressed understanding and wished to proceed with a right elbow steroid injection.  She tolerated the injection well.     HEP exercises.  Discussed brace of the elbow tendonitis.  Prescribed physical therapy.       Call or return if worsening symptoms.    Follow Up     PRN      Patient was given instructions and counseling regarding her condition or for health maintenance advice. Please see specific information pulled into the AVS if appropriate.     Scribed for Vladimir López MD by Coral Enciso MA.  08/14/24   08:15 EDT    I have personally performed the services described in this  document as scribed by the above individual and it is both accurate and complete. Vladimir López MD 08/15/24

## 2024-08-15 RX ORDER — LIDOCAINE HYDROCHLORIDE 10 MG/ML
1 INJECTION, SOLUTION INFILTRATION; PERINEURAL
Status: COMPLETED | OUTPATIENT
Start: 2024-08-14 | End: 2024-08-14

## 2024-08-15 RX ORDER — TRIAMCINOLONE ACETONIDE 40 MG/ML
40 INJECTION, SUSPENSION INTRA-ARTICULAR; INTRAMUSCULAR
Status: COMPLETED | OUTPATIENT
Start: 2024-08-14 | End: 2024-08-14

## 2024-09-13 NOTE — PROGRESS NOTES
"GYN Problem/Follow Up Visit    Chief Complaint   Patient presents with    Follow-up     Discuss U/S Results           HPI  Dorina Basilio is a 58 y.o. female, , who presents for u/s f/u. Recently seen for right sided pelvic pain. No new concerns.        Additional OB/GYN History   No LMP recorded. Patient has had an ablation.  Allergies : Piroxicam     The additional following portions of the patient's history were reviewed and updated as appropriate: allergies, current medications, past family history, past medical history, past social history, past surgical history, and problem list.    Review of Systems    I have reviewed and agree with the HPI, ROS, and historical information as entered above. MARILYN Day    Objective   /82   Pulse 64   Ht 160 cm (63\")   Wt 85.8 kg (189 lb 3.2 oz)   BMI 33.52 kg/m²     Physical Exam  Vitals reviewed.   Neurological:      Mental Status: She is alert and oriented to person, place, and time.            Assessment and Plan    Diagnoses and all orders for this visit:    1. Pelvic pain in female (Primary)    Reviewed pelvic u/s from 3/28/24: endometrium 2 mm, small uterine fibroid, ovaries normal. Discussed normal u/s. Recommend eval by pcp to discuss other causes of pelvic pain. She will f/u here prn.     Counseling:  She understands the importance of having the above orders performed in a timely fashion.  She is encouraged to review her results online and/or contact or office if she has questions.     Follow Up:  Return for Next scheduled follow up.      MARILYN Day  2024  " Patient is acutely agitated, in four point restraints, straining against restraints, spitting at security officers  Speech is not assessed, patient is nonverbal  Thought Process is not assessed due to patient being nonverbal  Thought Content  is not assessed due to patient being nonverbal  Perception-  is not assessed due to patient being nonverbal  Mood appears agitated  Affect agitated  Patient SI  is not assessed due to patient being nonverbal  Patient being aggressive and restrained during exam  Orientation  is not assessed due to patient being nonverbal  Cognition-  is not assessed due to patient being nonverbal  Fund of knowledge  is not assessed due to patient being nonverbal  Insight is not assessed due to patient being nonverbal  Judgement  is not assessed due to patient being nonverbal  Impulse Control not intact  Gait not assessed, patient in restraints

## 2024-09-16 DIAGNOSIS — I10 PRIMARY HYPERTENSION: ICD-10-CM

## 2024-09-17 RX ORDER — LOSARTAN POTASSIUM 50 MG/1
50 TABLET ORAL DAILY
Qty: 90 TABLET | Refills: 1 | Status: SHIPPED | OUTPATIENT
Start: 2024-09-17

## 2024-10-16 NOTE — PROGRESS NOTES
Chief Complaint  Hypertension (59 year old female here today for a 6 month follow up and lab review. She has brought in a blood pressure log for review. ) and Hypothyroidism (6 month follow up and lab review)    History of Present Illness  SUBJECTIVE  Dorina Basilio presents to [facility] follow up on chronic disease management.     She is doing well overall.    She denies any chest pain, soa, palpitations, nausea, vomiting,     Past Medical History:   Diagnosis Date    Abnormal Pap smear of cervix     Acid reflux     Allergic     seasonal    Bladder disorder     [facility]/URGENCY    Cholelithiasis     removed    Hypothyroidism     Kidney stone     Migraines     Mixed hyperlipidemia 10/21/2024    Pap smear for cervical cancer screening 01/2020    normal    Primary hypertension 7/31/2023    Seasonal allergies       Family History   Problem Relation Age of Onset    Diabetes Mother     Ovarian cancer Mother     Osteoporosis Mother     Arthritis Mother     Cancer Mother     Diabetes Brother     Arthritis Brother     Diabetes Sister     Diabetes Maternal Grandmother     Diabetes Maternal Grandfather     Stomach cancer Other         uncle    Diabetes Other         Aunt, uncle    Arthritis Brother     Diabetes Brother     Diabetes Sister     Malig Hyperthermia Neg Hx     Breast cancer Neg Hx     Uterine cancer Neg Hx     Colon cancer Neg Hx     Melanoma Neg Hx     Prostate cancer Neg Hx       Past Surgical History:   Procedure Laterality Date    CARPAL TUNNEL RELEASE Right 2016    CHOLECYSTECTOMY  2003    COLONOSCOPY  2016    NORMAL    CYST REMOVAL  1979    throat    CYST REMOVAL Right 2013    ganglion    DIAGNOSTIC LAPAROSCOPY      ENDOMETRIAL ABLATION      GALLBLADDER SURGERY  2003    KNEE ARTHROSCOPY Right     LAPAROSCOPIC TUBAL LIGATION      FULGERATION    OTHER SURGICAL HISTORY  1992    Chromoendoscopy of upper or lower gastrointestinal tract    TONSILLECTOMY  1969    TOOTH EXTRACTION   "1978    TRIGGER FINGER RELEASE Right 2016    4TH FINGER    TRIGGER FINGER RELEASE Right 04/28/2022    Procedure: RIGHT TRIGGER FINGER  RELEASE (long/middle finger);  Surgeon: Vladimir López MD;  Location: [facility];  Service: Orthopedics;  Laterality: Right;    TUBAL ABDOMINAL LIGATION      UMBILICAL HERNIA REPAIR  2008    VULVA SURGERY  1997        Current Outpatient Medications:     azelastine (OPTIVAR) 0.05 % ophthalmic solution, Administer 1 drop to both eyes 2 (Two) Times a Day., Disp: 18 mL, Rfl: 1    diclofenac (VOLTAREN) 75 MG EC tablet, TAKE 1 TABLET BY MOUTH TWICE DAILY, Disp: 60 tablet, Rfl: 1    fluticasone (FLONASE) 50 MCG/ACT nasal spray, 2 sprays into the nostril(s) as directed by provider Daily., Disp: 48 g, Rfl: 1    levocetirizine (XYZAL) 5 MG tablet, Take 1 tablet by mouth Every Evening., Disp: 90 tablet, Rfl: 1    levothyroxine (SYNTHROID, LEVOTHROID) 50 MCG tablet, Take 1 tablet by mouth Every Morning., Disp: 90 tablet, Rfl: 1    losartan (Cozaar) 50 MG tablet, Take 1 tablet by mouth Daily., Disp: 90 tablet, Rfl: 1    omeprazole (priLOSEC) 20 MG capsule, Take 1 capsule by mouth Daily., Disp: 90 capsule, Rfl: 1    hydroCHLOROthiazide 12.5 MG tablet, Take 1 tablet by mouth Daily., Disp: 90 tablet, Rfl: 1    OBJECTIVE  Vital Signs:   /92 (BP Location: Left arm, Patient Position: Sitting)   Pulse 58   Temp 98.7 °F (37.1 °C) (Temporal)   Resp 18   Ht 160 cm (62.99\")   Wt 82.6 kg (182 lb)   SpO2 98%   BMI 32.25 kg/m²    Estimated body mass index is 32.25 kg/m² as calculated from the following:    Height as of this encounter: 160 cm (62.99\").    Weight as of this encounter: 82.6 kg (182 lb).     Wt Readings from Last 3 Encounters:   10/21/24 82.6 kg (182 lb)   08/14/24 79.4 kg (175 lb)   07/16/24 79.4 kg (175 lb)     BP Readings from Last 3 Encounters:   10/21/24 140/92   08/14/24 139/84   07/16/24 149/91       Physical Exam  Vitals and nursing note reviewed.   Constitutional:       " Appearance: Normal appearance.   HENT:      Head: Normocephalic.   Eyes:      Extraocular Movements: Extraocular movements intact.      Conjunctiva/sclera: Conjunctivae normal.   Cardiovascular:      Rate and Rhythm: Normal rate and regular rhythm.      Heart sounds: Normal heart sounds. No murmur heard.  Pulmonary:      Effort: Pulmonary effort is normal.      Breath sounds: Normal breath sounds. No wheezing or rales.   Abdominal:      General: Bowel sounds are normal.      Palpations: Abdomen is soft.      Tenderness: There is no abdominal tenderness. There is no guarding.   Musculoskeletal:         General: No swelling. Normal range of motion.      Cervical back: Normal range of motion and neck supple.   Skin:     General: Skin is warm and dry.   Neurological:      General: No focal deficit present.      Mental Status: She is alert and oriented to person, place, and time. Mental status is at baseline.   Psychiatric:         Mood and Affect: Mood normal.         Behavior: Behavior normal.         Thought Content: Thought content normal.         Judgment: Judgment normal.          Result Review    CMP          4/15/2024    08:29 10/17/2024    08:51   CMP   Glucose 111  103    BUN 15  16    Creatinine 0.92  0.92    EGFR 71.4  71.0    Sodium 142  142    Potassium 4.6  4.6    Chloride 108  105    Calcium 9.4  10.2    Total Protein 6.6  7.5    Albumin 4.3  4.5    Globulin 2.3  3.0    Total Bilirubin <0.2  0.3    Alkaline Phosphatase 95  89    AST (SGOT) 12  19    ALT (SGPT) 33  18    Albumin/Globulin Ratio 1.9  1.5    BUN/Creatinine Ratio 16.3  17.4    Anion Gap 7.9  6.4      CBC w/diff          4/15/2024    08:29 10/17/2024    08:51   CBC w/Diff   WBC 4.97  5.83    RBC 4.57  4.79    Hemoglobin 13.2  13.6    Hematocrit 39.9  42.9    MCV 87.3  89.6    MCH 28.9  28.4    MCHC 33.1  31.7    RDW 12.8  13.3    Platelets 305  363    Neutrophil Rel % 46.3  59.9    Immature Granulocyte Rel % 0.2  0.2    Lymphocyte Rel % 32.6   27.6    Monocyte Rel % 7.4  7.0    Eosinophil Rel % 12.9  4.6    Basophil Rel % 0.6  0.7      Lipid Panel          4/15/2024    08:29 10/17/2024    08:51   Lipid Panel   Total Cholesterol 194  229    Triglycerides 102  114    HDL Cholesterol 53  71    VLDL Cholesterol 18  20    LDL Cholesterol  123  138    LDL/HDL Ratio 2.28  1.90      TSH          4/15/2024    08:29 10/17/2024    08:51   TSH   TSH 3.830  4.300      Most Recent A1C          10/17/2024    08:51   HGBA1C Most Recent   Hemoglobin A1C 5.80        A1C Last 3 Results          4/15/2024    08:29 10/17/2024    08:51   HGBA1C Last 3 Results   Hemoglobin A1C 6.30  5.80      Microalbumin          10/17/2024    08:56   Microalbumin   Microalbumin, Urine <1.2        No Images in the past 120 days found..     The above data has been reviewed by MARILYN Anotine 10/21/2024 16:24 EDT.          Patient Care Team:  Michelle Martinez APRN as PCP - General (Internal Medicine)  Emmy Hill APRN as Nurse Practitioner (Urology)  Kedar Condon MD as Consulting Physician (Urology)            ASSESSMENT & PLAN    Diagnoses and all orders for this visit:    1. Primary hypertension (Primary)  Assessment & Plan:  Blood pressure has been elevated at home. Her blood pressure is running around 140's/80-90's.  She is currently on losartan 50 mg daily.   Will add in hydrochlorothiazide 12.5 mg daily.   Recommend patient keep BP log and upload to [employer] in a couple of weeks.     Orders:  -     hydroCHLOROthiazide 12.5 MG tablet; Take 1 tablet by mouth Daily.  Dispense: 90 tablet; Refill: 1  -     Basic metabolic panel; Future  -     Basic metabolic panel; Future    2. Hypothyroidism, unspecified type  Assessment & Plan:  Tsh is elevated  She states that she does does take synthroid every morning on an empty stomach  Will bump up synthroid to 50 mcg qam    Orders:  -     levothyroxine (SYNTHROID, LEVOTHROID) 50 MCG tablet; Take 1 tablet by mouth Every Morning.   Dispense: 90 tablet; Refill: 1  -     TSH+Free T4; Future    3. Prediabetes  Assessment & Plan:  A1c is down to 5.8%.  Recommend continuing to cut back on sugar and carb intake.       4. Gastroesophageal reflux disease without esophagitis  Assessment & Plan:  Stable with low dose prilosec      5. Vitamin D deficiency  Assessment & Plan:  Vit d is on low end normal  Recommend adding in vit d 5000 units daily.       6. Needs flu shot  -     Fluzone >6mos ([id number])    7. Mixed hyperlipidemia  Assessment & Plan:   The 10-year ASCVD risk score (Hilario MCKEON, et al., 2019) is: 5.5%    Values used to calculate the score:      Age: 59 years      Sex: Female      Is Non- : No      Diabetic: No      Tobacco smoker: No      Systolic Blood Pressure: 140 mmHg      Is BP treated: Yes      HDL Cholesterol: 71 mg/dL      Total Cholesterol: 229 mg/dL     LDL is a little elevated, HDL is excellent. We discussed healthy diet and regular exercise.      8. Allergy, sequela  -     levocetirizine (XYZAL) 5 MG tablet; Take 1 tablet by mouth Every Evening.  Dispense: 90 tablet; Refill: 1         Tobacco Use: Low Risk  (10/21/2024)    Patient History     Smoking Tobacco Use: Never     Smokeless Tobacco Use: Never     Passive Exposure: Never       Follow Up     Return in about 3 months (around 1/21/2025).    Please note that portions of this note were completed with a voice recognition program.    Patient was given instructions and counseling regarding her condition or for health maintenance advice. Please see specific information pulled into the AVS if appropriate.   I have reviewed information obtained and documented by others and I have confirmed the accuracy of this documented note.    MARILYN Antoine

## 2024-10-17 ENCOUNTER — LAB (OUTPATIENT)
Dept: LAB | Facility: HOSPITAL | Age: 59
End: 2024-10-17
Payer: OTHER GOVERNMENT

## 2024-10-17 DIAGNOSIS — R73.03 PREDIABETES: ICD-10-CM

## 2024-10-17 DIAGNOSIS — E55.9 VITAMIN D DEFICIENCY: ICD-10-CM

## 2024-10-17 DIAGNOSIS — I10 PRIMARY HYPERTENSION: ICD-10-CM

## 2024-10-17 DIAGNOSIS — R79.89 ELEVATED VITAMIN B12 LEVEL: ICD-10-CM

## 2024-10-17 DIAGNOSIS — E03.9 HYPOTHYROIDISM, UNSPECIFIED TYPE: ICD-10-CM

## 2024-10-17 LAB
25(OH)D3 SERPL-MCNC: 31.6 NG/ML (ref 30–100)
ALBUMIN SERPL-MCNC: 4.5 G/DL (ref 3.5–5.2)
ALBUMIN UR-MCNC: <1.2 MG/DL
ALBUMIN/GLOB SERPL: 1.5 G/DL
ALP SERPL-CCNC: 89 U/L (ref 39–117)
ALT SERPL W P-5'-P-CCNC: 18 U/L (ref 1–33)
ANION GAP SERPL CALCULATED.3IONS-SCNC: 6.4 MMOL/L (ref 5–15)
AST SERPL-CCNC: 19 U/L (ref 1–32)
BASOPHILS # BLD AUTO: 0.04 10*3/MM3 (ref 0–0.2)
BASOPHILS NFR BLD AUTO: 0.7 % (ref 0–1.5)
BILIRUB SERPL-MCNC: 0.3 MG/DL (ref 0–1.2)
BUN SERPL-MCNC: 16 MG/DL (ref 8–23)
BUN/CREAT SERPL: 17.4 (ref 7–25)
CALCIUM SPEC-SCNC: 10.2 MG/DL (ref 8.6–10.5)
CHLORIDE SERPL-SCNC: 105 MMOL/L (ref 98–107)
CHOLEST SERPL-MCNC: 229 MG/DL (ref 0–200)
CO2 SERPL-SCNC: 30.6 MMOL/L (ref 22–29)
CREAT SERPL-MCNC: 0.92 MG/DL (ref 0.57–1)
CREAT UR-MCNC: 97.4 MG/DL
DEPRECATED RDW RBC AUTO: 44.2 FL (ref 37–54)
EGFRCR SERPLBLD CKD-EPI 2021: 71 ML/MIN/1.73
EOSINOPHIL # BLD AUTO: 0.27 10*3/MM3 (ref 0–0.4)
EOSINOPHIL NFR BLD AUTO: 4.6 % (ref 0.3–6.2)
ERYTHROCYTE [DISTWIDTH] IN BLOOD BY AUTOMATED COUNT: 13.3 % (ref 12.3–15.4)
FOLATE SERPL-MCNC: 9.51 NG/ML (ref 4.78–24.2)
GLOBULIN UR ELPH-MCNC: 3 GM/DL
GLUCOSE SERPL-MCNC: 103 MG/DL (ref 65–99)
HBA1C MFR BLD: 5.8 % (ref 4.8–5.6)
HCT VFR BLD AUTO: 42.9 % (ref 34–46.6)
HDLC SERPL-MCNC: 71 MG/DL (ref 40–60)
HGB BLD-MCNC: 13.6 G/DL (ref 12–15.9)
IMM GRANULOCYTES # BLD AUTO: 0.01 10*3/MM3 (ref 0–0.05)
IMM GRANULOCYTES NFR BLD AUTO: 0.2 % (ref 0–0.5)
LDLC SERPL CALC-MCNC: 138 MG/DL (ref 0–100)
LDLC/HDLC SERPL: 1.9 {RATIO}
LYMPHOCYTES # BLD AUTO: 1.61 10*3/MM3 (ref 0.7–3.1)
LYMPHOCYTES NFR BLD AUTO: 27.6 % (ref 19.6–45.3)
MCH RBC QN AUTO: 28.4 PG (ref 26.6–33)
MCHC RBC AUTO-ENTMCNC: 31.7 G/DL (ref 31.5–35.7)
MCV RBC AUTO: 89.6 FL (ref 79–97)
MICROALBUMIN/CREAT UR: NORMAL MG/G{CREAT}
MONOCYTES # BLD AUTO: 0.41 10*3/MM3 (ref 0.1–0.9)
MONOCYTES NFR BLD AUTO: 7 % (ref 5–12)
NEUTROPHILS NFR BLD AUTO: 3.49 10*3/MM3 (ref 1.7–7)
NEUTROPHILS NFR BLD AUTO: 59.9 % (ref 42.7–76)
NRBC BLD AUTO-RTO: 0 /100 WBC (ref 0–0.2)
PLATELET # BLD AUTO: 363 10*3/MM3 (ref 140–450)
PMV BLD AUTO: 10.2 FL (ref 6–12)
POTASSIUM SERPL-SCNC: 4.6 MMOL/L (ref 3.5–5.2)
PROT SERPL-MCNC: 7.5 G/DL (ref 6–8.5)
RBC # BLD AUTO: 4.79 10*6/MM3 (ref 3.77–5.28)
SODIUM SERPL-SCNC: 142 MMOL/L (ref 136–145)
T4 FREE SERPL-MCNC: 1.22 NG/DL (ref 0.92–1.68)
TRIGL SERPL-MCNC: 114 MG/DL (ref 0–150)
TSH SERPL DL<=0.05 MIU/L-ACNC: 4.3 UIU/ML (ref 0.27–4.2)
VIT B12 BLD-MCNC: 1005 PG/ML (ref 211–946)
VLDLC SERPL-MCNC: 20 MG/DL (ref 5–40)
WBC NRBC COR # BLD AUTO: 5.83 10*3/MM3 (ref 3.4–10.8)

## 2024-10-17 PROCEDURE — 82570 ASSAY OF URINE CREATININE: CPT

## 2024-10-17 PROCEDURE — 85025 COMPLETE CBC W/AUTO DIFF WBC: CPT

## 2024-10-17 PROCEDURE — 80053 COMPREHEN METABOLIC PANEL: CPT

## 2024-10-17 PROCEDURE — 82746 ASSAY OF FOLIC ACID SERUM: CPT

## 2024-10-17 PROCEDURE — 82043 UR ALBUMIN QUANTITATIVE: CPT

## 2024-10-17 PROCEDURE — 82306 VITAMIN D 25 HYDROXY: CPT

## 2024-10-17 PROCEDURE — 84443 ASSAY THYROID STIM HORMONE: CPT

## 2024-10-17 PROCEDURE — 36415 COLL VENOUS BLD VENIPUNCTURE: CPT

## 2024-10-17 PROCEDURE — 83036 HEMOGLOBIN GLYCOSYLATED A1C: CPT

## 2024-10-17 PROCEDURE — 82607 VITAMIN B-12: CPT

## 2024-10-17 PROCEDURE — 84439 ASSAY OF FREE THYROXINE: CPT

## 2024-10-17 PROCEDURE — 80061 LIPID PANEL: CPT

## 2024-10-21 ENCOUNTER — OFFICE VISIT (OUTPATIENT)
Dept: INTERNAL MEDICINE | Age: 59
End: 2024-10-21
Payer: OTHER GOVERNMENT

## 2024-10-21 VITALS
RESPIRATION RATE: 18 BRPM | BODY MASS INDEX: 32.25 KG/M2 | HEART RATE: 58 BPM | HEIGHT: 63 IN | SYSTOLIC BLOOD PRESSURE: 140 MMHG | DIASTOLIC BLOOD PRESSURE: 92 MMHG | OXYGEN SATURATION: 98 % | WEIGHT: 182 LBS | TEMPERATURE: 98.7 F

## 2024-10-21 DIAGNOSIS — K21.9 GASTROESOPHAGEAL REFLUX DISEASE WITHOUT ESOPHAGITIS: ICD-10-CM

## 2024-10-21 DIAGNOSIS — Z23 NEEDS FLU SHOT: ICD-10-CM

## 2024-10-21 DIAGNOSIS — R73.03 PREDIABETES: ICD-10-CM

## 2024-10-21 DIAGNOSIS — I10 PRIMARY HYPERTENSION: Primary | ICD-10-CM

## 2024-10-21 DIAGNOSIS — T78.40XS ALLERGY, SEQUELA: ICD-10-CM

## 2024-10-21 DIAGNOSIS — E55.9 VITAMIN D DEFICIENCY: ICD-10-CM

## 2024-10-21 DIAGNOSIS — E03.9 HYPOTHYROIDISM, UNSPECIFIED TYPE: ICD-10-CM

## 2024-10-21 DIAGNOSIS — E78.2 MIXED HYPERLIPIDEMIA: ICD-10-CM

## 2024-10-21 PROCEDURE — 90656 IIV3 VACC NO PRSV 0.5 ML IM: CPT

## 2024-10-21 PROCEDURE — 90471 IMMUNIZATION ADMIN: CPT

## 2024-10-21 PROCEDURE — 99214 OFFICE O/P EST MOD 30 MIN: CPT

## 2024-10-21 RX ORDER — HYDROCHLOROTHIAZIDE 12.5 MG/1
12.5 TABLET ORAL DAILY
Qty: 90 TABLET | Refills: 1 | Status: SHIPPED | OUTPATIENT
Start: 2024-10-21

## 2024-10-21 RX ORDER — LEVOCETIRIZINE DIHYDROCHLORIDE 5 MG/1
5 TABLET, FILM COATED ORAL EVERY EVENING
COMMUNITY
End: 2024-10-21 | Stop reason: SDUPTHER

## 2024-10-21 RX ORDER — LEVOTHYROXINE SODIUM 50 UG/1
50 TABLET ORAL EVERY MORNING
Qty: 90 TABLET | Refills: 1 | Status: SHIPPED | OUTPATIENT
Start: 2024-10-21

## 2024-10-21 RX ORDER — LEVOCETIRIZINE DIHYDROCHLORIDE 5 MG/1
5 TABLET, FILM COATED ORAL EVERY EVENING
Qty: 90 TABLET | Refills: 1 | Status: SHIPPED | OUTPATIENT
Start: 2024-10-21

## 2024-10-21 NOTE — ASSESSMENT & PLAN NOTE
The 10-year ASCVD risk score (Hilario MCKEON, et al., 2019) is: 5.5%    Values used to calculate the score:      Age: 59 years      Sex: Female      Is Non- : No      Diabetic: No      Tobacco smoker: No      Systolic Blood Pressure: 140 mmHg      Is BP treated: Yes      HDL Cholesterol: 71 mg/dL      Total Cholesterol: 229 mg/dL     LDL is a little elevated, HDL is excellent. We discussed healthy diet and regular exercise.

## 2024-10-21 NOTE — ASSESSMENT & PLAN NOTE
Tsh is elevated  She states that she does does take synthroid every morning on an empty stomach  Will bump up synthroid to 50 mcg qam

## 2024-10-21 NOTE — ASSESSMENT & PLAN NOTE
Blood pressure has been elevated at home. Her blood pressure is running around 140's/80-90's.  She is currently on losartan 50 mg daily.   Will add in hydrochlorothiazide 12.5 mg daily.   Recommend patient keep BP log and upload to [employer] in a couple of weeks.

## 2024-11-18 DIAGNOSIS — K21.9 GASTROESOPHAGEAL REFLUX DISEASE, UNSPECIFIED WHETHER ESOPHAGITIS PRESENT: ICD-10-CM

## 2024-12-08 DIAGNOSIS — M25.511 RIGHT SHOULDER PAIN, UNSPECIFIED CHRONICITY: ICD-10-CM

## 2024-12-08 DIAGNOSIS — M75.81 ROTATOR CUFF TENDONITIS, RIGHT: ICD-10-CM

## 2024-12-08 DIAGNOSIS — M75.41 IMPINGEMENT SYNDROME OF RIGHT SHOULDER: ICD-10-CM

## 2024-12-09 RX ORDER — DICLOFENAC SODIUM 75 MG/1
75 TABLET, DELAYED RELEASE ORAL 2 TIMES DAILY
Qty: 60 TABLET | Refills: 1 | Status: SHIPPED | OUTPATIENT
Start: 2024-12-09

## 2025-01-17 ENCOUNTER — LAB (OUTPATIENT)
Dept: LAB | Facility: HOSPITAL | Age: 60
End: 2025-01-17
Payer: OTHER GOVERNMENT

## 2025-01-17 DIAGNOSIS — I10 PRIMARY HYPERTENSION: ICD-10-CM

## 2025-01-17 DIAGNOSIS — E03.9 HYPOTHYROIDISM, UNSPECIFIED TYPE: ICD-10-CM

## 2025-01-17 LAB
ANION GAP SERPL CALCULATED.3IONS-SCNC: 11.4 MMOL/L (ref 5–15)
BUN SERPL-MCNC: 16 MG/DL (ref 8–23)
BUN/CREAT SERPL: 15.7 (ref 7–25)
CALCIUM SPEC-SCNC: 9.6 MG/DL (ref 8.6–10.5)
CHLORIDE SERPL-SCNC: 103 MMOL/L (ref 98–107)
CO2 SERPL-SCNC: 27.6 MMOL/L (ref 22–29)
CREAT SERPL-MCNC: 1.02 MG/DL (ref 0.57–1)
EGFRCR SERPLBLD CKD-EPI 2021: 62.7 ML/MIN/1.73
GLUCOSE SERPL-MCNC: 121 MG/DL (ref 65–99)
POTASSIUM SERPL-SCNC: 4.4 MMOL/L (ref 3.5–5.2)
SODIUM SERPL-SCNC: 142 MMOL/L (ref 136–145)
T4 FREE SERPL-MCNC: 1.39 NG/DL (ref 0.92–1.68)
TSH SERPL DL<=0.05 MIU/L-ACNC: 2.65 UIU/ML (ref 0.27–4.2)

## 2025-01-17 PROCEDURE — 36415 COLL VENOUS BLD VENIPUNCTURE: CPT

## 2025-01-17 PROCEDURE — 84443 ASSAY THYROID STIM HORMONE: CPT

## 2025-01-17 PROCEDURE — 84439 ASSAY OF FREE THYROXINE: CPT

## 2025-01-17 PROCEDURE — 80048 BASIC METABOLIC PNL TOTAL CA: CPT

## 2025-01-21 ENCOUNTER — OFFICE VISIT (OUTPATIENT)
Dept: INTERNAL MEDICINE | Age: 60
End: 2025-01-21
Payer: OTHER GOVERNMENT

## 2025-01-21 VITALS
TEMPERATURE: 98.4 F | HEART RATE: 84 BPM | SYSTOLIC BLOOD PRESSURE: 124 MMHG | WEIGHT: 185.2 LBS | BODY MASS INDEX: 32.82 KG/M2 | DIASTOLIC BLOOD PRESSURE: 80 MMHG | HEIGHT: 63 IN

## 2025-01-21 DIAGNOSIS — R73.03 PREDIABETES: ICD-10-CM

## 2025-01-21 DIAGNOSIS — R10.9 FLANK PAIN: ICD-10-CM

## 2025-01-21 DIAGNOSIS — R10.13 EPIGASTRIC PAIN: ICD-10-CM

## 2025-01-21 DIAGNOSIS — R06.2 WHEEZING: ICD-10-CM

## 2025-01-21 DIAGNOSIS — I10 PRIMARY HYPERTENSION: Primary | ICD-10-CM

## 2025-01-21 DIAGNOSIS — K21.9 GASTROESOPHAGEAL REFLUX DISEASE, UNSPECIFIED WHETHER ESOPHAGITIS PRESENT: ICD-10-CM

## 2025-01-21 DIAGNOSIS — E78.2 MIXED HYPERLIPIDEMIA: ICD-10-CM

## 2025-01-21 DIAGNOSIS — E03.9 HYPOTHYROIDISM, UNSPECIFIED TYPE: ICD-10-CM

## 2025-01-21 PROCEDURE — 99214 OFFICE O/P EST MOD 30 MIN: CPT

## 2025-01-21 RX ORDER — ALBUTEROL SULFATE 90 UG/1
2 INHALANT RESPIRATORY (INHALATION) EVERY 4 HOURS PRN
Qty: 18 G | Refills: 1 | Status: SHIPPED | OUTPATIENT
Start: 2025-01-21

## 2025-01-21 RX ORDER — OMEPRAZOLE 40 MG/1
40 CAPSULE, DELAYED RELEASE ORAL DAILY
Qty: 90 CAPSULE | Refills: 1 | Status: SHIPPED | OUTPATIENT
Start: 2025-01-21

## 2025-01-21 NOTE — PROGRESS NOTES
Chief Complaint  Primary Care Follow-Up (58 yo female has come in today for a follow up on thyroid, hypertension and lab review. Pain under right rib cage during the holidays and again on sunday, pt states they had do not have  gallbladder but it felt like gallbladder pain. )    History of Present Illness  SUBJECTIVE  Dorina Basilio presents to [facility]     History of Present Illness  The patient presents for evaluation of blood pressure, abdominal pain, acid reflux, wheezing, and cough.    She has been monitoring her blood pressure at home, with readings ranging from 130 to 150 systolic and 70s to low 80s diastolic. A recent reading was 124/80. She has purchased a new blood pressure machine but has not yet started using it. She reports experiencing stress, which she believes may be contributing to her elevated blood pressure.    She describes experiencing pain similar to gallbladder discomfort, located under the right rib cage, lasting approximately 45 minutes. She has attempted to alleviate the pain by walking around the house, breathing, and applying a hot water bottle. She has a history of kidney stones on both sides, but currently experiences more pain on the left side. She has increased her water intake and resumed drinking milk, although she notes that it now causes bloating and gas. She is monitoring her urine for any changes in color or odor.    She also reports frequent acid reflux and has been taking omeprazole more frequently than usual. She has never undergone an endoscopy but has had a barium swallow test.    She experienced an episode of wheezing and coughing after laughing excessively, which was relieved by using an inhaler. She has not experienced shortness of breath but did have congestion and mucus production in December 2024. She has been using cough drops to manage her symptoms.    FAMILY HISTORY  Her aunt on her father's side had pancreatic cancer. Her  father and his family were alcoholics.    MEDICATIONS  Current: omeprazole      Past Medical History:   Diagnosis Date    Abnormal Pap smear of cervix     Acid reflux     Allergic     seasonal    Bladder disorder     THEODORA/URGENCY    Cholelithiasis     removed    Hypothyroidism     Kidney stone     Migraines     Mixed hyperlipidemia 10/21/2024    Pap smear for cervical cancer screening 01/2020    normal    Primary hypertension 7/31/2023    Seasonal allergies       Family History   Problem Relation Age of Onset    Diabetes Mother     Ovarian cancer Mother     Osteoporosis Mother     Arthritis Mother     Cancer Mother     Vision loss Mother     Diabetes Brother     Arthritis Brother     Diabetes Sister     Diabetes Maternal Grandmother     Diabetes Maternal Grandfather     Stomach cancer Other         uncle    Diabetes Other         Aunt, uncle    Arthritis Brother     Diabetes Brother     Diabetes Sister     Stroke Sister     Alcohol abuse Father     Liver disease Father     Malig Hyperthermia Neg Hx     Breast cancer Neg Hx     Uterine cancer Neg Hx     Colon cancer Neg Hx     Melanoma Neg Hx     Prostate cancer Neg Hx       Past Surgical History:   Procedure Laterality Date    CARPAL TUNNEL RELEASE Right 2016    CHOLECYSTECTOMY  2003    COLONOSCOPY  2016    NORMAL    CYST REMOVAL  1979    throat    CYST REMOVAL Right 2013    ganglion    DIAGNOSTIC LAPAROSCOPY      ENDOMETRIAL ABLATION      GALLBLADDER SURGERY  2003    KNEE ARTHROSCOPY Right     LAPAROSCOPIC TUBAL LIGATION      FULGERATION    OTHER SURGICAL HISTORY  1992    Chromoendoscopy of upper or lower gastrointestinal tract    TONSILLECTOMY  1969    TOOTH EXTRACTION  1978    TRIGGER FINGER RELEASE Right 2016    4TH FINGER    TRIGGER FINGER RELEASE Right 04/28/2022    Procedure: RIGHT TRIGGER FINGER  RELEASE (long/middle finger);  Surgeon: Vladimir López MD;  Location: [facility] OR [facility] [place];  Service: Orthopedics;  Laterality: Right;    TUBAL ABDOMINAL LIGATION       "UMBILICAL HERNIA REPAIR  2008    VULVA SURGERY  1997        Current Outpatient Medications:     azelastine (OPTIVAR) 0.05 % ophthalmic solution, Administer 1 drop to both eyes 2 (Two) Times a Day., Disp: 18 mL, Rfl: 1    diclofenac (VOLTAREN) 75 MG EC tablet, Take 1 tablet by mouth 2 (Two) Times a Day., Disp: 60 tablet, Rfl: 1    fluticasone (FLONASE) 50 MCG/ACT nasal spray, 2 sprays into the nostril(s) as directed by provider Daily., Disp: 48 g, Rfl: 1    hydroCHLOROthiazide 12.5 MG tablet, Take 1 tablet by mouth Daily., Disp: 90 tablet, Rfl: 1    levocetirizine (XYZAL) 5 MG tablet, Take 1 tablet by mouth Every Evening., Disp: 90 tablet, Rfl: 1    levothyroxine (SYNTHROID, LEVOTHROID) 50 MCG tablet, Take 1 tablet by mouth Every Morning., Disp: 90 tablet, Rfl: 1    losartan (Cozaar) 50 MG tablet, Take 1 tablet by mouth Daily., Disp: 90 tablet, Rfl: 1    omeprazole (priLOSEC) 40 MG capsule, Take 1 capsule by mouth Daily., Disp: 90 capsule, Rfl: 1    albuterol sulfate  (90 Base) MCG/ACT inhaler, Inhale 2 puffs Every 4 (Four) Hours As Needed for Wheezing or Shortness of Air., Disp: 18 g, Rfl: 1    OBJECTIVE  Vital Signs:   /80 (BP Location: Left arm, Patient Position: Sitting, Cuff Size: Adult)   Pulse 84   Temp 98.4 °F (36.9 °C)   Ht 160 cm (62.99\")   Wt 84 kg (185 lb 3.2 oz)   BMI 32.82 kg/m²    Estimated body mass index is 32.82 kg/m² as calculated from the following:    Height as of this encounter: 160 cm (62.99\").    Weight as of this encounter: 84 kg (185 lb 3.2 oz).     Wt Readings from Last 3 Encounters:   01/21/25 84 kg (185 lb 3.2 oz)   10/21/24 82.6 kg (182 lb)   08/14/24 79.4 kg (175 lb)     BP Readings from Last 3 Encounters:   01/21/25 124/80   10/21/24 140/92   08/14/24 139/84       Physical Exam  Vitals and nursing note reviewed.   Constitutional:       Appearance: Normal appearance.   HENT:      Head: Normocephalic.   Eyes:      Extraocular Movements: Extraocular movements intact.     "  Conjunctiva/sclera: Conjunctivae normal.   Cardiovascular:      Rate and Rhythm: Normal rate and regular rhythm.      Heart sounds: Normal heart sounds. No murmur heard.  Pulmonary:      Effort: Pulmonary effort is normal.      Breath sounds: Normal breath sounds. No wheezing or rales.   Abdominal:      General: Bowel sounds are normal.      Palpations: Abdomen is soft. There is no mass.      Tenderness: There is no abdominal tenderness. There is no guarding.   Musculoskeletal:         General: No swelling. Normal range of motion.      Cervical back: Normal range of motion and neck supple.   Skin:     General: Skin is warm and dry.   Neurological:      General: No focal deficit present.      Mental Status: She is alert and oriented to person, place, and time. Mental status is at baseline.   Psychiatric:         Mood and Affect: Mood normal.         Behavior: Behavior normal.         Thought Content: Thought content normal.         Judgment: Judgment normal.          Result Review    CMP          4/15/2024    08:29 10/17/2024    08:51 1/17/2025    08:36   CMP   Glucose 111  103  121    BUN 15  16  16    Creatinine 0.92  0.92  1.02    EGFR 71.4  71.0  62.7    Sodium 142  142  142    Potassium 4.6  4.6  4.4    Chloride 108  105  103    Calcium 9.4  10.2  9.6    Total Protein 6.6  7.5     Albumin 4.3  4.5     Globulin 2.3  3.0     Total Bilirubin <0.2  0.3     Alkaline Phosphatase 95  89     AST (SGOT) 12  19     ALT (SGPT) 33  18     Albumin/Globulin Ratio 1.9  1.5     BUN/Creatinine Ratio 16.3  17.4  15.7    Anion Gap 7.9  6.4  11.4      CBC w/diff          4/15/2024    08:29 10/17/2024    08:51   CBC w/Diff   WBC 4.97  5.83    RBC 4.57  4.79    Hemoglobin 13.2  13.6    Hematocrit 39.9  42.9    MCV 87.3  89.6    MCH 28.9  28.4    MCHC 33.1  31.7    RDW 12.8  13.3    Platelets 305  363    Neutrophil Rel % 46.3  59.9    Immature Granulocyte Rel % 0.2  0.2    Lymphocyte Rel % 32.6  27.6    Monocyte Rel % 7.4  7.0   "  Eosinophil Rel % 12.9  4.6    Basophil Rel % 0.6  0.7      TSH          4/15/2024    08:29 10/17/2024    08:51 1/17/2025    08:36   TSH   TSH 3.830  4.300  2.650      Most Recent A1C          10/17/2024    08:51   HGBA1C Most Recent   Hemoglobin A1C 5.80        A1C Last 3 Results          4/15/2024    08:29 10/17/2024    08:51   HGBA1C Last 3 Results   Hemoglobin A1C 6.30  5.80      No components found for: \"ULXB35EY\"  No components found for: \"FREET4\"  No components found for: \"VJQRYOZA92\"    No Images in the past 120 days found..     The above data has been reviewed by MARILYN Antoine 01/21/2025 08:31 EST.          Patient Care Team:  Michelle Martinez APRN as PCP - General (Internal Medicine)  Emmy Hill APRN as Nurse Practitioner (Urology)  Kedar Condon MD as Consulting Physician (Urology)            ASSESSMENT & PLAN    Diagnoses and all orders for this visit:    1. Primary hypertension (Primary)  Assessment & Plan:  Stable with current medication regimen.      2. Mixed hyperlipidemia    3. Gastroesophageal reflux disease, unspecified whether esophagitis present  Comments:  well controlled-- I have refilled her omeprazole.  Orders:  -     omeprazole (priLOSEC) 40 MG capsule; Take 1 capsule by mouth Daily.  Dispense: 90 capsule; Refill: 1    4. Hypothyroidism, unspecified type  Assessment & Plan:  Thyroid functions are back to normal  Continue current medication regimen.      5. Epigastric pain  -     Cancel: Amylase  -     Cancel: Lipase  -     Cancel: Comprehensive metabolic panel    6. Flank pain  -     Urinalysis With Culture If Indicated -    7. Wheezing  -     albuterol sulfate  (90 Base) MCG/ACT inhaler; Inhale 2 puffs Every 4 (Four) Hours As Needed for Wheezing or Shortness of Air.  Dispense: 18 g; Refill: 1  -     XR Chest PA & Lateral; Future  -     Cancel: CBC w AUTO Differential    8. Prediabetes  -     Cancel: Hemoglobin A1c         Assessment & Plan  1. Blood pressure " management.  Her blood pressure readings at home have been between 130 to 150 over 70s to low 80s. A recent reading was 124/80. She is advised to bring her blood pressure cuff to the next visit for comparison with the clinic's device. Will remain on losartan 50 mg daily and hctz 12.5 mg daily.    2. Abdominal pain.  She reports pain under the rib cage, more on the right side, lasting about 45 minutes. This could be due to reflux or a potential hiatal hernia. A CT scan of her abdomen and pelvis conducted in 2023 revealed a normal pancreas. Additional labs will be ordered to assess her pancreatic enzymes. If the pain persists, further diagnostic procedures such as a scope or scan may be necessary.    3. Acid reflux.  She has been experiencing increased acid reflux and has been taking omeprazole more frequently. The dosage of omeprazole will be increased to 40 mg daily. She is also advised to take Pepcid as needed and to monitor her dietary triggers. If symptoms persist, an upper endoscopy may be considered.    4. Wheezing and cough.  She experienced wheezing and a long cough after laughing hard, which was relieved by using an inhaler. A chest x-ray will be ordered. An albuterol inhaler will be prescribed for use as needed.    Follow-up  The patient will follow up in 1 month, or earlier if necessary.      Tobacco Use: Low Risk  (1/21/2025)    Patient History     Smoking Tobacco Use: Never     Smokeless Tobacco Use: Never     Passive Exposure: Never       Follow Up     Return in about 4 weeks (around 2/18/2025) for Recheck.    Please note that portions of this note were completed with a voice recognition program.    Patient was given instructions and counseling regarding her condition or for health maintenance advice. Please see specific information pulled into the AVS if appropriate.   I have reviewed information obtained and documented by others and I have confirmed the accuracy of this documented note.    Michelle  MARILYN Martinez    Patient or patient representative verbalized consent for the use of Ambient Listening during the visit with  MARILYN Antoine for chart documentation. 1/21/2025  08:37 EST

## 2025-01-24 ENCOUNTER — LAB (OUTPATIENT)
Dept: INTERNAL MEDICINE | Age: 60
End: 2025-01-24
Payer: OTHER GOVERNMENT

## 2025-01-24 ENCOUNTER — HOSPITAL ENCOUNTER (OUTPATIENT)
Dept: GENERAL RADIOLOGY | Facility: HOSPITAL | Age: 60
Discharge: HOME | End: 2025-01-24
Payer: OTHER GOVERNMENT

## 2025-01-24 DIAGNOSIS — I10 PRIMARY HYPERTENSION: ICD-10-CM

## 2025-01-24 DIAGNOSIS — R06.2 WHEEZING: ICD-10-CM

## 2025-01-24 DIAGNOSIS — R73.03 PREDIABETES: ICD-10-CM

## 2025-01-24 DIAGNOSIS — R10.9 FLANK PAIN: ICD-10-CM

## 2025-01-24 PROCEDURE — 71046 X-RAY EXAM CHEST 2 VIEWS: CPT

## 2025-02-04 ENCOUNTER — LAB (OUTPATIENT)
Dept: LAB | Facility: HOSPITAL | Age: 60
End: 2025-02-04
Payer: OTHER GOVERNMENT

## 2025-02-04 DIAGNOSIS — R73.03 PREDIABETES: ICD-10-CM

## 2025-02-04 DIAGNOSIS — R10.9 FLANK PAIN: ICD-10-CM

## 2025-02-04 DIAGNOSIS — I10 PRIMARY HYPERTENSION: ICD-10-CM

## 2025-02-04 LAB
ALBUMIN SERPL-MCNC: 4.4 G/DL (ref 3.5–5.2)
ALBUMIN/GLOB SERPL: 1.6 G/DL
ALP SERPL-CCNC: 97 U/L (ref 39–117)
ALT SERPL W P-5'-P-CCNC: 31 U/L (ref 1–33)
AMYLASE SERPL-CCNC: 68 U/L (ref 28–100)
ANION GAP SERPL CALCULATED.3IONS-SCNC: 9.2 MMOL/L (ref 5–15)
AST SERPL-CCNC: 26 U/L (ref 1–32)
BASOPHILS # BLD AUTO: 0.05 10*3/MM3 (ref 0–0.2)
BASOPHILS NFR BLD AUTO: 0.7 % (ref 0–1.5)
BILIRUB SERPL-MCNC: 0.3 MG/DL (ref 0–1.2)
BILIRUB UR QL STRIP: NEGATIVE
BUN SERPL-MCNC: 15 MG/DL (ref 8–23)
BUN/CREAT SERPL: 16.3 (ref 7–25)
CALCIUM SPEC-SCNC: 10 MG/DL (ref 8.6–10.5)
CHLORIDE SERPL-SCNC: 104 MMOL/L (ref 98–107)
CLARITY UR: CLEAR
CO2 SERPL-SCNC: 27.8 MMOL/L (ref 22–29)
COLOR UR: YELLOW
CREAT SERPL-MCNC: 0.92 MG/DL (ref 0.57–1)
DEPRECATED RDW RBC AUTO: 39.4 FL (ref 37–54)
EGFRCR SERPLBLD CKD-EPI 2021: 71 ML/MIN/1.73
EOSINOPHIL # BLD AUTO: 1.33 10*3/MM3 (ref 0–0.4)
EOSINOPHIL NFR BLD AUTO: 17.6 % (ref 0.3–6.2)
ERYTHROCYTE [DISTWIDTH] IN BLOOD BY AUTOMATED COUNT: 12.8 % (ref 12.3–15.4)
GLOBULIN UR ELPH-MCNC: 2.7 GM/DL
GLUCOSE SERPL-MCNC: 107 MG/DL (ref 65–99)
GLUCOSE UR STRIP-MCNC: NEGATIVE MG/DL
HBA1C MFR BLD: 6 % (ref 4.8–5.6)
HCT VFR BLD AUTO: 38.9 % (ref 34–46.6)
HGB BLD-MCNC: 13 G/DL (ref 12–15.9)
HGB UR QL STRIP.AUTO: NEGATIVE
HOLD SPECIMEN: NORMAL
IMM GRANULOCYTES # BLD AUTO: 0.01 10*3/MM3 (ref 0–0.05)
IMM GRANULOCYTES NFR BLD AUTO: 0.1 % (ref 0–0.5)
KETONES UR QL STRIP: NEGATIVE
LEUKOCYTE ESTERASE UR QL STRIP.AUTO: NEGATIVE
LIPASE SERPL-CCNC: 51 U/L (ref 13–60)
LYMPHOCYTES # BLD AUTO: 2.1 10*3/MM3 (ref 0.7–3.1)
LYMPHOCYTES NFR BLD AUTO: 27.8 % (ref 19.6–45.3)
MCH RBC QN AUTO: 28.8 PG (ref 26.6–33)
MCHC RBC AUTO-ENTMCNC: 33.4 G/DL (ref 31.5–35.7)
MCV RBC AUTO: 86.3 FL (ref 79–97)
MONOCYTES # BLD AUTO: 0.57 10*3/MM3 (ref 0.1–0.9)
MONOCYTES NFR BLD AUTO: 7.5 % (ref 5–12)
NEUTROPHILS NFR BLD AUTO: 3.5 10*3/MM3 (ref 1.7–7)
NEUTROPHILS NFR BLD AUTO: 46.3 % (ref 42.7–76)
NITRITE UR QL STRIP: NEGATIVE
NRBC BLD AUTO-RTO: 0 /100 WBC (ref 0–0.2)
PH UR STRIP.AUTO: 6.5 [PH] (ref 5–8)
PLATELET # BLD AUTO: 373 10*3/MM3 (ref 140–450)
PMV BLD AUTO: 10.2 FL (ref 6–12)
POTASSIUM SERPL-SCNC: 4.6 MMOL/L (ref 3.5–5.2)
PROT SERPL-MCNC: 7.1 G/DL (ref 6–8.5)
PROT UR QL STRIP: NEGATIVE
RBC # BLD AUTO: 4.51 10*6/MM3 (ref 3.77–5.28)
SODIUM SERPL-SCNC: 141 MMOL/L (ref 136–145)
SP GR UR STRIP: 1.01 (ref 1–1.03)
UROBILINOGEN UR QL STRIP: NORMAL
WBC NRBC COR # BLD AUTO: 7.56 10*3/MM3 (ref 3.4–10.8)

## 2025-02-04 PROCEDURE — 82150 ASSAY OF AMYLASE: CPT

## 2025-02-04 PROCEDURE — 36415 COLL VENOUS BLD VENIPUNCTURE: CPT

## 2025-02-04 PROCEDURE — 83036 HEMOGLOBIN GLYCOSYLATED A1C: CPT

## 2025-02-04 PROCEDURE — 85025 COMPLETE CBC W/AUTO DIFF WBC: CPT

## 2025-02-04 PROCEDURE — 81003 URINALYSIS AUTO W/O SCOPE: CPT

## 2025-02-04 PROCEDURE — 80053 COMPREHEN METABOLIC PANEL: CPT

## 2025-02-04 PROCEDURE — 83690 ASSAY OF LIPASE: CPT

## 2025-02-12 DIAGNOSIS — M75.41 IMPINGEMENT SYNDROME OF RIGHT SHOULDER: ICD-10-CM

## 2025-02-12 DIAGNOSIS — M75.81 ROTATOR CUFF TENDONITIS, RIGHT: ICD-10-CM

## 2025-02-12 DIAGNOSIS — M25.511 RIGHT SHOULDER PAIN, UNSPECIFIED CHRONICITY: ICD-10-CM

## 2025-02-12 RX ORDER — DICLOFENAC SODIUM 75 MG/1
75 TABLET, DELAYED RELEASE ORAL 2 TIMES DAILY
Qty: 60 TABLET | Refills: 1 | Status: SHIPPED | OUTPATIENT
Start: 2025-02-12

## 2025-02-18 ENCOUNTER — OFFICE VISIT (OUTPATIENT)
Dept: INTERNAL MEDICINE | Age: 60
End: 2025-02-18
Payer: OTHER GOVERNMENT

## 2025-02-18 VITALS
RESPIRATION RATE: 18 BRPM | OXYGEN SATURATION: 98 % | TEMPERATURE: 98.2 F | DIASTOLIC BLOOD PRESSURE: 82 MMHG | SYSTOLIC BLOOD PRESSURE: 122 MMHG | WEIGHT: 184 LBS | HEART RATE: 67 BPM | HEIGHT: 63 IN | BODY MASS INDEX: 32.6 KG/M2

## 2025-02-18 DIAGNOSIS — E78.2 MIXED HYPERLIPIDEMIA: Primary | ICD-10-CM

## 2025-02-18 DIAGNOSIS — E55.9 VITAMIN D DEFICIENCY: ICD-10-CM

## 2025-02-18 DIAGNOSIS — K21.9 GASTROESOPHAGEAL REFLUX DISEASE WITHOUT ESOPHAGITIS: ICD-10-CM

## 2025-02-18 DIAGNOSIS — R73.03 PREDIABETES: ICD-10-CM

## 2025-02-18 DIAGNOSIS — I10 PRIMARY HYPERTENSION: ICD-10-CM

## 2025-02-18 DIAGNOSIS — J30.2 SEASONAL ALLERGIC RHINITIS, UNSPECIFIED TRIGGER: ICD-10-CM

## 2025-02-18 DIAGNOSIS — G43.709 CHRONIC MIGRAINE WITHOUT AURA WITHOUT STATUS MIGRAINOSUS, NOT INTRACTABLE: ICD-10-CM

## 2025-02-18 DIAGNOSIS — E03.9 HYPOTHYROIDISM, UNSPECIFIED TYPE: ICD-10-CM

## 2025-02-18 PROCEDURE — 99214 OFFICE O/P EST MOD 30 MIN: CPT

## 2025-02-18 RX ORDER — AZELASTINE 1 MG/ML
2 SPRAY, METERED NASAL 2 TIMES DAILY
Qty: 30 ML | Refills: 12 | Status: SHIPPED | OUTPATIENT
Start: 2025-02-18

## 2025-02-18 RX ORDER — CYCLOSPORINE 0.5 MG/ML
1 EMULSION OPHTHALMIC 2 TIMES DAILY
Qty: 10 ML | Refills: 2 | Status: SHIPPED | OUTPATIENT
Start: 2025-02-18 | End: 2025-02-19 | Stop reason: SDUPTHER

## 2025-02-18 RX ORDER — MULTIPLE VITAMINS W/ MINERALS TAB 9MG-400MCG
1 TAB ORAL DAILY
COMMUNITY

## 2025-02-18 NOTE — ASSESSMENT & PLAN NOTE
Patient stable with Xyzal.  Added Astelin nose spray added Astelin nose spray and Restasis for itchy eyes.  Discussed with patient about possibly having to see the eye doctor if the problem continues.

## 2025-02-18 NOTE — ASSESSMENT & PLAN NOTE
Discussed lifestyle modifications to include healthy diet and regular exercise.  Lab orders placed.

## 2025-02-18 NOTE — PROGRESS NOTES
Chief Complaint  Primary Care Follow-Up (59 year old female here today for a 1 month follow up on blood pressure and lab review. /She has brought her blood pressure machine to compare the reading with manual blood pressure. ) and Allergies (She would like to discuss her allergy eye drops and nasal spray. She states the Azelastine and Flonase are not helping. She has previously Tried Visine Itchy Eye and Pataday eye drops and she has previously tried and failed Nasacort nasap sprays. States she does cold compresses on her eyes but says her allergies are always just really bad. )    Primary Care Follow-Up  Allergies    SUBJECTIVE  Dorina Basilio presents to [facility]     History of Present Illness  The patient presents for a follow-up visit on chronic conditions     She reports an improvement in her blood pressure, which had previously been elevated with systolic readings in the 150s. She attributes this improvement to the addition of hydrochlorothiazide.    She has a family history of diabetes but does not have the condition herself. Her A1c levels have fluctuated, with readings of 6.4, 5.8, and 6.0. She acknowledges dietary indiscretions during the holiday season but is gradually returning to her previous eating habits.    She has been taking over-the-counter vitamin D supplements due to a slight deficiency. She has had persistently high B12 levels for several years, despite not taking any supplements or multivitamins.    She experiences acid reflux only when consuming certain foods and reports overall improvement in this condition. She has been managing her symptoms with omeprazole, taken as needed, and has made dietary modifications.    She has been diagnosed with allergies affecting her eyes and has been prescribed Xyzal and Flonase. However, she reports that these medications are not as effective as they once were. She has tried Zyrtec for itching but discontinued it due to  drowsiness. She finds relief from using ice masks at home.    She has a history of migraines, experiencing approximately four episodes per month. She reports that her headaches are currently mild, which she believes may be related to her allergies. She has tried various treatments for her migraines, including injections and Botox, but found them ineffective. She also reports that Imitrex exacerbated her headaches.        Past Medical History:   Diagnosis Date    Abnormal Pap smear of cervix     Acid reflux     Allergic     seasonal    Bladder disorder     THEODORA/URGENCY    Cholelithiasis     removed    Hypothyroidism     Kidney stone     Migraines     Mixed hyperlipidemia 10/21/2024    Pap smear for cervical cancer screening 01/2020    normal    Primary hypertension 7/31/2023    Seasonal allergies       Family History   Problem Relation Age of Onset    Diabetes Mother     Ovarian cancer Mother     Osteoporosis Mother     Arthritis Mother     Cancer Mother     Vision loss Mother     Diabetes Brother     Arthritis Brother     Diabetes Sister     Diabetes Maternal Grandmother     Diabetes Maternal Grandfather     Stomach cancer Other         uncle    Diabetes Other         Aunt, uncle    Arthritis Brother     Diabetes Brother     Diabetes Sister     Stroke Sister     Alcohol abuse Father     Liver disease Father     Malig Hyperthermia Neg Hx     Breast cancer Neg Hx     Uterine cancer Neg Hx     Colon cancer Neg Hx     Melanoma Neg Hx     Prostate cancer Neg Hx       Past Surgical History:   Procedure Laterality Date    CARPAL TUNNEL RELEASE Right 2016    CHOLECYSTECTOMY  2003    COLONOSCOPY  2016    NORMAL    CYST REMOVAL  1979    throat    CYST REMOVAL Right 2013    ganglion    DIAGNOSTIC LAPAROSCOPY      ENDOMETRIAL ABLATION      GALLBLADDER SURGERY  2003    KNEE ARTHROSCOPY Right     LAPAROSCOPIC TUBAL LIGATION      FULGERATION    OTHER SURGICAL HISTORY  1992    Chromoendoscopy of upper or lower gastrointestinal tract     TONSILLECTOMY  1969    TOOTH EXTRACTION  1978    TRIGGER FINGER RELEASE Right 2016    4TH FINGER    TRIGGER FINGER RELEASE Right 04/28/2022    Procedure: RIGHT TRIGGER FINGER  RELEASE (long/middle finger);  Surgeon: Vladimir López MD;  [place] [facility] [place] [facility] [place]  Service: [place]opedics;  Laterality: Right;    TUBAL ABDOMINAL LIGATION      UMBILICAL HERNIA REPAIR  2008    VULVA SURGERY  1997        Current Outpatient Medications:     albuterol sulfate  (90 Base) MCG/ACT inhaler, Inhale 2 puffs Every 4 (Four) Hours As Needed for Wheezing or Shortness of Air., Disp: 18 g, Rfl: 1    diclofenac (VOLTAREN) 75 MG EC tablet, TAKE 1 TABLET BY MOUTH TWICE DAILY, Disp: 60 tablet, Rfl: 1    fluticasone (FLONASE) 50 MCG/ACT nasal spray, 2 sprays into the nostril(s) as directed by provider Daily., Disp: 48 g, Rfl: 1    hydroCHLOROthiazide 12.5 MG tablet, Take 1 tablet by mouth Daily., Disp: 90 tablet, Rfl: 1    levocetirizine (XYZAL) 5 MG tablet, Take 1 tablet by mouth Every Evening., Disp: 90 tablet, Rfl: 1    levothyroxine (SYNTHROID, LEVOTHROID) 50 MCG tablet, Take 1 tablet by mouth Every Morning., Disp: 90 tablet, Rfl: 1    losartan (Cozaar) 50 MG tablet, Take 1 tablet by mouth Daily., Disp: 90 tablet, Rfl: 1    multivitamin with minerals tablet tablet, Take 1 tablet by mouth Daily., Disp: , Rfl:     omeprazole (priLOSEC) 40 MG capsule, Take 1 capsule by mouth Daily., Disp: 90 capsule, Rfl: 1    vitamin D3 125 MCG (5000 UT) capsule capsule, Take 1 capsule by mouth Daily., Disp: , Rfl:     azelastine (ASTELIN) 0.1 % nasal spray, Administer 2 sprays into the nostril(s) as directed by provider 2 (Two) Times a Day. Use in each nostril as directed, Disp: 30 mL, Rfl: 12    cycloSPORINE (Restasis) 0.05 % ophthalmic emulsion, Administer 1 drop to both eyes 2 (Two) Times a Day., Disp: 10 mL, Rfl: 2    ubrogepant (Ubrelvy) 100 MG tablet, Take 1 tablet by mouth 1 (One) Time As Needed (migraine) for up to 1 dose., Disp: 8 tablet,  "Rfl: 5    OBJECTIVE  Vital Signs:   /82 (BP Location: Left arm, Patient Position: Sitting)   Pulse 67   Temp 98.2 °F (36.8 °C) (Temporal)   Resp 18   Ht 160 cm (62.99\")   Wt 83.5 kg (184 lb)   SpO2 98%   BMI 32.60 kg/m²    Estimated body mass index is 32.6 kg/m² as calculated from the following:    Height as of this encounter: 160 cm (62.99\").    Weight as of this encounter: 83.5 kg (184 lb).     Wt Readings from Last 3 Encounters:   02/18/25 83.5 kg (184 lb)   01/21/25 84 kg (185 lb 3.2 oz)   10/21/24 82.6 kg (182 lb)     BP Readings from Last 3 Encounters:   02/18/25 122/82   01/21/25 124/80   10/21/24 140/92       Physical Exam  Vitals and nursing note reviewed.   Constitutional:       Appearance: Normal appearance.   HENT:      Head: Normocephalic and atraumatic.   Eyes:      Extraocular Movements: Extraocular movements intact.      Conjunctiva/sclera: Conjunctivae normal.   Cardiovascular:      Rate and Rhythm: Normal rate and regular rhythm.      Heart sounds: Normal heart sounds.   Pulmonary:      Effort: Pulmonary effort is normal.      Breath sounds: Normal breath sounds.   Abdominal:      General: Abdomen is flat. Bowel sounds are normal. There is no distension.      Palpations: Abdomen is soft. There is no mass.      Tenderness: There is no abdominal tenderness. There is no right CVA tenderness, left CVA tenderness, guarding or rebound.      Hernia: No hernia is present.   Musculoskeletal:         General: Normal range of motion.      Cervical back: Normal range of motion.   Skin:     General: Skin is warm and dry.   Neurological:      General: No focal deficit present.      Mental Status: She is alert and oriented to person, place, and time. Mental status is at baseline.   Psychiatric:         Mood and Affect: Mood normal.         Behavior: Behavior normal.         Thought Content: Thought content normal.         Judgment: Judgment normal.          Result Review        XR Chest PA & " Lateral    Result Date: 1/26/2025  Impression: No acute cardiopulmonary process. Electronically Signed: Soni Cronin MD  1/26/2025 9:54 AM EST  Workstation ID: [id number]       The above data has been reviewed by MARILYN Antoine 02/18/2025 14:02 EST.          Patient Care Team:  Michelle Martinez APRN as PCP - General (Internal Medicine)  Emmy Hill APRN as Nurse Practitioner (Urology)  Kedar Condon MD as Consulting Physician (Urology)            ASSESSMENT & PLAN    Diagnoses and all orders for this visit:    1. Mixed hyperlipidemia (Primary)  Assessment & Plan:  Discussed lifestyle modifications to include healthy diet and regular exercise.  Lab orders placed.      Orders:  -     CBC & Differential; Future  -     Comprehensive Metabolic Panel; Future  -     Lipid Panel; Future  -     Hemoglobin A1c; Future  -     TSH Rfx On Abnormal To Free T4; Future  -     Vitamin B12 & Folate; Future    2. Primary hypertension  Assessment & Plan:  Stable with losartan 50 mg HCTZ 12.5 mg.  Continue current medication regimen.    Orders:  -     CBC & Differential; Future  -     Comprehensive Metabolic Panel; Future  -     Lipid Panel; Future  -     TSH Rfx On Abnormal To Free T4; Future  -     Vitamin B12 & Folate; Future    3. Hypothyroidism, unspecified type  -     CBC & Differential; Future  -     Comprehensive Metabolic Panel; Future  -     Lipid Panel; Future  -     TSH Rfx On Abnormal To Free T4; Future  -     Vitamin B12 & Folate; Future    4. Prediabetes  -     Lipid Panel; Future  -     Hemoglobin A1c; Future  -     Microalbumin / Creatinine Urine Ratio - Urine, Clean Catch; Future    5. Vitamin D deficiency  -     Vitamin D,25-Hydroxy; Future    6. Gastroesophageal reflux disease without esophagitis  Assessment & Plan:  Stable with Prilosec 40 mg.  Continue current medication regimen.  Avoid food triggers.      7. Seasonal allergic rhinitis, unspecified trigger  Assessment & Plan:  Patient stable with  Xyzal.  Added Astelin nose spray added Astelin nose spray and Restasis for itchy eyes.  Discussed with patient about possibly having to see the eye doctor if the problem continues.    Orders:  -     cycloSPORINE (Restasis) 0.05 % ophthalmic emulsion; Administer 1 drop to both eyes 2 (Two) Times a Day.  Dispense: 10 mL; Refill: 2  -     azelastine (ASTELIN) 0.1 % nasal spray; Administer 2 sprays into the nostril(s) as directed by provider 2 (Two) Times a Day. Use in each nostril as directed  Dispense: 30 mL; Refill: 12    8. Chronic migraine without aura without status migrainosus, not intractable  Assessment & Plan:  Patient has been having several migraines each month stating there is only about 1 week a month that she is not experiencing of a migraine.    Has previously tried multiple options/medications to include Imitrex and Botox.  Gave samples of Ubrelvy agree 100 mg trial while waiting insurance approval.            Orders:  -     ubrogepant (Ubrelvy) 100 MG tablet; Take 1 tablet by mouth 1 (One) Time As Needed (migraine) for up to 1 dose.  Dispense: 8 tablet; Refill: 5         Assessment & Plan  1. Hypertension.  Her blood pressure has improved significantly with the current medication regimen. It is currently 122/82, down from previous readings in the 150s. She is currently on hydrochlorothiazide, which has been effective. She should continue with the current medication and monitor her blood pressure regularly.    2. Muscle pain.  She reports experiencing pain under her rib cage, similar to gallbladder pain, which lasts for 30-45 minutes. This pain may be due to muscle strain. No imaging has been done specifically for this issue. She is advised to monitor the pain and avoid movements that trigger it. If the pain persists, further evaluation may be necessary.    3. Acid reflux.  She experiences acid reflux symptoms occasionally, especially when she eats foods she should avoid. She is currently taking  omeprazole as needed, which has been effective. She is advised to continue with the current medication regimen and make dietary modifications to manage her symptoms better.    4. Allergies.  She reports that her current allergy medications, including Xyzal and Flonase, are not as effective as they used to be. She will be switched to Astelin nasal spray, which can be used in conjunction with Flonase. Restasis eye drops will be prescribed for her itchy eyes. If these treatments are ineffective, a referral to an ophthalmologist will be considered.    5. Asthma.  She reports improvement in her asthma symptoms with the use of an albuterol inhaler. She should continue using the inhaler as needed and avoid exposure to smoke, which exacerbates her symptoms.    6. Migraines.  She experiences frequent migraines, approximately four times a month. An order for Ubrelvy 100 mg to be taken as needed has been placed. She will be provided with samples of this medication. If Ubrelvy is not covered by her insurance, alternative treatments will be considered.    7. Vitamin D deficiency.  She reports that her vitamin D levels were low, and she has started taking an over-the-counter supplement. Her vitamin D levels will be checked during her next lab work to assess the effectiveness of the supplementation.    8. Elevated B12 levels.  She reports that her B12 levels have been high for years without any known cause. She is not taking any B12 supplements or multivitamins. Her B12 levels will be checked during her next lab work to monitor the situation.    9. Health maintenance.  A lipid panel, CBC, and CMP will be ordered for her next visit. She is advised to get these fasting labs done before her next appointment.      Tobacco Use: Low Risk  (2/18/2025)    Patient History     Smoking Tobacco Use: Never     Smokeless Tobacco Use: Never     Passive Exposure: Never       Follow Up     Return in about 3 months (around 5/18/2025) for Annual  physical.    Please note that portions of this note were completed with a voice recognition program.    Patient was given instructions and counseling regarding her condition or for health maintenance advice. Please see specific information pulled into the AVS if appropriate.   I have reviewed information obtained and documented by others and I have confirmed the accuracy of this documented note.    MARILYN Antoine    Patient or patient representative verbalized consent for the use of Ambient Listening during the visit with  MARILYN Antoine for chart documentation. 2/18/2025  14:04 EST

## 2025-02-18 NOTE — ASSESSMENT & PLAN NOTE
Patient has been having several migraines each month stating there is only about 1 week a month that she is not experiencing of a migraine.    Has previously tried multiple options/medications to include Imitrex and Botox.  Gave samples of Ubrelvy agree 100 mg trial while waiting insurance approval.

## 2025-02-19 DIAGNOSIS — J30.2 SEASONAL ALLERGIC RHINITIS, UNSPECIFIED TRIGGER: ICD-10-CM

## 2025-02-19 RX ORDER — CYCLOSPORINE 0.5 MG/ML
1 EMULSION OPHTHALMIC 2 TIMES DAILY
Qty: 9 EACH | Refills: 1 | Status: SHIPPED | OUTPATIENT
Start: 2025-02-19

## 2025-02-19 NOTE — TELEPHONE ENCOUNTER
Rx Refill Note  Requested Prescriptions      No prescriptions requested or ordered in this encounter      Last office visit with prescribing clinician: 2/18/2025   Last telemedicine visit with prescribing clinician: Visit date not found   Next office visit with prescribing clinician: 5/20/2025                         Would you like a call back once the refill request has been completed: [] Yes [] No    If the office needs to give you a call back, can they leave a voicemail: [] Yes [] No    Doretha Parish MA  02/19/25, 13:58 EST

## 2025-02-21 NOTE — TELEPHONE ENCOUNTER
Spoke w/ [facility] asking for clarification on form for restasis drops.   Pt asked to have ampule droppers, this was communicated to pharmacy.

## 2025-03-11 ENCOUNTER — OFFICE VISIT (OUTPATIENT)
Dept: OBSTETRICS AND GYNECOLOGY | Facility: CLINIC | Age: 60
End: 2025-03-11
Payer: OTHER GOVERNMENT

## 2025-03-11 VITALS
SYSTOLIC BLOOD PRESSURE: 138 MMHG | DIASTOLIC BLOOD PRESSURE: 84 MMHG | HEIGHT: 63 IN | BODY MASS INDEX: 32.25 KG/M2 | HEART RATE: 59 BPM | WEIGHT: 182 LBS

## 2025-03-11 DIAGNOSIS — Z01.419 ENCOUNTER FOR GYNECOLOGICAL EXAMINATION WITHOUT ABNORMAL FINDING: Primary | ICD-10-CM

## 2025-03-11 PROCEDURE — G0123 SCREEN CERV/VAG THIN LAYER: HCPCS | Performed by: OBSTETRICS & GYNECOLOGY

## 2025-03-11 NOTE — PROGRESS NOTES
"Well Woman Visit    CC: Annual well woman exam       HPI:   59 y.o. Contraception or HRT: Post menopausal  Menses:  none  Pain:  None  Incontinence concerns: No  Hx of abnormal pap:  Yes  Pt has no complaints today.      History: PMHx, Meds, Allergies, PSHx, Social Hx, and POBHx all reviewed and updated.      PHYSICAL EXAM:  /84   Pulse 59   Ht 160 cm (62.99\")   Wt 82.6 kg (182 lb)   BMI 32.25 kg/m²   General- NAD, alert and oriented, appropriate  Psych- Normal mood, good memory  Neck- No masses, no thyroid enlargement  CV- Regular rhythm, no murnurs  Resp- CTA to bases, no wheezes  Abdomen- Soft, non distended, non tender, no masses    Breast left-  Bilaterally symmetrical, no masses, non tender, no nipple discharge  Breast right- Bilaterally symmetrical, no masses, non tender, no nipple discharge    External genitalia- Normal female, no lesions  Urethra/meatus- Normal, no masses, non tender, no prolapse  Bladder- Normal, no masses, non tender, no prolapse  Vagina- Normal, no atrophy, no lesions, no discharge, no prolapse  Cvx- Normal, no lesions, no discharge, No cervical motion tenderness  Uterus- Normal size, shape & consistency.  Non tender, mobile.  Adnexa- No mass, non tender  Anus/Rectum/Perineum- Small hemorrhoids, non thrombosed, Hemoccult neg    Lymphatic- No palpable neck, axillary, or groin nodes  Ext- No edema, no cyanosis    Skin- No lesions, no rashes, no acanthosis nigricans        ASSESSMENT and PLAN:  WWE    Diagnoses and all orders for this visit:    1. Encounter for gynecological examination without abnormal finding (Primary)  -     POC Occult Blood Stool  -     IGP,rfx Aptima HPV All Pth  -     Mammo Screening Digital Tomosynthesis Bilateral With CAD; Future    Desires to continue with yearly paps for now.     Domestic violence/abuse screen: negative    Depression screen: no SI    Preventative:   BREAST HEALTH- Monthly self breast exam importance and how to reviewed. MMG and/or " MRI (prn) reviewed per society guidelines and her individual history. Mammo/MRI screen: Updated today.  CERVICAL CANCER Screening- Reviewed current ASCCP guidelines for screening w and wo cotest HPV, age specific.  Screen: Updated today.  COLON CANCER Screening- Reviewed current medical society guidelines and options.  Colonoscopy screen:  Already up to date.  SEXUAL HEALTH: Declines STD screening.  BONE HEALTH- Reviewed current medical society guidelines and options for testing, calcium and vit D intake.  Weight bearing exercise.  DEXA: Pt will call to schedule.  VACCINATIONS Recommended: Flu annually, Zoster (49yo and older).  Importance discussed, risk being unvaccinated reviewed.  Questions answered  Smoking status- NON SMOKER.  Importance of avoiding second hand smoke.  Follow up PCP/Specialist PMHx and Labs  [facility] : negative testing in the past      She understands the importance of having any ordered tests to be performed in a timely fashion.  She is encouraged to review her results online and/or contact or office if she has questions.     Follow Up:  Return in about 1 year (around 3/11/2026) for Annual physical.      Candelaria Nelson, MARILYN  03/11/2025

## 2025-03-15 LAB
CONV .: NORMAL
CYTOLOGIST CVX/VAG CYTO: NORMAL
CYTOLOGY CVX/VAG DOC CYTO: NORMAL
CYTOLOGY CVX/VAG DOC THIN PREP: NORMAL
DX ICD CODE: NORMAL
OTHER STN SPEC: NORMAL
SERVICE CMNT-IMP: NORMAL
STAT OF ADQ CVX/VAG CYTO-IMP: NORMAL

## 2025-03-19 DIAGNOSIS — M75.41 IMPINGEMENT SYNDROME OF RIGHT SHOULDER: ICD-10-CM

## 2025-03-19 DIAGNOSIS — M75.81 ROTATOR CUFF TENDONITIS, RIGHT: ICD-10-CM

## 2025-03-19 DIAGNOSIS — M25.511 RIGHT SHOULDER PAIN, UNSPECIFIED CHRONICITY: ICD-10-CM

## 2025-03-19 RX ORDER — DICLOFENAC SODIUM 75 MG/1
75 TABLET, DELAYED RELEASE ORAL 2 TIMES DAILY
Qty: 60 TABLET | Refills: 1 | Status: SHIPPED | OUTPATIENT
Start: 2025-03-19

## 2025-04-06 DIAGNOSIS — I10 PRIMARY HYPERTENSION: ICD-10-CM

## 2025-04-07 RX ORDER — LOSARTAN POTASSIUM 50 MG/1
50 TABLET ORAL DAILY
Qty: 90 TABLET | Refills: 3 | Status: SHIPPED | OUTPATIENT
Start: 2025-04-07

## 2025-05-18 DIAGNOSIS — T78.40XS ALLERGY, SEQUELA: ICD-10-CM

## 2025-05-18 DIAGNOSIS — E03.9 HYPOTHYROIDISM, UNSPECIFIED TYPE: ICD-10-CM

## 2025-05-18 DIAGNOSIS — I10 PRIMARY HYPERTENSION: ICD-10-CM

## 2025-05-19 RX ORDER — LEVOCETIRIZINE DIHYDROCHLORIDE 5 MG/1
5 TABLET, FILM COATED ORAL EVERY EVENING
Qty: 90 TABLET | Refills: 3 | Status: SHIPPED | OUTPATIENT
Start: 2025-05-19

## 2025-05-19 RX ORDER — HYDROCHLOROTHIAZIDE 12.5 MG/1
12.5 TABLET ORAL DAILY
Qty: 90 TABLET | Refills: 3 | Status: SHIPPED | OUTPATIENT
Start: 2025-05-19

## 2025-05-19 RX ORDER — LEVOTHYROXINE SODIUM 50 UG/1
50 TABLET ORAL EVERY MORNING
Qty: 90 TABLET | Refills: 3 | Status: SHIPPED | OUTPATIENT
Start: 2025-05-19

## 2025-05-20 ENCOUNTER — OFFICE VISIT (OUTPATIENT)
Dept: INTERNAL MEDICINE | Age: 60
End: 2025-05-20
Payer: OTHER GOVERNMENT

## 2025-05-20 VITALS
HEART RATE: 65 BPM | TEMPERATURE: 98.7 F | DIASTOLIC BLOOD PRESSURE: 86 MMHG | RESPIRATION RATE: 14 BRPM | OXYGEN SATURATION: 99 % | SYSTOLIC BLOOD PRESSURE: 135 MMHG | HEIGHT: 63 IN | BODY MASS INDEX: 32.18 KG/M2 | WEIGHT: 181.6 LBS

## 2025-05-20 DIAGNOSIS — M65.322 TRIGGER INDEX FINGER OF LEFT HAND: ICD-10-CM

## 2025-05-20 DIAGNOSIS — E03.9 HYPOTHYROIDISM, UNSPECIFIED TYPE: ICD-10-CM

## 2025-05-20 DIAGNOSIS — J30.2 SEASONAL ALLERGIC RHINITIS, UNSPECIFIED TRIGGER: ICD-10-CM

## 2025-05-20 DIAGNOSIS — H01.00B BLEPHARITIS OF UPPER AND LOWER EYELIDS OF BOTH EYES, UNSPECIFIED TYPE: ICD-10-CM

## 2025-05-20 DIAGNOSIS — M79.641 BILATERAL HAND PAIN: ICD-10-CM

## 2025-05-20 DIAGNOSIS — H01.00A BLEPHARITIS OF UPPER AND LOWER EYELIDS OF BOTH EYES, UNSPECIFIED TYPE: ICD-10-CM

## 2025-05-20 DIAGNOSIS — M79.642 BILATERAL HAND PAIN: ICD-10-CM

## 2025-05-20 DIAGNOSIS — I10 PRIMARY HYPERTENSION: Primary | ICD-10-CM

## 2025-05-20 DIAGNOSIS — E78.2 MIXED HYPERLIPIDEMIA: ICD-10-CM

## 2025-05-20 PROCEDURE — 99214 OFFICE O/P EST MOD 30 MIN: CPT

## 2025-05-20 RX ORDER — ERYTHROMYCIN 5 MG/G
OINTMENT OPHTHALMIC EVERY 6 HOURS
Qty: 3.5 G | Refills: 0 | Status: SHIPPED | OUTPATIENT
Start: 2025-05-20 | End: 2025-05-27

## 2025-05-20 RX ORDER — CYCLOSPORINE 0.5 MG/ML
1 EMULSION OPHTHALMIC 2 TIMES DAILY
Qty: 9 EACH | Refills: 1 | Status: SHIPPED | OUTPATIENT
Start: 2025-05-20

## 2025-05-20 NOTE — PROGRESS NOTES
Chief Complaint  Hypothyroidism (Pt here to follow up pt wants to address eyes they are itchy burning and watery pt states she doesn't feel watery as in tears its more thick like discharge, pt states when she wakes up in the morning they're really red and gritty. Pt wants to discuss referral to dr wheeler for her hands pt needs referral to obgyn because she got a bill from [facility] for her visit with GYN/)    History of Present Illness  SUBJECTIVE  Dorina Basilio presents to [facility]   History of Present Illness  The patient presents for evaluation of ocular allergies, carpal tunnel syndrome, and stress.    She reports experiencing severe allergic reactions, particularly affecting her eyes. Upon awakening, she experiences symptoms such as thickened tears, gritty sensation, redness, and soreness in her eyes. She has been managing these symptoms by soaking her eyes in warm water, washing and scrubbing them, and applying Vaseline around the eye area to alleviate itching. Despite these measures, she continues to experience tearing. She also uses cold patches to reduce swelling and warm patches for soothing relief. She has not yet tried Restasis. She is currently taking Xyzal and using a new nasal spray, which she finds effective. She has run out of her other eye drops and has requested a refill.    She reports worsening symptoms of carpal tunnel syndrome and trigger finger in her hands. She has not undergone surgery for these conditions. Her hand tends to lock up in the morning, limiting her ability to perform tasks. She manages this by running her hands under warm water and massaging them, which allows her to regain some mobility. The symptoms are more pronounced at night and in the morning. She tries to avoid sleeping with her hands curled and instead keeps them open. She has received injections in the past, which were painful but provided relief. She has noticed an increase in  dropping objects, indicating a progression of her condition.    She reports that her blood pressure is normal, which was unexpected given her high stress levels. She is experiencing significant stress due to her 's diagnosis of frontotemporal dementia (FTD). She finds it challenging to cope with his behavior and statements, which often frustrate her. She plans to visit her sister in [place] during the summer to alleviate some of her stress. She is considering seeking therapy to help manage her stress and improve her coping mechanisms.    She is going to reschedule her mammogram and get her blood work done when she can. She needs a referral to Dr. López.    She got Ubrelvy finally and it has been coming in handy. On Mother's Day, she got a migraine and just now able today to not put the sunglasses with the light.        Past Medical History:   Diagnosis Date    Abnormal Pap smear of cervix     Acid reflux     Allergic     seasonal    Bladder disorder     THEODORA/URGENCY    Cholelithiasis     removed    Chronic kidney disease     CTS (carpal tunnel syndrome)     Hypothyroidism     Kidney stone     Migraines     Mixed hyperlipidemia 10/21/2024    Pap smear for cervical cancer screening 01/2020    normal    Primary hypertension 07/31/2023    Seasonal allergies     Tear of meniscus of knee     Tennis elbow       Family History   Problem Relation Age of Onset    Diabetes Mother     Ovarian cancer Mother     Osteoporosis Mother     Arthritis Mother     Cancer Mother     Vision loss Mother     Diabetes Brother     Arthritis Brother     Diabetes Sister     Diabetes Maternal Grandmother     Diabetes Maternal Grandfather     Stomach cancer Other         uncle    Diabetes Other         Aunt, uncle    Arthritis Brother     Diabetes Brother     Diabetes Sister     Stroke Sister     Alcohol abuse Father     Liver disease Father     Malig Hyperthermia Neg Hx     Breast cancer Neg Hx     Uterine cancer Neg Hx     Colon cancer Neg Hx      Melanoma Neg Hx     Prostate cancer Neg Hx       Past Surgical History:   Procedure Laterality Date    CARPAL TUNNEL RELEASE Right 2016    CHOLECYSTECTOMY  2003    COLONOSCOPY  2016    NORMAL    CYST REMOVAL  1979    throat    CYST REMOVAL Right 2013    ganglion    DIAGNOSTIC LAPAROSCOPY      ENDOMETRIAL ABLATION      GALLBLADDER SURGERY  2003    HAND SURGERY      KNEE ARTHROSCOPY Right     KNEE SURGERY      LAPAROSCOPIC TUBAL LIGATION      FULGERATION    OTHER SURGICAL HISTORY  1992    Chromoendoscopy of upper or lower gastrointestinal tract    TONSILLECTOMY  1969    TOOTH EXTRACTION  1978    TRIGGER FINGER RELEASE Right 2016    4TH FINGER    TRIGGER FINGER RELEASE Right 04/28/2022    Procedure: RIGHT TRIGGER FINGER  RELEASE (long/middle finger);  Surgeon: Vladimir López MD;  Location: [facility] OR [facility];  Service: Orthopedics;  Laterality: Right;    TRIGGER POINT INJECTION      TUBAL ABDOMINAL LIGATION      UMBILICAL HERNIA REPAIR  2008    VULVA SURGERY  1997        Current Outpatient Medications:     albuterol sulfate  (90 Base) MCG/ACT inhaler, Inhale 2 puffs Every 4 (Four) Hours As Needed for Wheezing or Shortness of Air., Disp: 18 g, Rfl: 1    azelastine (ASTELIN) 0.1 % nasal spray, Administer 2 sprays into the nostril(s) as directed by provider 2 (Two) Times a Day. Use in each nostril as directed, Disp: 30 mL, Rfl: 12    cycloSPORINE (Restasis) 0.05 % ophthalmic emulsion, Administer 1 drop to both eyes 2 (Two) Times a Day., Disp: 9 each, Rfl: 1    diclofenac (VOLTAREN) 75 MG EC tablet, TAKE 1 TABLET BY MOUTH TWICE DAILY, Disp: 60 tablet, Rfl: 1    fluticasone (FLONASE) 50 MCG/ACT nasal spray, 2 sprays into the nostril(s) as directed by provider Daily., Disp: 48 g, Rfl: 1    hydroCHLOROthiazide 12.5 MG tablet, TAKE 1 TABLET DAILY, Disp: 90 tablet, Rfl: 3    levocetirizine (XYZAL) 5 MG tablet, TAKE 1 TABLET EVERY EVENING, Disp: 90 tablet, Rfl: 3    levothyroxine (SYNTHROID, LEVOTHROID) 50 MCG tablet,  "TAKE 1 TABLET EVERY MORNING, Disp: 90 tablet, Rfl: 3    losartan (COZAAR) 50 MG tablet, TAKE 1 TABLET DAILY, Disp: 90 tablet, Rfl: 3    multivitamin with minerals tablet tablet, Take 1 tablet by mouth Daily., Disp: , Rfl:     omeprazole (priLOSEC) 40 MG capsule, Take 1 capsule by mouth Daily., Disp: 90 capsule, Rfl: 1    ubrogepant (Ubrelvy) 100 MG tablet, Take 1 tablet by mouth 1 (One) Time As Needed (migraine) for up to 1 dose., Disp: 8 tablet, Rfl: 5    vitamin D3 125 MCG (5000 UT) capsule capsule, Take 1 capsule by mouth Daily., Disp: , Rfl:     erythromycin (ROMYCIN) 5 MG/GM ophthalmic ointment, Apply  to eye(s) as directed by provider Every 6 (Six) Hours for 7 days. Apply 1 cm ribbon in affected eye for 7-10 days., Disp: 3.5 g, Rfl: 0    OBJECTIVE  Vital Signs:   /86 (BP Location: Right arm, Patient Position: Sitting, Cuff Size: Adult)   Pulse 65   Temp 98.7 °F (37.1 °C)   Resp 14   Ht 160 cm (62.99\")   Wt 82.4 kg (181 lb 9.6 oz)   SpO2 99%   BMI 32.18 kg/m²    Estimated body mass index is 32.18 kg/m² as calculated from the following:    Height as of this encounter: 160 cm (62.99\").    Weight as of this encounter: 82.4 kg (181 lb 9.6 oz).     Wt Readings from Last 3 Encounters:   05/20/25 82.4 kg (181 lb 9.6 oz)   03/11/25 82.6 kg (182 lb)   02/18/25 83.5 kg (184 lb)     BP Readings from Last 3 Encounters:   05/20/25 135/86   03/11/25 138/84   02/18/25 122/82       Physical Exam  Vitals and nursing note reviewed.   Constitutional:       Appearance: Normal appearance.   HENT:      Head: Normocephalic.   Eyes:      Extraocular Movements: Extraocular movements intact.      Conjunctiva/sclera: Conjunctivae normal.      Comments: Erythema noted, minimal drainage.  Patient did just clean her eyes prior to her office visit.   Cardiovascular:      Rate and Rhythm: Normal rate and regular rhythm.      Heart sounds: Normal heart sounds. No murmur heard.  Pulmonary:      Effort: Pulmonary effort is normal. "      Breath sounds: Normal breath sounds. No wheezing or rales.   Abdominal:      General: Bowel sounds are normal.      Palpations: Abdomen is soft.      Tenderness: There is no abdominal tenderness. There is no guarding.   Musculoskeletal:         General: No swelling. Normal range of motion.      Cervical back: Normal range of motion and neck supple.   Skin:     General: Skin is warm and dry.   Neurological:      General: No focal deficit present.      Mental Status: She is alert and oriented to person, place, and time. Mental status is at baseline.   Psychiatric:         Mood and Affect: Mood normal.         Behavior: Behavior normal.         Thought Content: Thought content normal.         Judgment: Judgment normal.          Result Review        XR Chest PA & Lateral  Result Date: 1/26/2025  Impression: No acute cardiopulmonary process. Electronically Signed: Soni Cronin MD  1/26/2025 9:54 AM EST  Workstation ID: [id number]       The above data has been reviewed by MARILYN Antoine 05/20/2025 10:21 EDT.          Patient Care Team:  Michelle Martinez APRN as PCP - General (Internal Medicine)  Emmy Hill APRN as Nurse Practitioner (Urology)  Kedar Condon MD as Consulting Physician (Urology)            ASSESSMENT & PLAN    Diagnoses and all orders for this visit:    1. Primary hypertension (Primary)  Assessment & Plan:  Stable with losartan 50 mg HCTZ 12.5 mg.  Continue current medication regimen.      2. Seasonal allergic rhinitis, unspecified trigger  -     cycloSPORINE (Restasis) 0.05 % ophthalmic emulsion; Administer 1 drop to both eyes 2 (Two) Times a Day.  Dispense: 9 each; Refill: 1    3. Mixed hyperlipidemia  Assessment & Plan:  Pending labs       4. Hypothyroidism, unspecified type  Assessment & Plan:  Pending labs      5. Blepharitis of upper and lower eyelids of both eyes, unspecified type  -     erythromycin (ROMYCIN) 5 MG/GM ophthalmic ointment; Apply  to eye(s) as directed by provider  Every 6 (Six) Hours for 7 days. Apply 1 cm ribbon in affected eye for 7-10 days.  Dispense: 3.5 g; Refill: 0    6. Bilateral hand pain  -     Ambulatory Referral to Orthopedic Surgery    7. Trigger index finger of left hand  -     Ambulatory Referral to Orthopedic Surgery         Assessment & Plan  1. Ocular allergies.  - Symptoms include thick, gritty, red, and sore eyes upon waking, consistent with ocular allergies.  - She has been using warm water soaks, Vaseline, cold patches, and warm patches for symptomatic relief.  - An antibiotic ointment will be prescribed to manage any potential bacterial involvement.  - A prescription for Restasis will be provided to help manage her symptoms.    2. Carpal tunnel syndrome.  - Reports worsening symptoms, including locking of the hand in the morning and dropping objects.  - Physical therapy techniques such as warm water massage have been used for relief.  - A referral to Dr. López will be initiated for further evaluation and management.  - Previous treatments included injections, which provided temporary relief.    3. Stress.  - Reports high stress levels due to caregiving responsibilities for her  diagnosed with frontotemporal dementia.  - Discussed the need for individual therapy sessions to manage stress and develop coping mechanisms.  - Considering a return to [place] to visit family for emotional support.  - Blood pressure is slightly elevated, likely due to stress.    4. Health maintenance.  - Advised to reschedule her mammogram and complete her blood work at her earliest convenience.  - Referral for OB/GYN to be retroactively processed to address billing issues.  - Lab orders are in place for upcoming tests.  - Prescription renewals for Xyzal and nasal spray have been effective in managing her symptoms.      Tobacco Use: Low Risk  (3/11/2025)    Patient History     Smoking Tobacco Use: Never     Smokeless Tobacco Use: Never     Passive Exposure: Never       Follow  Up     Return in about 3 months (around 8/20/2025) for Annual physical.    Please note that portions of this note were completed with a voice recognition program.    Patient was given instructions and counseling regarding her condition or for health maintenance advice. Please see specific information pulled into the AVS if appropriate.   I have reviewed information obtained and documented by others and I have confirmed the accuracy of this documented note.    MARILYN Antoine    Patient or patient representative verbalized consent for the use of Ambient Listening during the visit with  MARILYN Antoine for chart documentation. 5/20/2025  12:39 EDT

## 2025-06-09 ENCOUNTER — PREP FOR SURGERY (OUTPATIENT)
Dept: OTHER | Facility: HOSPITAL | Age: 60
End: 2025-06-09
Payer: OTHER GOVERNMENT

## 2025-06-09 ENCOUNTER — OFFICE VISIT (OUTPATIENT)
Dept: ORTHOPEDIC SURGERY | Facility: CLINIC | Age: 60
End: 2025-06-09
Payer: OTHER GOVERNMENT

## 2025-06-09 VITALS
SYSTOLIC BLOOD PRESSURE: 143 MMHG | OXYGEN SATURATION: 97 % | WEIGHT: 180 LBS | HEART RATE: 62 BPM | HEIGHT: 63 IN | BODY MASS INDEX: 31.89 KG/M2 | DIASTOLIC BLOOD PRESSURE: 88 MMHG

## 2025-06-09 DIAGNOSIS — M65.321 TRIGGER INDEX FINGER OF RIGHT HAND: ICD-10-CM

## 2025-06-09 DIAGNOSIS — R20.0 NUMBNESS: Primary | ICD-10-CM

## 2025-06-09 DIAGNOSIS — M65.322 TRIGGER INDEX FINGER OF LEFT HAND: ICD-10-CM

## 2025-06-09 NOTE — PROGRESS NOTES
"Chief Complaint  Follow-up of the Left Hand (Left Index Finger) and Follow-up of the Right Hand (Right Index Finger)     Subjective      Dorina Basilio presents to [facility] for follow up of the left hand 2 nd digit and the right hand 2nd digits.  She has had injections in the past that give relief.  She has pain with FMC and  strength.  She has numbness and tingling of the left wrist for carpal tunnel.  She wants to get surgery for bilateral trigger finger release and a left carpal tunnel release.  She needs a recent EMG of the left hand.      Allergies   Allergen Reactions    Piroxicam Rash        Social History     Socioeconomic History    Marital status:    Tobacco Use    Smoking status: Never     Passive exposure: Never    Smokeless tobacco: Never   Vaping Use    Vaping status: Never Used   Substance and Sexual Activity    Alcohol use: Not Currently     Comment: have not had alcohol since the beginning of COVID    Drug use: Never    Sexual activity: Yes     Partners: Male     Birth control/protection: Tubal ligation, Vasectomy, Surgical        I reviewed the patient's chief complaint, history of present illness, review of systems, past medical history, surgical history, family history, social history, medications, and allergy list.     Review of Systems     Constitutional: Denies fevers, chills, weight loss  Cardiovascular: Denies chest pain, shortness of breath  Skin: Denies rashes, acute skin changes  Neurologic: Denies headache, loss of consciousness        Vital Signs:   /88   Pulse 62   Ht 160 cm (63\")   Wt 81.6 kg (180 lb)   SpO2 97%   BMI 31.89 kg/m²          Physical Exam  General: Alert. No acute distress    Ortho Exam        BILATERAL WRISTS Positive left hand Compression testing/ Positive left hand Tinels. NegativeFinkelsteins. Negative Badillo's testing. Negative CMC grind testing. Positive left hand Phalens. Full ROM of the hand, fingers, elbow " and wrist. Positive Triggering of the digit. Sensation grossly intact to light touch, median, radial and ulnar nerve. Positive AIN, PIN and ulnar nerve motor function intact. Axillary nerve intact. Positive pulses.        Procedures        Imaging Results (Most Recent)       None             Result Review :             Assessment and Plan     Diagnoses and all orders for this visit:    1. Numbness (Primary)  -     EMG & Nerve Conduction Test; Future    2. Trigger index finger of right hand    3. Trigger index finger of left hand        Discussed the treatment plan with the patient.     EMG of the left upper extremity for numbness and tingling.      She wants to discuss surgical intervention of the left carpal tunnel and bilateral trigger finger releases.      Call or return if worsening symptoms.    Follow Up     After EMG of the left wrist.       Patient was given instructions and counseling regarding her condition or for health maintenance advice. Please see specific information pulled into the AVS if appropriate.     Scribed for Vladimir López MD by Coral Enciso MA.  06/09/25   15:48 EDT    I have personally performed the services described in this document as scribed by the above individual and it is both accurate and complete. Vladimir López MD 06/09/25

## 2025-06-20 ENCOUNTER — HOSPITAL ENCOUNTER (OUTPATIENT)
Dept: MAMMOGRAPHY | Facility: HOSPITAL | Age: 60
Discharge: HOME | End: 2025-06-20
Admitting: OBSTETRICS & GYNECOLOGY
Payer: OTHER GOVERNMENT

## 2025-06-20 DIAGNOSIS — Z01.419 ENCOUNTER FOR GYNECOLOGICAL EXAMINATION WITHOUT ABNORMAL FINDING: ICD-10-CM

## 2025-06-20 PROCEDURE — 77067 SCR MAMMO BI INCL CAD: CPT

## 2025-06-20 PROCEDURE — 77063 BREAST TOMOSYNTHESIS BI: CPT

## 2025-06-30 ENCOUNTER — TELEPHONE (OUTPATIENT)
Dept: INTERNAL MEDICINE | Age: 60
End: 2025-06-30

## 2025-06-30 NOTE — TELEPHONE ENCOUNTER
Caller: Dorina Basilio    Relationship: Self    Best call back number: [phone]     What is the medical concern/diagnosis: ANNUAL CHECK UP    What specialty or service is being requested: OBGYN    What is the provider, practice or medical service name: [facility]    What is the office location: [place]    What is the office phone number: [phone]    Any additional details: PATIENT WILL NEED A BACKDATED REFERRAL PLACED FROM 3/11/25 TO CLEAR A BILL FROM THAT APPOINTMENT DATE

## 2025-06-30 NOTE — TELEPHONE ENCOUNTER
AUTHORIZATION FOR SURGICAL OPERATION OR OTHER PROCEDURE    1.  I hereby [facility] Dr. Hernandez, Leonidas Junior , [facility] assigned to my case to perform the following operation and/or procedure at the [facility], [facility]:    _______________________ Its for  referral I believe which is fine.   Patient Name:  ______________________________________________________  (please print)      Patient signature:  ___________________________________________________             Relationship to Patient:           []  Parent    Responsible person

## 2025-07-09 ENCOUNTER — HOSPITAL ENCOUNTER (OUTPATIENT)
Facility: HOSPITAL | Age: 60
Discharge: HOME | End: 2025-07-09
Admitting: ORTHOPAEDIC SURGERY
Payer: OTHER GOVERNMENT

## 2025-07-09 DIAGNOSIS — R20.0 NUMBNESS: ICD-10-CM

## 2025-07-09 PROCEDURE — 95909 NRV CNDJ TST 5-6 STUDIES: CPT

## 2025-07-09 PROCEDURE — 95886 MUSC TEST DONE W/N TEST COMP: CPT

## 2025-07-14 NOTE — TELEPHONE ENCOUNTER
Patient is calling today, 07/14/25, to see what the status is on the retroactive referral from [facility] is.      Message forwarded to Chanel Padilla.       [facility] Rep

## 2025-07-14 NOTE — TELEPHONE ENCOUNTER
PLEASE!!! leave this message in patient calls because it reminds me to work on this. At this time, the patient's PCM is someone other than Michelle. Asked the patient to call Lizabeth and get the PCM updated to Michelle and back date it before 03/11/2025 so I can get this referral updated.

## 2025-07-16 NOTE — TELEPHONE ENCOUNTER
Spoke with patient. Advised that I spoke with Lizabeth and they cannot back date the referral. She understood. Also it would take 24-48 hours before there PCM is changed to MARILYN Antoine.

## 2025-07-16 NOTE — PROGRESS NOTES
Chief Complaint: Urge incontinence    Subjective         History of Present Illness  Dorina Basilio is a 59 y.o. female presents to [facility] to be seen for follow-up.    Patient was previously seen by me with last visit on 7/16/2024 for urge incontinence and OAB.  She had been out of Gemtesa at that visit and was doing well.  We discussed that if she developed new symptoms that we would start her back on Gemtesa.  She is here for follow-up.    History of Present Illness  The patient is a 59-year-old female here for follow-up of overactive bladder (OAB).    She reports an improvement in her urinary symptoms, with no instances of blood in her urine. She recalls an incident where she was able to engage in outdoor activities such as cutting grass and lifting objects without needing to urinate, which is a significant change from her usual pattern of needing to urinate every 35 minutes. Additionally, she mentions that during a recent shopping trip, she was able to completely empty her bladder, without having to go multiple times.    She is not currently on any medications for OAB.    Previous 7/16/2024:  Patient was previously seen by me with last visit on 12/22/2023 for urge incontinence and frequent urination.  At that visit she was continued on Gemtesa.  She is here for follow-up.     She reports Gemtesa was working but has been off of it for the past 2 months because she was out of town and ran out of refills.      She has gone back to drinking her water and gatorade along with lemon and energy boosters. She reports she is doing ok and not going as frequently as she was prior to starting medications.         Previous 12/22/2023:  Patient was previously seen by me with last visit on 11/10/2023 for urge incontinence and frequent urination.  At that visit her blood pressure was elevated so Myrbetriq was stopped and she was started on Gemtesa.  She is here for follow-up.     She sees some  "improvement, but also states she is inside drinking more fluids than normal.      Previous 11/10/2023:  Patient was seen by me with last visit on 8/7/2023 for recurrent urination and urge incontinence.  She was doing well on Myrbetriq 50 mg.  She is here for follow-up.     She has been off Myrbetriq since she ran out of samples. It was helping with her symptoms.  She is now back to having urinary frequency and urgency.  She does however report that the pelvic floor physical therapy has been helpful with increasing her muscle strength so that she is able to hold it when she does have urgency until she can get to the bathroom.     Previous 8/7/2023:  Patient was previously seen by me with last visit on 5/10/2023 for hematuria and urge incontinence.  She was put on oxybutynin which she took sporadically, however when she had her cystoscopy completed with Dr. Condon she advised him that it was just causing for too much dry mouth and she was unable to continue it so she was given samples of Myrbetriq.  She is here for follow-up. She reports she is doing well with Myrbetriq but needs to get this through her pharmacy. She needs samples today to get her through until she gets the medication from her pharmacy.  Her PCP did order the medication, so they will need to process the prior authorization.        Previous 5/10/2023:  Patient was previously seen by me with last appointment being on 4/4/2023.  At that visit she was started on oxybutynin.  We are also doing a work-up for hematuria.  She is here for follow-up PVR. She reports she is taking the medication sporadically. She takes it only when she is going to be out of the home for an extended period of time. She reports it does work well when she takes it.      Previous 4/4/2023:  Patient was seen by PCP on 3/7/2023 with a diagnosis of hematuria and was referred here for further evaluation.  She also reported at that time that she had \"vaginal mesh \"in 2016 but has been " having trouble with her bladder again.  She was seen by another primary care provider in that office on 3/31 and discussed that she was having difficulty with urinary urgency with incontinence.     Frequency- every 1/2 hour- states she does drink a lot of water   Urgency- admits   Incontinence- with urgency   Nocturia- 3-4   Stream- normal   GH- denies    surgeries- 2016 vaginal mesh- had [facility] prior   Family history of  malignancy- denies   Cardiopulmonary- denies   Anticoagulants- denies   Smoker- denies   History of kidney stones- last was in 2009, passed spontaneously   Drinks water, cranberry juice, coffee x 1-2 cups per day   UA with microscopy  2/21/2023 6-12 RBCs per high-powered field  3/1/2022 0-2 RBCs per high-powered field     Mom had ovarian CA           Procedures per Dr. Condon on 6/26/2023.        Urinalysis was checked today and was negative for signs of infection        Cytoscopy Procedure:      Procedure: Flexible cytoscope was passed per urethra into the bladder without difficulty after proper consent. The bladder was inspected in a systematic meridian fashion.         Normal bladder, some minor cystitis cystica.  No pathology        There were no tumors, lesions, stones, or other abnormalities noted within the bladder. Of note, there was no increased vascularity as well. Both ureteral orifices were identified and were normal in appearance. The flexible cytoscope was removed. The patient tolerated the procedure well.       exam - normal external vaginal exam, on speculum exam no signs of prolapse or mesh erosion.           6/23 CT pelvis with and without - negative.  6/23 CT abdomen/pelvis without - negative        4/23 NP -oxybutynin-taking sporadically, works but causes her mouth to be too dry to continue to take this.  Vaginal mesh 2016 2/23 0.8, GFR   77     PVR     5/20 000     Frequency- every 1/2 hour- states she does drink a lot of water   Urgency- admits   Incontinence- with  urgency   Nocturia- 3-4   Stream- normal   GH- denies    surgeries- 2016 vaginal mesh- had [facility] prior   Family history of  malignancy- denies   Cardiopulmonary- denies   Anticoagulants- denies   Smoker- denies   History of kidney stones- last was in 2009, passed spontaneously   Drinks water, cranberry juice, coffee x 1-2 cups per day   UA with microscopy  2/21/2023 6-12 RBCs per high-powered field  3/1/2022 0-2 RBCs per high-powered field           Urgency  Microhematuria     Cystoscopy negative, patient given reassurance     Stop oxybutynin causing severe dry's     Continue pelvic physical therapy     Myrbetriq 50 mg samples given today, risk and benefits discussed     Follow-up with NP           This document has been electronically signed by Kedar Condon MD  June 24, 2023 06:43 EDT         Objective     Past Medical History:   Diagnosis Date   • Abnormal Pap smear of cervix    • Acid reflux    • Allergic     seasonal   • Bladder disorder     [facility]/URGENCY   • Cholelithiasis     removed   • Chronic kidney disease    • CTS (carpal tunnel syndrome)    • Hypothyroidism    • Kidney stone    • Migraines    • Mixed hyperlipidemia 10/21/2024   • Pap smear for cervical cancer screening 01/2020    normal   • Primary hypertension 07/31/2023   • Seasonal allergies    • Tear of meniscus of knee    • Tennis elbow        Past Surgical History:   Procedure Laterality Date   • CARPAL TUNNEL RELEASE Right 2016   • CHOLECYSTECTOMY  2003   • COLONOSCOPY  2016    NORMAL   • CYST REMOVAL  1979    throat   • CYST REMOVAL Right 2013    ganglion   • DIAGNOSTIC LAPAROSCOPY     • ENDOMETRIAL ABLATION     • GALLBLADDER SURGERY  2003   • HAND SURGERY     • KNEE ARTHROSCOPY Right    • KNEE SURGERY     • LAPAROSCOPIC TUBAL LIGATION      FULGERATION   • OTHER SURGICAL HISTORY  1992    Chromoendoscopy of upper or lower gastrointestinal tract   • TONSILLECTOMY  1969   • TOOTH EXTRACTION  1978   • TRIGGER FINGER RELEASE Right 2016    4TH  FINGER   • TRIGGER FINGER RELEASE Right 04/28/2022    Procedure: RIGHT TRIGGER FINGER  RELEASE (long/middle finger);  Surgeon: Vladimir López MD;  Location: [facility] OR [facility] [place];  Service: Orthopedics;  Laterality: Right;   • TRIGGER POINT INJECTION     • TUBAL ABDOMINAL LIGATION     • UMBILICAL HERNIA REPAIR  2008   • VULVA SURGERY  1997         Current Outpatient Medications:   •  albuterol sulfate  (90 Base) MCG/ACT inhaler, Inhale 2 puffs Every 4 (Four) Hours As Needed for Wheezing or Shortness of Air., Disp: 18 g, Rfl: 1  •  azelastine (ASTELIN) 0.1 % nasal spray, Administer 2 sprays into the nostril(s) as directed by provider 2 (Two) Times a Day. Use in each nostril as directed, Disp: 30 mL, Rfl: 12  •  diclofenac (VOLTAREN) 75 MG EC tablet, TAKE 1 TABLET BY MOUTH TWICE DAILY, Disp: 60 tablet, Rfl: 1  •  fluticasone (FLONASE) 50 MCG/ACT nasal spray, 2 sprays into the nostril(s) as directed by provider Daily., Disp: 48 g, Rfl: 1  •  hydroCHLOROthiazide 12.5 MG tablet, TAKE 1 TABLET DAILY, Disp: 90 tablet, Rfl: 3  •  levocetirizine (XYZAL) 5 MG tablet, TAKE 1 TABLET EVERY EVENING, Disp: 90 tablet, Rfl: 3  •  levothyroxine (SYNTHROID, LEVOTHROID) 50 MCG tablet, TAKE 1 TABLET EVERY MORNING, Disp: 90 tablet, Rfl: 3  •  losartan (COZAAR) 50 MG tablet, TAKE 1 TABLET DAILY, Disp: 90 tablet, Rfl: 3  •  multivitamin with minerals tablet tablet, Take 1 tablet by mouth Daily., Disp: , Rfl:   •  omeprazole (priLOSEC) 40 MG capsule, Take 1 capsule by mouth Daily., Disp: 90 capsule, Rfl: 1  •  ubrogepant (Ubrelvy) 100 MG tablet, Take 1 tablet by mouth 1 (One) Time As Needed (migraine) for up to 1 dose., Disp: 8 tablet, Rfl: 5  •  vitamin D3 125 MCG (5000 UT) capsule capsule, Take 1 capsule by mouth Daily., Disp: , Rfl:   •  cycloSPORINE (Restasis) 0.05 % ophthalmic emulsion, Administer 1 drop to both eyes 2 (Two) Times a Day. (Patient not taking: Reported on 7/18/2025), Disp: 9 each, Rfl: 1    Allergies   Allergen  "Reactions   • Piroxicam Rash        Family History   Problem Relation Age of Onset   • Diabetes Mother    • Ovarian cancer Mother    • Osteoporosis Mother    • Arthritis Mother    • Cancer Mother    • Vision loss Mother    • Diabetes Brother    • Arthritis Brother    • Diabetes Sister    • Diabetes Maternal Grandmother    • Diabetes Maternal Grandfather    • Stomach cancer Other         uncle   • Diabetes Other         Aunt, uncle   • Arthritis Brother    • Diabetes Brother    • Diabetes Sister    • Stroke Sister    • Alcohol abuse Father    • Liver disease Father    • Malig Hyperthermia Neg Hx    • Breast cancer Neg Hx    • Uterine cancer Neg Hx    • Colon cancer Neg Hx    • Melanoma Neg Hx    • Prostate cancer Neg Hx        Social History     Socioeconomic History   • Marital status:    Tobacco Use   • Smoking status: Never     Passive exposure: Never   • Smokeless tobacco: Never   Vaping Use   • Vaping status: Never Used   Substance and Sexual Activity   • Alcohol use: Not Currently     Comment: have not had alcohol since the beginning of COVID   • Drug use: Never   • Sexual activity: Yes     Partners: Male     Birth control/protection: Tubal ligation, Vasectomy, Surgical       Vital Signs:   Resp 14   Ht 160 cm (63\")   Wt 81.6 kg (179 lb 14.3 oz)   BMI 31.87 kg/m²      Physical Exam  Vitals reviewed.   Constitutional:       Appearance: Normal appearance.   Neurological:      General: No focal deficit present.      Mental Status: She is alert and oriented to person, place, and time.   Psychiatric:         Mood and Affect: Mood normal.         Behavior: Behavior normal.        Result Review :   The following data was reviewed by: MARILYN Pinto on 07/18/2025:    Bladder Scan interpretation 07/18/2025    Estimation of residual urine via BVI 3000 Verathon Bladder Scan  MA/nurse performing: Nikky MCNEIL MA  Residual Urine: 0 ml  Indication: Urge incontinence    Microhematuria   Position: " Supine  Examination: Incremental scanning of the suprapubic area using 2.0 MHz transducer using copious amounts of acoustic gel.   Findings: An anechoic area was demonstrated which represented the bladder, with measurement of residual urine as noted. I inspected this myself. In that the residual urine was stable or insignificant, refer to plan for treatment and plan necessary at this time.            Results        Procedures        Assessment and Plan    Diagnoses and all orders for this visit:    1. Urge incontinence (Primary)  -     Bladder Scan    2. Microhematuria  -     Urinalysis With Microscopic - Urine, Clean Catch; Future        Assessment & Plan  1. Overactive Bladder (OAB).  Significant improvement in urinary symptoms, including reduced frequency and urgency, has been reported. Gemtesa has not been initiated, and symptoms are currently well-managed without medication. A urine sample will be collected during the upcoming lab visit to check for any presence of blood. If the urine sample shows no blood, the scheduled follow-up can be canceled. The goal is to ensure there is no hematuria, given the patient's history of blood in the urine.    Follow-up  Follow-up is scheduled for 1 year, contingent on the urine sample results.      Follow Up   Return in about 1 year (around 7/18/2026).  Patient was given instructions and counseling regarding her condition or for health maintenance advice. Please see specific information pulled into the AVS if appropriate.         This document has been electronically signed by MARILYN Pinto  July 18, 2025 08:22 EDT     Patient or patient representative verbalized consent for the use of Ambient Listening during the visit with  MARILYN Pinto for chart documentation. 7/18/2025  08:22 EDT

## 2025-07-18 ENCOUNTER — OFFICE VISIT (OUTPATIENT)
Dept: UROLOGY | Age: 60
End: 2025-07-18
Payer: OTHER GOVERNMENT

## 2025-07-18 VITALS — BODY MASS INDEX: 31.88 KG/M2 | RESPIRATION RATE: 14 BRPM | WEIGHT: 179.9 LBS | HEIGHT: 63 IN

## 2025-07-18 DIAGNOSIS — N39.41 URGE INCONTINENCE: Primary | ICD-10-CM

## 2025-07-18 DIAGNOSIS — R31.29 MICROHEMATURIA: ICD-10-CM

## 2025-07-18 LAB — URINE VOLUME: 0

## 2025-07-23 ENCOUNTER — PREP FOR SURGERY (OUTPATIENT)
Dept: OTHER | Facility: HOSPITAL | Age: 60
End: 2025-07-23
Payer: OTHER GOVERNMENT

## 2025-07-23 ENCOUNTER — OFFICE VISIT (OUTPATIENT)
Dept: ORTHOPEDIC SURGERY | Facility: CLINIC | Age: 60
End: 2025-07-23
Payer: OTHER GOVERNMENT

## 2025-07-23 VITALS
OXYGEN SATURATION: 93 % | BODY MASS INDEX: 31.71 KG/M2 | HEIGHT: 63 IN | SYSTOLIC BLOOD PRESSURE: 144 MMHG | DIASTOLIC BLOOD PRESSURE: 87 MMHG | WEIGHT: 179 LBS | HEART RATE: 70 BPM

## 2025-07-23 DIAGNOSIS — M65.352 TRIGGER LITTLE FINGER OF LEFT HAND: ICD-10-CM

## 2025-07-23 DIAGNOSIS — G56.02 CARPAL TUNNEL SYNDROME ON LEFT: Primary | ICD-10-CM

## 2025-07-23 DIAGNOSIS — M65.321 TRIGGER INDEX FINGER OF RIGHT HAND: ICD-10-CM

## 2025-07-23 DIAGNOSIS — M65.321 TRIGGER INDEX FINGER OF RIGHT HAND: Primary | ICD-10-CM

## 2025-07-23 DIAGNOSIS — M65.322 TRIGGER INDEX FINGER OF LEFT HAND: ICD-10-CM

## 2025-07-23 NOTE — PROGRESS NOTES
"Chief Complaint  Follow-up of the Left Wrist and Initial Evaluation of the Right Hand     Subjective      Dorina Basilio presents to [facility] for follow up of the left wrist initial evaluation of the right hand 2 nd digit.   She had a EMG and is here to review.  She has numbness and tingling.  She has difficulty with FMC and  strength.  She has triggering of the 2 nd and 5 th digits.  She is having trigging of the right 2 nd digit.   She wants to discuss surgical intervention.     Allergies   Allergen Reactions    Piroxicam Rash        Social History     Socioeconomic History    Marital status:    Tobacco Use    Smoking status: Never     Passive exposure: Never    Smokeless tobacco: Never   Vaping Use    Vaping status: Never Used   Substance and Sexual Activity    Alcohol use: Not Currently     Comment: have not had alcohol since the beginning of COVID    Drug use: Never    Sexual activity: Yes     Partners: Male     Birth control/protection: Tubal ligation, Vasectomy, Surgical        I reviewed the patient's chief complaint, history of present illness, review of systems, past medical history, surgical history, family history, social history, medications, and allergy list.     Review of Systems     Constitutional: Denies fevers, chills, weight loss  Cardiovascular: Denies chest pain, shortness of breath  Skin: Denies rashes, acute skin changes  Neurologic: Denies headache, loss of consciousness        Vital Signs:   /87   Pulse 70   Ht 160 cm (63\")   Wt 81.2 kg (179 lb)   SpO2 93%   BMI 31.71 kg/m²          Physical Exam  General: Alert. No acute distress    Ortho Exam        BILATERAL HANDS Positive left wrist Compression testing/ Positive left wrist Tinels. NegativeFinkelsteins. Negative Badillo's testing. Negative CMC grind testing. Positive Phalens left wrist  Full ROM of the hand, fingers, elbow and wrist. Positive Triggering of the digit of the right hand 2 " nd digit.  Triggering of the 2 nd and 5 th digits.  . Sensation grossly intact to light touch, median, radial and ulnar nerve. Positive AIN, PIN and ulnar nerve motor function intact. Axillary nerve intact. Positive pulses.        Procedures        Imaging Results (Most Recent)       None             Result Review :     IMPRESSION: 1. Evidence of a moderate, demyelinating, focal neuropathy of the left median nerve at the wrist (carpal tunnel). 2. No evidence of a left cervical radiculopathy, plexopathy, additional focal neuropathy, polyneuropathic changes, myopathy, or motor neuron pathology.    EMG & Nerve Conduction Test  Result Date: 7/9/2025  Narrative: See attached EMG/NCS report. Electronically signed by Edgar Mercer PT, 07/09/25, 10:29 AM EDT.              Assessment and Plan     Diagnoses and all orders for this visit:    1. Carpal tunnel syndrome on left (Primary)    2. Trigger index finger of right hand    3. Trigger index finger of left hand    4. Trigger little finger of left hand        Discussed the treatment plan with the patient. I reviewed the EMG with the patient.      Discussed the treatment options with the patient, operative vs non-operative. The patient expressed understanding and wished to proceed with a right 2 nd digit trigger finger release, left carpal tunnel release and 2 nd and 5 th trigger finger release of the left hand.         Discussed surgery., Risks/benefits discussed with patient including, but not limited to: infection, bleeding, neurovascular damage, re-rupture, aesthetic deformity, need for further surgery, and death., Surgery pamphlet given., and Call or return if worsening symptoms.    Follow Up     2 weeks postoperatively       Patient was given instructions and counseling regarding her condition or for health maintenance advice. Please see specific information pulled into the AVS if appropriate.     Scribed for Vladimir López MD by Coral Enciso MA.  07/23/25   11:02  EDT      I have personally performed the services described in this document as scribed by the above individual and it is both accurate and complete. Vladimir López MD 07/23/25

## 2025-07-30 ENCOUNTER — ANESTHESIA EVENT (OUTPATIENT)
Dept: PERIOP | Facility: HOSPITAL | Age: 60
End: 2025-07-30
Payer: OTHER GOVERNMENT

## 2025-07-31 ENCOUNTER — HOSPITAL ENCOUNTER (OUTPATIENT)
Facility: HOSPITAL | Age: 60
Discharge: HOME | End: 2025-07-31
Attending: ORTHOPAEDIC SURGERY | Admitting: ORTHOPAEDIC SURGERY
Payer: OTHER GOVERNMENT

## 2025-07-31 ENCOUNTER — ANESTHESIA (OUTPATIENT)
Dept: PERIOP | Facility: HOSPITAL | Age: 60
End: 2025-07-31
Payer: OTHER GOVERNMENT

## 2025-07-31 VITALS
RESPIRATION RATE: 16 BRPM | WEIGHT: 180.78 LBS | HEIGHT: 63 IN | BODY MASS INDEX: 32.03 KG/M2 | OXYGEN SATURATION: 92 % | TEMPERATURE: 97.1 F | HEART RATE: 60 BPM | DIASTOLIC BLOOD PRESSURE: 72 MMHG | SYSTOLIC BLOOD PRESSURE: 148 MMHG

## 2025-07-31 DIAGNOSIS — M65.321 TRIGGER INDEX FINGER OF RIGHT HAND: ICD-10-CM

## 2025-07-31 DIAGNOSIS — G56.02 CARPAL TUNNEL SYNDROME OF LEFT WRIST: Primary | ICD-10-CM

## 2025-07-31 PROCEDURE — 25010000002 CEFAZOLIN PER 500 MG: Performed by: ORTHOPAEDIC SURGERY

## 2025-07-31 PROCEDURE — 25010000002 LIDOCAINE PF 2% 2 % SOLUTION: Performed by: NURSE ANESTHETIST, CERTIFIED REGISTERED

## 2025-07-31 PROCEDURE — 64721 CARPAL TUNNEL SURGERY: CPT | Performed by: ORTHOPAEDIC SURGERY

## 2025-07-31 PROCEDURE — S0260 H&P FOR SURGERY: HCPCS | Performed by: ORTHOPAEDIC SURGERY

## 2025-07-31 PROCEDURE — 25010000002 DEXAMETHASONE PER 1 MG: Performed by: NURSE ANESTHETIST, CERTIFIED REGISTERED

## 2025-07-31 PROCEDURE — 26055 INCISE FINGER TENDON SHEATH: CPT | Performed by: ORTHOPAEDIC SURGERY

## 2025-07-31 PROCEDURE — 25010000002 FENTANYL CITRATE (PF) 50 MCG/ML SOLUTION: Performed by: NURSE ANESTHETIST, CERTIFIED REGISTERED

## 2025-07-31 PROCEDURE — 25810000003 LACTATED RINGERS PER 1000 ML: Performed by: ANESTHESIOLOGY

## 2025-07-31 PROCEDURE — 25010000002 MIDAZOLAM PER 1MG: Performed by: ANESTHESIOLOGY

## 2025-07-31 PROCEDURE — 25010000002 HYDROMORPHONE 1 MG/ML SOLUTION: Performed by: NURSE ANESTHETIST, CERTIFIED REGISTERED

## 2025-07-31 PROCEDURE — 25010000002 PROPOFOL 10 MG/ML EMULSION: Performed by: NURSE ANESTHETIST, CERTIFIED REGISTERED

## 2025-07-31 PROCEDURE — 25010000002 ONDANSETRON PER 1 MG: Performed by: NURSE ANESTHETIST, CERTIFIED REGISTERED

## 2025-07-31 RX ORDER — EPHEDRINE SULFATE 50 MG/ML
INJECTION INTRAVENOUS AS NEEDED
Status: DISCONTINUED | OUTPATIENT
Start: 2025-07-31 | End: 2025-07-31 | Stop reason: SURG

## 2025-07-31 RX ORDER — LIDOCAINE HYDROCHLORIDE 20 MG/ML
INJECTION, SOLUTION EPIDURAL; INFILTRATION; INTRACAUDAL; PERINEURAL AS NEEDED
Status: DISCONTINUED | OUTPATIENT
Start: 2025-07-31 | End: 2025-07-31 | Stop reason: SURG

## 2025-07-31 RX ORDER — ACETAMINOPHEN 500 MG
1000 TABLET ORAL ONCE
Status: COMPLETED | OUTPATIENT
Start: 2025-07-31 | End: 2025-07-31

## 2025-07-31 RX ORDER — PROMETHAZINE HYDROCHLORIDE 12.5 MG/1
25 TABLET ORAL ONCE AS NEEDED
Status: DISCONTINUED | OUTPATIENT
Start: 2025-07-31 | End: 2025-07-31 | Stop reason: HOSPADM

## 2025-07-31 RX ORDER — OXYCODONE HYDROCHLORIDE 5 MG/1
5 TABLET ORAL
Refills: 0 | Status: COMPLETED | OUTPATIENT
Start: 2025-07-31 | End: 2025-07-31

## 2025-07-31 RX ORDER — FENTANYL CITRATE 50 UG/ML
INJECTION, SOLUTION INTRAMUSCULAR; INTRAVENOUS AS NEEDED
Status: DISCONTINUED | OUTPATIENT
Start: 2025-07-31 | End: 2025-07-31 | Stop reason: SURG

## 2025-07-31 RX ORDER — ONDANSETRON 2 MG/ML
4 INJECTION INTRAMUSCULAR; INTRAVENOUS ONCE AS NEEDED
Status: DISCONTINUED | OUTPATIENT
Start: 2025-07-31 | End: 2025-07-31 | Stop reason: HOSPADM

## 2025-07-31 RX ORDER — PROPOFOL 10 MG/ML
VIAL (ML) INTRAVENOUS AS NEEDED
Status: DISCONTINUED | OUTPATIENT
Start: 2025-07-31 | End: 2025-07-31 | Stop reason: SURG

## 2025-07-31 RX ORDER — SODIUM CHLORIDE, SODIUM LACTATE, POTASSIUM CHLORIDE, CALCIUM CHLORIDE 600; 310; 30; 20 MG/100ML; MG/100ML; MG/100ML; MG/100ML
9 INJECTION, SOLUTION INTRAVENOUS CONTINUOUS PRN
Status: DISCONTINUED | OUTPATIENT
Start: 2025-07-31 | End: 2025-07-31 | Stop reason: HOSPADM

## 2025-07-31 RX ORDER — HYDROCODONE BITARTRATE AND ACETAMINOPHEN 7.5; 325 MG/1; MG/1
1 TABLET ORAL EVERY 6 HOURS PRN
Qty: 12 TABLET | Refills: 0 | Status: SHIPPED | OUTPATIENT
Start: 2025-07-31

## 2025-07-31 RX ORDER — DEXAMETHASONE SODIUM PHOSPHATE 4 MG/ML
INJECTION, SOLUTION INTRA-ARTICULAR; INTRALESIONAL; INTRAMUSCULAR; INTRAVENOUS; SOFT TISSUE AS NEEDED
Status: DISCONTINUED | OUTPATIENT
Start: 2025-07-31 | End: 2025-07-31 | Stop reason: SURG

## 2025-07-31 RX ORDER — PROMETHAZINE HYDROCHLORIDE 25 MG/1
25 SUPPOSITORY RECTAL ONCE AS NEEDED
Status: DISCONTINUED | OUTPATIENT
Start: 2025-07-31 | End: 2025-07-31 | Stop reason: HOSPADM

## 2025-07-31 RX ORDER — MIDAZOLAM HYDROCHLORIDE 2 MG/2ML
2 INJECTION, SOLUTION INTRAMUSCULAR; INTRAVENOUS ONCE
Status: COMPLETED | OUTPATIENT
Start: 2025-07-31 | End: 2025-07-31

## 2025-07-31 RX ADMIN — SODIUM CHLORIDE 2 G: 9 INJECTION, SOLUTION INTRAVENOUS at 07:53

## 2025-07-31 RX ADMIN — OXYCODONE HYDROCHLORIDE 5 MG: 5 TABLET ORAL at 09:43

## 2025-07-31 RX ADMIN — ONDANSETRON 4 MG: 2 INJECTION INTRAMUSCULAR; INTRAVENOUS at 08:00

## 2025-07-31 RX ADMIN — HYDROMORPHONE HYDROCHLORIDE 0.5 MG: 1 INJECTION, SOLUTION INTRAMUSCULAR; INTRAVENOUS; SUBCUTANEOUS at 09:12

## 2025-07-31 RX ADMIN — PROPOFOL 200 MG: 10 INJECTION, EMULSION INTRAVENOUS at 07:56

## 2025-07-31 RX ADMIN — FENTANYL CITRATE 50 MCG: 50 INJECTION, SOLUTION INTRAMUSCULAR; INTRAVENOUS at 08:05

## 2025-07-31 RX ADMIN — DEXAMETHASONE SODIUM PHOSPHATE 4 MG: 4 INJECTION, SOLUTION INTRAMUSCULAR; INTRAVENOUS at 08:00

## 2025-07-31 RX ADMIN — ACETAMINOPHEN 1000 MG: 500 TABLET ORAL at 07:32

## 2025-07-31 RX ADMIN — EPHEDRINE SULFATE 10 MG: 50 INJECTION INTRAVENOUS at 08:10

## 2025-07-31 RX ADMIN — LIDOCAINE HYDROCHLORIDE 100 MG: 20 INJECTION, SOLUTION INTRAVENOUS at 07:56

## 2025-07-31 RX ADMIN — SODIUM CHLORIDE, POTASSIUM CHLORIDE, SODIUM LACTATE AND CALCIUM CHLORIDE 9 ML/HR: 600; 310; 30; 20 INJECTION, SOLUTION INTRAVENOUS at 07:31

## 2025-07-31 RX ADMIN — HYDROMORPHONE HYDROCHLORIDE 0.5 MG: 1 INJECTION, SOLUTION INTRAMUSCULAR; INTRAVENOUS; SUBCUTANEOUS at 09:46

## 2025-07-31 RX ADMIN — OXYCODONE HYDROCHLORIDE 5 MG: 5 TABLET ORAL at 09:27

## 2025-07-31 RX ADMIN — FENTANYL CITRATE 50 MCG: 50 INJECTION, SOLUTION INTRAMUSCULAR; INTRAVENOUS at 07:54

## 2025-07-31 RX ADMIN — MIDAZOLAM HYDROCHLORIDE 2 MG: 1 INJECTION, SOLUTION INTRAMUSCULAR; INTRAVENOUS at 07:38

## 2025-07-31 NOTE — DISCHARGE INSTRUCTIONS
DISCHARGE INSTRUCTIONS  CARPAL TUNNEL RELEASE/TRIGGER FINGER       For your surgery you had:  General anesthesia (you may have a sore throat for the first 24 hours)  IV sedation  You may experience dizziness, drowsiness, or lightheadedness for several hours following surgery.  Do not stay alone today or tonight.  Limit your activity for 24 hours.  Resume your diet slowly.    You should not drive or operate machinery, drink alcohol, or sign legally binding documents for 24 hours or while you are taking pain medication.  Elevate affected arm on a pillow when resting.   Use ice to affected area for 48-72 hours. Apply 20 minutes on - 20 minutes off. Do NOT apply directly to skin.  Exercise fingers frequently by making a full fist and fully straightening the fingers. This will help prevent swelling and stiffness.  Do NOT do any heavy lifting, pulling or strenuous activities using the affected hand. [x] Keep splint on left hand clean, dry and intact until follow-up appointment.  [x] Remove gauze on right hand Saturday 8/2/25  [x] Do NOT submerge in water.  [x] Keep incision area clean and dry.  [x] You may shower Saturday, do not get bandage wet, cover with covering (garbage bag) NOTIFY YOUR DOCTOR IF YOU EXPERIENCE ANY OF THE FOLLOWING:  Temperature greater than 101 degrees Fahrenheit  Shaking Chills  Redness or excessive drainage from incision  Nausea, vomiting and/or pain that is not controlled by prescribed medications  Increase in bleeding or bleeding that is excessive  Unable to urinate in 6 hours after surgery  If unable to reach your doctor, please go to the closest Emergency Room  Last dose of pain medication was given at:   .  You should see Estela Kingsley for follow-up care   on Wednesday Aug 6, 2025 10:00 AM (Arrive by 9:45 AM)   Phone number:[phone]    SPECIAL INSTRUCTIONS:               I have read and received the above instructions.

## 2025-07-31 NOTE — ANESTHESIA PREPROCEDURE EVALUATION
Anesthesia Evaluation     Patient summary reviewed and Nursing notes reviewed   no history of anesthetic complications:   NPO Solid Status: > 8 hours  NPO Liquid Status: > 2 hours           Airway   Mallampati: II  TM distance: >3 FB  Neck ROM: full  No difficulty expected  Dental - normal exam     Pulmonary - negative pulmonary ROS and normal exam    breath sounds clear to auscultation  Cardiovascular - normal exam  Exercise tolerance: good (4-7 METS)    Rhythm: regular  Rate: normal    (+) hypertension, hyperlipidemia      Neuro/Psych  (+) headaches  GI/Hepatic/Renal/Endo    (+) obesity, GERD well controlled, renal disease- CRI, thyroid problem hypothyroidism    Musculoskeletal     (+) neck pain  Abdominal    Substance History - negative use     OB/GYN negative ob/gyn ROS         Other - negative ROS       ROS/Med Hx Other: PAT Nursing Notes unavailable.               Anesthesia Plan    ASA 2     general     (Patient understands anesthesia not responsible for dental damage.)  intravenous induction     Anesthetic plan, risks, benefits, and alternatives have been provided, discussed and informed consent has been obtained with: patient.    Use of blood products discussed with patient .    Plan discussed with CRNA.    CODE STATUS:

## 2025-07-31 NOTE — H&P
"Chief Complaint  Follow-up of the Left Wrist and Initial Evaluation of the Right Hand     Subjective  Dorina Basilio presents to [facility] for follow up of the left wrist initial evaluation of the right hand 2 nd digit.   She had a EMG and is here to review.  She has numbness and tingling.  She has difficulty with FMC and  strength.  She has triggering of the 2 nd and 5 th digits.  She is having trigging of the right 2 nd digit.   She wants to discuss surgical intervention.      Allergies        Allergies   Allergen Reactions    Piroxicam Rash            Social History   Social History            Socioeconomic History    Marital status:    Tobacco Use    Smoking status: Never       Passive exposure: Never    Smokeless tobacco: Never   Vaping Use    Vaping status: Never Used   Substance and Sexual Activity    Alcohol use: Not Currently       Comment: have not had alcohol since the beginning of COVID    Drug use: Never    Sexual activity: Yes       Partners: Male       Birth control/protection: Tubal ligation, Vasectomy, Surgical            I reviewed the patient's chief complaint, history of present illness, review of systems, past medical history, surgical history, family history, social history, medications, and allergy list.      Review of Systems      Constitutional: Denies fevers, chills, weight loss  Cardiovascular: Denies chest pain, shortness of breath  Skin: Denies rashes, acute skin changes  Neurologic: Denies headache, loss of consciousness           Vital Signs:   /87   Pulse 70   Ht 160 cm (63\")   Wt 81.2 kg (179 lb)   SpO2 93%   BMI 31.71 kg/m²           Physical Exam  General: Alert. No acute distress     Ortho Exam          BILATERAL HANDS Positive left wrist Compression testing/ Positive left wrist Tinels. NegativeFinkelsteins. Negative Badillo's testing. Negative CMC grind testing. Positive Phalens left wrist  Full ROM of the hand, fingers, elbow " and wrist. Positive Triggering of the digit of the right hand 2 nd digit.  Triggering of the 2 nd and 5 th digits.  . Sensation grossly intact to light touch, median, radial and ulnar nerve. Positive AIN, PIN and ulnar nerve motor function intact. Axillary nerve intact. Positive pulses.          Procedures           Imaging Results (Most Recent)         None                Result Review  :      IMPRESSION: 1. Evidence of a moderate, demyelinating, focal neuropathy of the left median nerve at the wrist (carpal tunnel). 2. No evidence of a left cervical radiculopathy, plexopathy, additional focal neuropathy, polyneuropathic changes, myopathy, or motor neuron pathology.     EMG & Nerve Conduction Test  Result Date: 7/9/2025  Narrative: See attached EMG/NCS report. Electronically signed by Edgar Mercer PT, 07/09/25, 10:29 AM EDT.            Assessment  Assessment and Plan      Diagnoses and all orders for this visit:     1. Carpal tunnel syndrome on left (Primary)     2. Trigger index finger of right hand     3. Trigger index finger of left hand     4. Trigger little finger of left hand           Discussed the treatment plan with the patient. I reviewed the EMG with the patient.       Discussed the treatment options with the patient, operative vs non-operative. The patient expressed understanding and wished to proceed with a right 2 nd digit trigger finger release, left carpal tunnel release and 2 nd and 5 th trigger finger release of the left hand.            Discussed surgery., Risks/benefits discussed with patient including, but not limited to: infection, bleeding, neurovascular damage, re-rupture, aesthetic deformity, need for further surgery, and death., Surgery pamphlet given., and Call or return if worsening symptoms.     Follow Up      2 weeks postoperatively

## 2025-07-31 NOTE — ANESTHESIA POSTPROCEDURE EVALUATION
Patient: Dorina Basilio    Procedure Summary       Date: 07/31/25 Room / Location: [facility]  / [facility]    Anesthesia Start: 0753 Anesthesia Stop: 0840    Procedures:       CARPAL TUNNEL RELEASE AND TRIGGER RELEASE second and fifth fingers (Left: Wrist)      TRIGGER RELEASE right second finger (Right: Fingers) Diagnosis:       Trigger index finger of right hand      (Trigger index finger of right hand [M65.321])    Surgeons: Vladimir López MD Provider: Jose Arcos MD    Anesthesia Type: general ASA Status: 2            Anesthesia Type: general    Vitals  Vitals Value Taken Time   /72 07/31/25 09:41   Temp 36.1 °C (97 °F) 07/31/25 08:40   Pulse 62 07/31/25 09:47   Resp 16 07/31/25 09:40   SpO2 90 % 07/31/25 09:47   Vitals shown include unfiled device data.        Post Anesthesia Care and Evaluation    Patient location during evaluation: bedside  Patient participation: complete - patient participated  Level of consciousness: awake  Pain management: adequate    Airway patency: patent  PONV Status: none  Cardiovascular status: acceptable and stable  Respiratory status: acceptable  Hydration status: acceptable

## 2025-07-31 NOTE — OP NOTE
CARPAL TUNNEL RELEASE, FINGER TRIGGER RELEASE  Procedure Report    Patient Name:  Dorina Basilio  YOB: 1965    Date of Surgery:  7/31/2025       Pre-op Diagnosis:   Trigger index finger of right hand [M65.321]     Carpal tunnel left 2nd and 5th trigger fingers  Post-Op Diagnosis Codes:     * Trigger index finger of right hand [M65.321]    Procedure:  Procedure(s):  Left CARPAL TUNNEL RELEASE AND TRIGGER RELEASE second and fifth fingers  TRIGGER RELEASE right second finger    Staff:  Surgeon(s):  Vladimir López MD    Assistant: Emi Rausch    Anesthesia: General    Estimated Blood Loss: 0 mL    Implants:    Nothing was implanted during the procedure    Specimen:          None      Complications: None    Description of Procedure:   LEFT CARPAL TUNNEL RELEASE:   The patient was informed of the risks and benefits.  The patient was taken to the operating room and placed supine after a anesthesia was established.  The left hand and upper extremity were prepped and draped satisfactorily using alcohol and ChloraPrep.      A standard incision was made just ulnar to the thenar crease to the thumb, approximately 1.5 cm length, carried down through subcutaneous tissue and fascia, carried down to the palmaris fascia with tenotomy scissors.  Using a 69 blade the transverse carpal ligament was released over the median nerve.  The median nerve had significant signs of compression.  A [place] elevator was used to protect the nerves during the release and after release had been completed, it was checked proximally and distally and under loupe magnification was satisfactory.  The nerve was inspected and was intact along with the motor branch.  The wound was copiously irrigated.  The skin was closed with horizontal mattress suture nylon and interrupted 3-0 horizontal mattress sutures.  2 incisions were made over the A1 pulley of the 2nd and 5th metacarpals dissection was carried down to each pulley they were  released and the tendons were freed pulled pulled free of the pulley as there is no more triggering those wounds were copiously irrigated and closed with nylon as well the sterile dressings were applied including a volar plaster splint.  The right hand was addressed it was prepped and draped in standard surgical fashion and then incision was made over the second metacarpal A1 pulley dissection was carried down to the pulley was released the tendons were pulled free of the pulley and there is no longer triggering the wound was copiously irrigated and closed with nylon sterile dressing was applied the patient tolerated the procedure well was taken recovery room.  Vladimir López MD     Date: 7/31/2025  Time: 08:34 EDT

## 2025-08-11 ENCOUNTER — OFFICE VISIT (OUTPATIENT)
Dept: ORTHOPEDIC SURGERY | Facility: CLINIC | Age: 60
End: 2025-08-11
Payer: OTHER GOVERNMENT

## 2025-08-11 VITALS
HEART RATE: 66 BPM | DIASTOLIC BLOOD PRESSURE: 75 MMHG | BODY MASS INDEX: 31.89 KG/M2 | HEIGHT: 63 IN | OXYGEN SATURATION: 96 % | WEIGHT: 180 LBS | SYSTOLIC BLOOD PRESSURE: 115 MMHG

## 2025-08-11 DIAGNOSIS — Z98.890 S/P CARPAL TUNNEL RELEASE: Primary | ICD-10-CM

## 2025-08-11 DIAGNOSIS — Z98.890 S/P TRIGGER FINGER RELEASE: ICD-10-CM

## 2025-08-11 PROCEDURE — 99024 POSTOP FOLLOW-UP VISIT: CPT

## 2025-08-14 ENCOUNTER — TELEPHONE (OUTPATIENT)
Dept: INTERNAL MEDICINE | Age: 60
End: 2025-08-14
Payer: OTHER GOVERNMENT

## 2025-08-19 ENCOUNTER — LAB (OUTPATIENT)
Dept: LAB | Facility: HOSPITAL | Age: 60
End: 2025-08-19
Payer: OTHER GOVERNMENT

## 2025-08-19 DIAGNOSIS — E78.2 MIXED HYPERLIPIDEMIA: ICD-10-CM

## 2025-08-19 DIAGNOSIS — Z00.00 ANNUAL PHYSICAL EXAM: ICD-10-CM

## 2025-08-19 DIAGNOSIS — I10 PRIMARY HYPERTENSION: ICD-10-CM

## 2025-08-19 DIAGNOSIS — E03.9 HYPOTHYROIDISM, UNSPECIFIED TYPE: ICD-10-CM

## 2025-08-19 LAB
FOLATE SERPL-MCNC: >20 NG/ML (ref 4.78–24.2)
VIT B12 BLD-MCNC: 1340 PG/ML (ref 211–946)

## 2025-08-19 PROCEDURE — 82306 VITAMIN D 25 HYDROXY: CPT

## 2025-08-19 PROCEDURE — 80061 LIPID PANEL: CPT

## 2025-08-19 PROCEDURE — 82746 ASSAY OF FOLIC ACID SERUM: CPT

## 2025-08-19 PROCEDURE — 82607 VITAMIN B-12: CPT

## 2025-08-19 PROCEDURE — 84443 ASSAY THYROID STIM HORMONE: CPT

## 2025-08-19 PROCEDURE — 84439 ASSAY OF FREE THYROXINE: CPT

## 2025-08-19 PROCEDURE — 80053 COMPREHEN METABOLIC PANEL: CPT

## 2025-08-19 PROCEDURE — 36415 COLL VENOUS BLD VENIPUNCTURE: CPT

## 2025-08-21 ENCOUNTER — OFFICE VISIT (OUTPATIENT)
Dept: INTERNAL MEDICINE | Age: 60
End: 2025-08-21
Payer: OTHER GOVERNMENT

## 2025-08-21 VITALS
BODY MASS INDEX: 31.75 KG/M2 | HEIGHT: 63 IN | RESPIRATION RATE: 16 BRPM | OXYGEN SATURATION: 97 % | TEMPERATURE: 98 F | SYSTOLIC BLOOD PRESSURE: 118 MMHG | WEIGHT: 179.2 LBS | DIASTOLIC BLOOD PRESSURE: 68 MMHG | HEART RATE: 65 BPM

## 2025-08-21 DIAGNOSIS — Z00.00 ANNUAL PHYSICAL EXAM: Primary | ICD-10-CM

## 2025-08-21 DIAGNOSIS — M65.331 TRIGGER MIDDLE FINGER OF RIGHT HAND: ICD-10-CM

## 2025-08-21 DIAGNOSIS — E03.9 HYPOTHYROIDISM, UNSPECIFIED TYPE: ICD-10-CM

## 2025-08-21 DIAGNOSIS — E55.9 VITAMIN D DEFICIENCY: ICD-10-CM

## 2025-08-21 DIAGNOSIS — G43.709 CHRONIC MIGRAINE WITHOUT AURA WITHOUT STATUS MIGRAINOSUS, NOT INTRACTABLE: ICD-10-CM

## 2025-08-21 DIAGNOSIS — G56.02 CARPAL TUNNEL SYNDROME OF LEFT WRIST: ICD-10-CM

## 2025-08-21 DIAGNOSIS — I10 PRIMARY HYPERTENSION: ICD-10-CM

## 2025-08-21 DIAGNOSIS — F43.9 STRESS AT HOME: ICD-10-CM

## 2025-08-21 DIAGNOSIS — J30.2 SEASONAL ALLERGIC RHINITIS, UNSPECIFIED TRIGGER: ICD-10-CM

## 2025-08-21 DIAGNOSIS — K21.9 GASTROESOPHAGEAL REFLUX DISEASE, UNSPECIFIED WHETHER ESOPHAGITIS PRESENT: ICD-10-CM

## 2025-08-21 LAB
25(OH)D3 SERPL-MCNC: 53.4 NG/ML (ref 30–100)
ALBUMIN SERPL-MCNC: 4.5 G/DL (ref 3.5–5.2)
ALBUMIN/GLOB SERPL: 1.7 G/DL
ALP SERPL-CCNC: 115 U/L (ref 39–117)
ALT SERPL W P-5'-P-CCNC: 20 U/L (ref 1–33)
ANION GAP SERPL CALCULATED.3IONS-SCNC: 18.8 MMOL/L (ref 5–15)
AST SERPL-CCNC: 18 U/L (ref 1–32)
BILIRUB SERPL-MCNC: 0.3 MG/DL (ref 0–1.2)
BUN SERPL-MCNC: 13 MG/DL (ref 8–23)
BUN/CREAT SERPL: 15.3 (ref 7–25)
CALCIUM SPEC-SCNC: 10.1 MG/DL (ref 8.6–10.5)
CHLORIDE SERPL-SCNC: 102 MMOL/L (ref 98–107)
CHOLEST SERPL-MCNC: 202 MG/DL (ref 0–200)
CO2 SERPL-SCNC: 21.2 MMOL/L (ref 22–29)
CREAT SERPL-MCNC: 0.85 MG/DL (ref 0.57–1)
EGFRCR SERPLBLD CKD-EPI 2021: 77.6 ML/MIN/1.73
GLOBULIN UR ELPH-MCNC: 2.6 GM/DL
GLUCOSE SERPL-MCNC: 113 MG/DL (ref 65–99)
HDLC SERPL-MCNC: 58 MG/DL (ref 40–60)
LDLC SERPL CALC-MCNC: 124 MG/DL (ref 0–100)
LDLC/HDLC SERPL: 2.1 {RATIO}
POTASSIUM SERPL-SCNC: 4.5 MMOL/L (ref 3.5–5.2)
PROT SERPL-MCNC: 7.1 G/DL (ref 6–8.5)
SODIUM SERPL-SCNC: 142 MMOL/L (ref 136–145)
T4 FREE SERPL-MCNC: 1.32 NG/DL (ref 0.92–1.68)
TRIGL SERPL-MCNC: 112 MG/DL (ref 0–150)
TSH SERPL DL<=0.05 MIU/L-ACNC: 1.17 UIU/ML (ref 0.27–4.2)
VLDLC SERPL-MCNC: 20 MG/DL (ref 5–40)

## 2025-08-21 RX ORDER — OMEPRAZOLE 40 MG/1
40 CAPSULE, DELAYED RELEASE ORAL DAILY
Qty: 90 CAPSULE | Refills: 1 | Status: SHIPPED | OUTPATIENT
Start: 2025-08-21

## 2025-08-22 ENCOUNTER — TELEPHONE (OUTPATIENT)
Dept: INTERNAL MEDICINE | Age: 60
End: 2025-08-22
Payer: OTHER GOVERNMENT

## 2025-08-22 ENCOUNTER — RESULTS FOLLOW-UP (OUTPATIENT)
Dept: INTERNAL MEDICINE | Age: 60
End: 2025-08-22
Payer: OTHER GOVERNMENT

## 2025-08-22 DIAGNOSIS — R73.01 IMPAIRED FASTING GLUCOSE: Primary | ICD-10-CM

## 2025-08-25 ENCOUNTER — TELEPHONE (OUTPATIENT)
Dept: INTERNAL MEDICINE | Age: 60
End: 2025-08-25
Payer: OTHER GOVERNMENT

## (undated) DEVICE — BNDG GZ SOF-FORM CONFRM 2X75IN LF STRL

## (undated) DEVICE — GLV SURG ULTRAFREE MAX LTX PF 8

## (undated) DEVICE — ENCORE® LATEX ORTHO SIZE 8, STERILE LATEX POWDER-FREE SURGICAL GLOVE: Brand: ENCORE

## (undated) DEVICE — STERILE POLYISOPRENE POWDER-FREE SURGICAL GLOVES: Brand: PROTEXIS

## (undated) DEVICE — UNDERCAST PADDING: Brand: DEROYAL

## (undated) DEVICE — EXTREMITY-LF: Brand: MEDLINE INDUSTRIES, INC.

## (undated) DEVICE — BLD OPTH BEAVER/MINIBLADE STR 180DEG DBL/BVL SS

## (undated) DEVICE — STERILE POLYISOPRENE POWDER-FREE SURGICAL GLOVES WITH EMOLLIENT COATING: Brand: PROTEXIS

## (undated) DEVICE — SUT ETHLN 4/0 FS2 18IN 662H

## (undated) DEVICE — DRSNG GZ PETROLTM XEROFORM CURAD 1X8IN STRL

## (undated) DEVICE — DRESSING,GAUZE,XEROFORM,CURAD,1"X8",ST: Brand: CURAD

## (undated) DEVICE — PENCL E/S SMOKEEVAC W/TELESCP CANN

## (undated) DEVICE — DISPOSABLE TOURNIQUET CUFF SINGLE BLADDER, SINGLE PORT AND QUICK CONNECT CONNECTOR: Brand: COLOR CUFF

## (undated) DEVICE — GLV SURG SENSICARE W/ALOE PF LF 7 STRL

## (undated) DEVICE — INTENDED FOR TISSUE SEPARATION, AND OTHER PROCEDURES THAT REQUIRE A SHARP SURGICAL BLADE TO PUNCTURE OR CUT.: Brand: BARD-PARKER ® CARBON RIB-BACK BLADES

## (undated) DEVICE — GAUZE,SPONGE,4"X4",16PLY,STRL,LF,10/TRAY: Brand: MEDLINE

## (undated) DEVICE — BNDG ESMARK 4IN 12FT LF STRL BLU

## (undated) DEVICE — BNDG ELAS ECON W/CLIP 4IN 5YD LF STRL

## (undated) DEVICE — GLOVE,SURG,SENSICARE SLT,LF,PF,7: Brand: MEDLINE

## (undated) DEVICE — NDL HYPO ECLPS SFTY 22G 1 1/2IN

## (undated) DEVICE — SYR LL TP 10ML STRL

## (undated) DEVICE — GLV SURG SENSICARE SLT PF LF 7 STRL

## (undated) DEVICE — HYPODERMIC SAFETY NEEDLE: Brand: MONOJECT

## (undated) DEVICE — BNDG COMPR S/ADHR 1IN 5YD TN NS

## (undated) DEVICE — APPL CHLORAPREP HI/LITE 26ML ORNG